# Patient Record
Sex: FEMALE | Race: WHITE | NOT HISPANIC OR LATINO | Employment: FULL TIME | URBAN - METROPOLITAN AREA
[De-identification: names, ages, dates, MRNs, and addresses within clinical notes are randomized per-mention and may not be internally consistent; named-entity substitution may affect disease eponyms.]

---

## 2017-01-20 ENCOUNTER — OFFICE VISIT (OUTPATIENT)
Dept: FAMILY MEDICINE | Facility: CLINIC | Age: 37
End: 2017-01-20
Payer: COMMERCIAL

## 2017-01-20 VITALS
HEIGHT: 64 IN | OXYGEN SATURATION: 97 % | HEART RATE: 109 BPM | RESPIRATION RATE: 16 BRPM | SYSTOLIC BLOOD PRESSURE: 100 MMHG | TEMPERATURE: 98.4 F | WEIGHT: 135 LBS | BODY MASS INDEX: 23.05 KG/M2 | DIASTOLIC BLOOD PRESSURE: 64 MMHG

## 2017-01-20 DIAGNOSIS — J01.90 ACUTE SINUSITIS WITH COEXISTING CONDITION, NEED PROPHYLACTIC TREATMENT: Primary | ICD-10-CM

## 2017-01-20 PROCEDURE — 99213 OFFICE O/P EST LOW 20 MIN: CPT | Performed by: INTERNAL MEDICINE

## 2017-01-20 RX ORDER — AZITHROMYCIN 250 MG/1
TABLET, FILM COATED ORAL
Qty: 6 TABLET | Refills: 0 | Status: SHIPPED | OUTPATIENT
Start: 2017-01-20 | End: 2017-01-25

## 2017-01-20 NOTE — MR AVS SNAPSHOT
After Visit Summary   1/20/2017    Allyn Mcqueen    MRN: [id number]           Patient Information     Date Of Birth          1980        Visit Information        Provider Department      1/20/2017 4:45 PM Trish Alvarado MD [facility]        Today's Diagnoses     Acute sinusitis with coexisting condition, need prophylactic treatment    -  1       Care Instructions    No antihistamine  Nasal saline  Menthol  Nasal steroid  Humidifier    If not improving over next days, fill A prescription for zpak    Call or return to clinic if symptoms worsen or fail to improve as anticipated.          Follow-ups after your visit        Who to contact     If you have questions or need follow up information about today's clinic visit or your schedule please contact [facility] directly at [phone].  Normal or non-critical lab and imaging results will be communicated to you by MyChart, letter or phone within 4 business days after the clinic has received the results. If you do not hear from us within 7 days, please contact the clinic through MyChart or phone. If you have a critical or abnormal lab result, we will notify you by phone as soon as possible.  Submit refill requests through [employer] or call your pharmacy and they will forward the refill request to us. Please allow 3 business days for your refill to be completed.          Additional Information About Your Visit        MyChart Information     [employer] gives you secure access to your electronic health record. If you see a primary care provider, you can also send messages to your care team and make appointments. If you have questions, please call your primary care clinic.  If you do not have a primary care provider, please call [phone] and they will assist you.        Care EveryWhere ID     This is your Care EveryWhere ID. This could be used by other organizations to access your [facility]  "records  [id number]        Your Vitals Were     Pulse Temperature Respirations Height BMI (Body Mass Index) Pulse Oximetry    109 98.4  F (36.9  C) (Oral) 16 5' 4\" (1.626 m) 23.16 kg/m2 97%       Blood Pressure from Last 3 Encounters:   01/20/17 100/64   10/11/16 108/72   03/09/16 100/62    Weight from Last 3 Encounters:   01/20/17 135 lb (61.236 kg)   10/11/16 140 lb (63.504 kg)   03/09/16 148 lb (67.132 kg)              Today, you had the following     No orders found for display         Today's Medication Changes          These changes are accurate as of: 1/20/17  5:13 PM.  If you have any questions, ask your nurse or doctor.               Start taking these medicines.        Dose/Directions    azithromycin 250 MG tablet   Commonly known as:  ZITHROMAX   Used for:  Acute sinusitis with coexisting condition, need prophylactic treatment   Started by:  Trish Alvarado MD        Two tablets first day, then one tablet daily for four days.   Quantity:  6 tablet   Refills:  0            Where to get your medicines      Some of these will need a paper prescription and others can be bought over the counter.  Ask your nurse if you have questions.     Bring a paper prescription for each of these medications    - azithromycin 250 MG tablet             Primary Care Provider Office Phone # Fax #    Noelle Lundy [phone]       [facility] [address]  [facility] [address]        Thank you!     Thank you for choosing [facility]  for your care. Our goal is always to provide you with excellent care. Hearing back from our patients is one way we can continue to improve our services. Please take a few minutes to complete the written survey that you may receive in the mail after your visit with us. Thank you!             Your Updated Medication List - Protect others around you: Learn how to safely use, store and throw away your medicines at " www.disposemymeds.org.          This list is accurate as of: 1/20/17  5:13 PM.  Always use your most recent med list.                   Brand Name Dispense Instructions for use    azithromycin 250 MG tablet    ZITHROMAX    6 tablet    Two tablets first day, then one tablet daily for four days.       docusate sodium 100 MG tablet    COLACE    60 tablet    Take 100 mg by mouth 2 times daily as needed for constipation       fluconazole 150 MG tablet    DIFLUCAN    4 tablet    Take 1 tablet (150 mg) by mouth every 3 days       fluticasone 50 MCG/ACT spray    FLONASE     Spray 2 sprays into both nostrils daily       ibuprofen 600 MG tablet    ADVIL/MOTRIN    30 tablet    Take 1 tablet (600 mg) by mouth every 6 hours as needed for moderate pain       metroNIDAZOLE 500 MG tablet    FLAGYL    14 tablet    Take 1 tablet (500 mg) by mouth 2 times daily       prenatal multivitamin  plus iron 27-0.8 MG Tabs per tablet      Take 1 tablet by mouth daily       SUDAFED PO

## 2017-01-20 NOTE — NURSING NOTE
"Chief Complaint   Patient presents with     Sinus Problem     sinus pressure, congestion coughing, chills last night.        Initial /64 mmHg  Pulse 109  Temp(Src) 98.4  F (36.9  C) (Oral)  Resp 16  Ht 5' 4\" (1.626 m)  Wt 135 lb (61.236 kg)  BMI 23.16 kg/m2  SpO2 97% Estimated body mass index is 23.16 kg/(m^2) as calculated from the following:    Height as of this encounter: 5' 4\" (1.626 m).    Weight as of this encounter: 135 lb (61.236 kg).  BP completed using cuff size: regular  "

## 2017-01-20 NOTE — PROGRESS NOTES
"SUBJECTIVE:   Allyn Mcqueen is a 36 year old female presenting with a chief complaint of   Chief Complaint   Patient presents with     Sinus Problem     sinus pressure, congestion coughing, chills last night.        Onset of symptoms was 8 day(s) ago.  Course of illness is worsening.    Severity moderate  Current and Associated symptoms: nasal congestion, rhinorrhea, facial pain/pressure, headache and wore with leaning forward  Treatment measures tried include Fluids and OTC meds.  Predisposing factors include None.    Past Medical History   Diagnosis Date     Ovarian cyst rupture 04/2010     Current Outpatient Prescriptions   Medication Sig Dispense Refill     metroNIDAZOLE (FLAGYL) 500 MG tablet Take 1 tablet (500 mg) by mouth 2 times daily 14 tablet 0     fluconazole (DIFLUCAN) 150 MG tablet Take 1 tablet (150 mg) by mouth every 3 days 4 tablet 1     fluticasone (FLONASE) 50 MCG/ACT nasal spray Spray 2 sprays into both nostrils daily       Pseudoephedrine HCl (SUDAFED PO)        ibuprofen (ADVIL,MOTRIN) 600 MG tablet Take 1 tablet (600 mg) by mouth every 6 hours as needed for moderate pain 30 tablet 1     docusate sodium (COLACE) 100 MG tablet Take 100 mg by mouth 2 times daily as needed for constipation 60 tablet 1     Prenatal Vit-Fe Fumarate-FA (PRENATAL MULTIVITAMIN  PLUS IRON) 27-0.8 MG TABS Take 1 tablet by mouth daily       Social History   Substance Use Topics     Smoking status: Never Smoker      Smokeless tobacco: Never Used     Alcohol Use: No       ROS:  CONSTITUTIONAL:no fever  RESP:NEGATIVE for SOB/dyspnea    OBJECTIVE  :/64 mmHg  Pulse 109  Temp(Src) 98.4  F (36.9  C) (Oral)  Resp 16  Ht 5' 4\" (1.626 m)  Wt 135 lb (61.236 kg)  BMI 23.16 kg/m2  SpO2 97%  GENERAL APPEARANCE: healthy, alert and no distress  HENT: ear canals and TM's normal.  Nose and mouth without ulcers, erythema or lesions  HENT: TM's normal bilaterally, oral mucous membranes moist, no erythema noted, frontal sinus " tenderness  and maxillary sinus tenderness   PND  NECK: supple, nontender, no lymphadenopathy  RESP: lungs clear to auscultation - no rales, rhonchi or wheezes  CV: regular rates and rhythm, normal S1 S2, no murmur noted    ASSESSMENT:  (J01.90) Acute sinusitis with coexisting condition, need prophylactic treatment  (primary encounter diagnosis)  Comment:   Plan: azithromycin (ZITHROMAX) 250 MG tablet             Patient Instructions   No antihistamine  Nasal saline  Menthol  Nasal steroid  Humidifier    If not improving over next days, fill A prescription for zpak    Call or return to clinic if symptoms worsen or fail to improve as anticipated.

## 2017-01-20 NOTE — PATIENT INSTRUCTIONS
No antihistamine  Nasal saline  Menthol  Nasal steroid  Humidifier    If not improving over next days, fill A prescription for zpak    Call or return to clinic if symptoms worsen or fail to improve as anticipated.

## 2017-01-22 ENCOUNTER — MYC MEDICAL ADVICE (OUTPATIENT)
Dept: FAMILY MEDICINE | Facility: CLINIC | Age: 37
End: 2017-01-22

## 2017-01-22 DIAGNOSIS — H10.9 CONJUNCTIVITIS, UNSPECIFIED CONJUNCTIVITIS TYPE, UNSPECIFIED LATERALITY: Primary | ICD-10-CM

## 2017-01-23 RX ORDER — POLYMYXIN B SULFATE AND TRIMETHOPRIM 1; 10000 MG/ML; [USP'U]/ML
1 SOLUTION OPHTHALMIC EVERY 4 HOURS
Qty: 1 BOTTLE | Refills: 0 | Status: SHIPPED | OUTPATIENT
Start: 2017-01-23 | End: 2017-01-30

## 2017-03-09 ENCOUNTER — OFFICE VISIT (OUTPATIENT)
Dept: FAMILY MEDICINE | Facility: CLINIC | Age: 37
End: 2017-03-09
Payer: COMMERCIAL

## 2017-03-09 VITALS
DIASTOLIC BLOOD PRESSURE: 69 MMHG | SYSTOLIC BLOOD PRESSURE: 106 MMHG | OXYGEN SATURATION: 100 % | BODY MASS INDEX: 24.24 KG/M2 | HEIGHT: 64 IN | TEMPERATURE: 97.6 F | WEIGHT: 142 LBS | HEART RATE: 60 BPM

## 2017-03-09 DIAGNOSIS — Z00.00 WELL WOMAN EXAM WITHOUT GYNECOLOGICAL EXAM: Primary | ICD-10-CM

## 2017-03-09 PROCEDURE — 99395 PREV VISIT EST AGE 18-39: CPT | Performed by: FAMILY MEDICINE

## 2017-03-09 ASSESSMENT — ANXIETY QUESTIONNAIRES
3. WORRYING TOO MUCH ABOUT DIFFERENT THINGS: SEVERAL DAYS
1. FEELING NERVOUS, ANXIOUS, OR ON EDGE: SEVERAL DAYS
2. NOT BEING ABLE TO STOP OR CONTROL WORRYING: NOT AT ALL
5. BEING SO RESTLESS THAT IT IS HARD TO SIT STILL: NOT AT ALL
IF YOU CHECKED OFF ANY PROBLEMS ON THIS QUESTIONNAIRE, HOW DIFFICULT HAVE THESE PROBLEMS MADE IT FOR YOU TO DO YOUR WORK, TAKE CARE OF THINGS AT HOME, OR GET ALONG WITH OTHER PEOPLE: SOMEWHAT DIFFICULT
7. FEELING AFRAID AS IF SOMETHING AWFUL MIGHT HAPPEN: SEVERAL DAYS
6. BECOMING EASILY ANNOYED OR IRRITABLE: SEVERAL DAYS
GAD7 TOTAL SCORE: 5

## 2017-03-09 ASSESSMENT — PATIENT HEALTH QUESTIONNAIRE - PHQ9: 5. POOR APPETITE OR OVEREATING: SEVERAL DAYS

## 2017-03-09 NOTE — MR AVS SNAPSHOT
After Visit Summary   3/9/2017    Allyn Mcqueen    MRN: [id number]           Patient Information     Date Of Birth          1980        Visit Information        Provider Department      3/9/2017 9:20 AM Noelle Lundy MD [facility]        Care Instructions    Start taking folic acid and omega-3 fatty acids several months before trying to get pregnant.      Preventive Health Recommendations  Female Ages 26 - 39  Yearly exam:   See your health care provider every year in order to    Review health changes.     Discuss preventive care.      Review your medicines if you your doctor has prescribed any.    Until age 30: Get a Pap test every three years (more often if you have had an abnormal result).    After age 30: Talk to your doctor about whether you should have a Pap test every 3 years or have a Pap test with HPV screening every 5 years.   You do not need a Pap test if your uterus was removed (hysterectomy) and you have not had cancer.  You should be tested each year for STDs (sexually transmitted diseases), if you're at risk.   Talk to your provider about how often to have your cholesterol checked.  If you are at risk for diabetes, you should have a diabetes test (fasting glucose).  Shots: Get a flu shot each year. Get a tetanus shot every 10 years.   Nutrition:     Eat at least 5 servings of fruits and vegetables each day.    Eat whole-grain bread, whole-wheat pasta and brown rice instead of white grains and rice.    Talk to your provider about Calcium and Vitamin D.     Lifestyle    Exercise at least 150 minutes a week (30 minutes a day, 5 days of the week). This will help you control your weight and prevent disease.    Limit alcohol to one drink per day.    No smoking.     Wear sunscreen to prevent skin cancer.    See your dentist every six months for an exam and cleaning.          Follow-ups after your visit        Your next 10 appointments already scheduled     Mar  "17, 2017 11:00 AM CDT   Office Visit with Mireille Figueroa MD   [facility] ([facility]    [address] [place] [id number] [phone]           Bring a current list of meds and any records pertaining to this visit.  For Physicals, please bring immunization records and any forms needing to be filled out.  Please arrive 10 minutes early to complete paperwork.              Who to contact     If you have questions or need follow up information about today's clinic visit or your schedule please contact [facility] directly at [phone].  Normal or non-critical lab and imaging results will be communicated to you by MyChart, letter or phone within 4 business days after the clinic has received the results. If you do not hear from us within 7 days, please contact the clinic through Anywhere to Got or phone. If you have a critical or abnormal lab result, we will notify you by phone as soon as possible.  Submit refill requests through [employer] or call your pharmacy and they will forward the refill request to us. Please allow 3 business days for your refill to be completed.          Additional Information About Your Visit        Cuedd Information     [employer] gives you secure access to your electronic health record. If you see a primary care provider, you can also send messages to your care team and make appointments. If you have questions, please call your primary care clinic.  If you do not have a primary care provider, please call [phone] and they will assist you.        Care EveryWhere ID     This is your Care EveryWhere ID. This could be used by other organizations to access your [facility] medical records  [id number]        Your Vitals Were     Pulse Temperature Height Pulse Oximetry BMI (Body Mass Index)       60 97.6  F (36.4  C) (Oral) 5' 4\" (1.626 m) 100% 24.37 kg/m2        Blood Pressure from Last 3 Encounters:   03/09/17 106/69   01/20/17 " 100/64   10/11/16 108/72    Weight from Last 3 Encounters:   03/09/17 142 lb (64.4 kg)   01/20/17 135 lb (61.2 kg)   10/11/16 140 lb (63.5 kg)              Today, you had the following     No orders found for display         Today's Medication Changes          These changes are accurate as of: 3/9/17 10:04 AM.  If you have any questions, ask your nurse or doctor.               These medicines have changed or have updated prescriptions.        Dose/Directions    IBUPROFEN PO   This may have changed:  Another medication with the same name was removed. Continue taking this medication, and follow the directions you see here.   Changed by:  Noelle Lundy MD        Dose:  200 mg   Take 200 mg by mouth   Refills:  0                Primary Care Provider Office Phone # Fax #    Noelle Lundy [phone]       [place] [address] [address] [facility] [address]        Thank you!     Thank you for choosing [facility]  for your care. Our goal is always to provide you with excellent care. Hearing back from our patients is one way we can continue to improve our services. Please take a few minutes to complete the written survey that you may receive in the mail after your visit with us. Thank you!             Your Updated Medication List - Protect others around you: Learn how to safely use, store and throw away your medicines at www.disposemymeds.org.          This list is accurate as of: 3/9/17 10:04 AM.  Always use your most recent med list.                   Brand Name Dispense Instructions for use    azithromycin 250 MG tablet    ZITHROMAX    6 tablet    Two tablets first day, then one tablet daily for four days.       docusate sodium 100 MG tablet    COLACE    60 tablet    Take 100 mg by mouth 2 times daily as needed for constipation       fluconazole 150 MG tablet    DIFLUCAN    4 tablet    Take 1 tablet (150 mg) by mouth every 3 days       fluticasone 50 MCG/ACT  spray    FLONASE     Spray 2 sprays into both nostrils daily       IBUPROFEN PO      Take 200 mg by mouth       metroNIDAZOLE 500 MG tablet    FLAGYL    14 tablet    Take 1 tablet (500 mg) by mouth 2 times daily       MULTIVITAMIN & MINERAL PO          prenatal multivitamin  plus iron 27-0.8 MG Tabs per tablet      Take 1 tablet by mouth daily Reported on 3/9/2017       SUDAFED PO      Reported on 3/9/2017

## 2017-03-09 NOTE — PROGRESS NOTES
"   SUBJECTIVE:     CC: Allyn Mcqueen is an 36 year old woman who presents for preventive health visit.     Since her last visit, she has not had any major concerns or changes.     Chest Pain  She does say that for the past month, she has experienced a few episodes of chest tightness and that radiates down both andrew. This has happened when waking up at night and she says is mostly stress related. 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 She has had anxiety attacks about 10 years ago, but was not medicated for them and doesn't remember if there recent episodes are the same type of pain. She did not take any medications at that time for anxiety, and is not interested in starting one right now as she thinks that she mostly needs lifestyle/stress modification.     Family Planning  She is planning on having another child in the near future and currently has an IUD. Her previous pregnancy had no complications and she will be scheduling an appointment to have the IUD out in the upcoming months after starting folic acid.     Healthy Habits:  Answers for HPI/ROS submitted by the patient on 3/6/2017   Annual Exam:  Getting at least 3 servings of Calcium per day:: Yes  Bi-annual eye exam:: Yes  Dental care twice a year:: Yes  Sleep apnea or symptoms of sleep apnea:: None  Diet:: Regular (no restrictions)  Frequency of exercise:: 2-3 days/week  Taking medications regularly:: Not Applicable  Medication side effects:: Not applicable  Additional concerns today:: YES  Q1: Little interest or pleasure in doing things: 0=Not at all  Q2: Feeling down, depressed or hopeless: 1=Several days  PHQ-2 Score: 1  Duration of exercise:: 30-45 minutes    Anxiety ; chest pain here and there ; possibly a combo of muscle pain and anxiety     Today's PHQ-2 Score:   PHQ-2 ( 1999 [employer]) 3/6/2017 1/20/2017   Q1: Little interest or pleasure in doing things - 0   Q2: " Feeling down, depressed or hopeless - 0   PHQ-2 Score - 0   Little interest or pleasure in doing things Not at all -   Feeling down, depressed or hopeless Several days -   PHQ-2 Score 1 -      PHQ-2 entered via MyMusic   Abuse: Current or Past(Physical, Sexual or Emotional)- No  Do you feel safe in your environment - Yes    Social History   Substance Use Topics     Smoking status: Never Smoker     Smokeless tobacco: Never Used     Alcohol use No     The patient does not drink >3 drinks per day nor >7 drinks per week.    Recent Labs   Lab Test  04/19/10   0925   CHOL  188   HDL  59   LDL  110   TRIG  96   CHOLHDLRATIO  3.2     Reviewed orders with patient.  Reviewed health maintenance and updated orders accordingly - Yes    ROS:  C: NEGATIVE for fever, chills, change in weight  I: NEGATIVE for worrisome rashes, moles or lesions  E: NEGATIVE for vision changes or irritation  ENT: NEGATIVE for ear, mouth and throat problems  R: NEGATIVE for significant cough or SOB  B: NEGATIVE for masses, tenderness or discharge  CV: NEGATIVE for chest pain, palpitations or peripheral edema  GI: NEGATIVE for nausea, abdominal pain, heartburn, or change in bowel habits  : NEGATIVE for unusual urinary or vaginal symptoms. Periods are regular.  M: NEGATIVE for significant arthralgias or myalgia  N: NEGATIVE for weakness, dizziness or paresthesias  P: NEGATIVE for changes in mood or affect    BP Readings from Last 3 Encounters:   03/09/17 106/69   01/20/17 100/64   10/11/16 108/72    Wt Readings from Last 3 Encounters:   03/09/17 142 lb (64.4 kg)   01/20/17 135 lb (61.2 kg)   10/11/16 140 lb (63.5 kg)                  Current Outpatient Prescriptions   Medication Sig Dispense Refill     IBUPROFEN PO Take 200 mg by mouth       Multiple Vitamins-Minerals (MULTIVITAMIN & MINERAL PO)        fluticasone (FLONASE) 50 MCG/ACT nasal spray Spray 2 sprays into both nostrils daily       Pseudoephedrine HCl (SUDAFED PO) Reported on 3/9/2017        "Prenatal Vit-Fe Fumarate-FA (PRENATAL MULTIVITAMIN  PLUS IRON) 27-0.8 MG TABS Take 1 tablet by mouth daily Reported on 3/9/2017       Allergies   Allergen Reactions     Penicillins Rash     Rash on Augmentin     Recent Labs   Lab Test  05/30/13   1100  04/19/10   0925   LDL   --   110   HDL   --   59   TRIG   --   96   TSH  1.52   --       OBJECTIVE:     /69  Pulse 60  Temp 97.6  F (36.4  C) (Oral)  Ht 5' 4\" (1.626 m)  Wt 142 lb (64.4 kg)  SpO2 100%  BMI 24.37 kg/m2    EXAM:  Physical Exam   Constitutional: She is oriented to person, place, and time and well-developed, well-nourished, and in no distress. No distress.   HENT:   Nose: Nose normal.   Mouth/Throat: Oropharynx is clear and moist.   Eyes: Conjunctivae and EOM are normal. Pupils are equal, round, and reactive to light. No scleral icterus.   Neck: Normal range of motion. Neck supple.   Cardiovascular: Normal rate, regular rhythm and normal heart sounds.  Exam reveals no gallop and no friction rub.    No murmur heard.  Pulmonary/Chest: Effort normal and breath sounds normal. No respiratory distress. She has no wheezes. She has no rales.   Abdominal: Soft. Bowel sounds are normal. She exhibits no distension and no mass. There is no tenderness.   Musculoskeletal: Normal range of motion. She exhibits no deformity.   Neurological: She is alert and oriented to person, place, and time. No cranial nerve deficit. She exhibits normal muscle tone. Gait normal. GCS score is 15.   Skin: Skin is warm. No rash noted.   Psychiatric: Mood, memory, affect and judgment normal.   Physical Exam   Constitutional: She is oriented to person, place, and time and well-developed, well-nourished, and in no distress. No distress.   HENT:   Nose: Nose normal.   Mouth/Throat: Oropharynx is clear and moist.   Eyes: Conjunctivae and EOM are normal. Pupils are equal, round, and reactive to light. No scleral icterus.   Neck: Normal range of motion. Neck supple.   Cardiovascular: Normal " "rate, regular rhythm and normal heart sounds.  Exam reveals no gallop and no friction rub.    No murmur heard.  Pulmonary/Chest: Effort normal and breath sounds normal. No respiratory distress. She has no wheezes. She has no rales.   Abdominal: Soft. Bowel sounds are normal. She exhibits no distension and no mass. There is no tenderness.   Musculoskeletal: Normal range of motion. She exhibits no deformity.   Neurological: She is alert and oriented to person, place, and time. No cranial nerve deficit. She exhibits normal muscle tone. Gait normal. GCS score is 15.   Skin: Skin is warm. No rash noted.   Psychiatric: Mood, memory, affect and judgment normal.        ASSESSMENT/PLAN:     Well woman exam, routine.    1. Contraception  Currently has an IUD, will follow up here or with OB for removal when she is ready.   - No changes today    2. Chest Tightness  Possible intermittent PAC's  Stress reduction  Likely stress related, given the character and timing. She is not interested is starting an anxiolytic at this time and will work on stress reduction at home and work.   - Follow up if worse or changing in character      COUNSELING:   Reviewed preventive health counseling, as reflected in patient instructions         reports that she has never smoked. She has never used smokeless tobacco.  Estimated body mass index is 24.37 kg/(m^2) as calculated from the following:    Height as of this encounter: 5' 4\" (1.626 m).    Weight as of this encounter: 142 lb (64.4 kg).      Counseling Resources:  ATP IV Guidelines  Pooled Cohorts Equation Calculator  Breast Cancer Risk Calculator  FRAX Risk Assessment  ICSI Preventive Guidelines  Dietary Guidelines for Americans, 2010  USDA's MyPlate  ASA Prophylaxis  Lung CA Screening    Noelle Lundy MD  [facility]    "

## 2017-03-09 NOTE — NURSING NOTE
"Chief Complaint   Patient presents with     Physical     Initial Ht 5' 4\" (1.626 m)  Wt 142 lb (64.4 kg)  BMI 24.37 kg/m2 Estimated body mass index is 24.37 kg/(m^2) as calculated from the following:    Height as of this encounter: 5' 4\" (1.626 m).    Weight as of this encounter: 142 lb (64.4 kg)..  BP completed using cuff size: yokasta Ochoa MA   "

## 2017-03-09 NOTE — PATIENT INSTRUCTIONS
Start taking folic acid and omega-3 fatty acids several months before trying to get pregnant.      Preventive Health Recommendations  Female Ages 26 - 39  Yearly exam:   See your health care provider every year in order to    Review health changes.     Discuss preventive care.      Review your medicines if you your doctor has prescribed any.    Until age 30: Get a Pap test every three years (more often if you have had an abnormal result).    After age 30: Talk to your doctor about whether you should have a Pap test every 3 years or have a Pap test with HPV screening every 5 years.   You do not need a Pap test if your uterus was removed (hysterectomy) and you have not had cancer.  You should be tested each year for STDs (sexually transmitted diseases), if you're at risk.   Talk to your provider about how often to have your cholesterol checked.  If you are at risk for diabetes, you should have a diabetes test (fasting glucose).  Shots: Get a flu shot each year. Get a tetanus shot every 10 years.   Nutrition:     Eat at least 5 servings of fruits and vegetables each day.    Eat whole-grain bread, whole-wheat pasta and brown rice instead of white grains and rice.    Talk to your provider about Calcium and Vitamin D.     Lifestyle    Exercise at least 150 minutes a week (30 minutes a day, 5 days of the week). This will help you control your weight and prevent disease.    Limit alcohol to one drink per day.    No smoking.     Wear sunscreen to prevent skin cancer.    See your dentist every six months for an exam and cleaning.

## 2017-03-10 ASSESSMENT — ANXIETY QUESTIONNAIRES: GAD7 TOTAL SCORE: 5

## 2017-04-05 ENCOUNTER — PRENATAL OFFICE VISIT (OUTPATIENT)
Dept: OBGYN | Facility: CLINIC | Age: 37
End: 2017-04-05
Payer: COMMERCIAL

## 2017-04-05 VITALS
SYSTOLIC BLOOD PRESSURE: 118 MMHG | BODY MASS INDEX: 24.2 KG/M2 | DIASTOLIC BLOOD PRESSURE: 81 MMHG | HEART RATE: 67 BPM | OXYGEN SATURATION: 100 % | WEIGHT: 141 LBS

## 2017-04-05 DIAGNOSIS — Z30.432 ENCOUNTER FOR IUD REMOVAL: Primary | ICD-10-CM

## 2017-04-05 LAB — BETA HCG QUAL IFA URINE: NEGATIVE

## 2017-04-05 PROCEDURE — 84703 CHORIONIC GONADOTROPIN ASSAY: CPT | Performed by: OBSTETRICS & GYNECOLOGY

## 2017-04-05 PROCEDURE — 58301 REMOVE INTRAUTERINE DEVICE: CPT | Performed by: OBSTETRICS & GYNECOLOGY

## 2017-04-05 NOTE — NURSING NOTE
"Chief Complaint   Patient presents with     IUD       Initial /81  Pulse 67  Wt 141 lb (64 kg)  SpO2 100%  Breastfeeding? No  BMI 24.2 kg/m2 Estimated body mass index is 24.2 kg/(m^2) as calculated from the following:    Height as of 3/9/17: 5' 4\" (1.626 m).    Weight as of this encounter: 141 lb (64 kg).  BP completed using cuff size: regular        The following HM Due: NONE      The following patient reported/Care Every where data was sent to:  P ABSTRACT QUALITY INITIATIVES [47510]  none     n/a             "

## 2017-04-05 NOTE — PROGRESS NOTES
Allyn Mcqueen is a 36 year old  who presents requesting removal of IUD.  She plans pregnancy.      OBJECTIVE:  Healthy female in NAD.  /81  Pulse 67  Wt 141 lb (64 kg)  SpO2 100%  Breastfeeding? No  BMI 24.2 kg/m2     Speculum is placed in the vagina, hibiclens prep used, and the IUD string is grasped with a ring forceps, and removed without difficulty.      She tolerated this well.     ASSESSMENT:  IUD removal.    PLAN: RTC as needed.

## 2017-04-05 NOTE — MR AVS SNAPSHOT
After Visit Summary   4/5/2017    Allyn Mcqueen    MRN: [id number]           Patient Information     Date Of Birth          1980        Visit Information        Provider Department      4/5/2017 3:00 PM Sally Henderson MD [facility]        Today's Diagnoses     Encounter for IUD removal    -  1       Follow-ups after your visit        Who to contact     If you have questions or need follow up information about today's clinic visit or your schedule please contact [facility] directly at [phone].  Normal or non-critical lab and imaging results will be communicated to you by AOI Medicalhart, letter or phone within 4 business days after the clinic has received the results. If you do not hear from us within 7 days, please contact the clinic through Retargetlyt or phone. If you have a critical or abnormal lab result, we will notify you by phone as soon as possible.  Submit refill requests through Qu[employer] Roller or call your pharmacy and they will forward the refill request to us. Please allow 3 business days for your refill to be completed.          Additional Information About Your Visit        MyChart Information     [employer]ler gives you secure access to your electronic health record. If you see a primary care provider, you can also send messages to your care team and make appointments. If you have questions, please call your primary care clinic.  If you do not have a primary care provider, please call [phone] and they will assist you.        Care EveryWhere ID     This is your Care EveryWhere ID. This could be used by other organizations to access your [place] medical records  [id number]        Your Vitals Were     Pulse Pulse Oximetry Breastfeeding? BMI (Body Mass Index)          67 100% No 24.2 kg/m2         Blood Pressure from Last 3 Encounters:   04/05/17 118/81   03/09/17 106/69   01/20/17 100/64    Weight from Last 3 Encounters:   04/05/17 141 lb (64 kg)   03/09/17 142 lb  (64.4 kg)   01/20/17 135 lb (61.2 kg)              We Performed the Following     Beta HCG qual IFA urine     REMOVE INTRAUTERINE DEVICE        Primary Care Provider Office Phone # Fax #    Noelle Lundy [phone]       [facility] [address]  [facility] [address]        Thank you!     Thank you for choosing [facility]  for your care. Our goal is always to provide you with excellent care. Hearing back from our patients is one way we can continue to improve our services. Please take a few minutes to complete the written survey that you may receive in the mail after your visit with us. Thank you!             Your Updated Medication List - Protect others around you: Learn how to safely use, store and throw away your medicines at www.disposemymeds.org.          This list is accurate as of: 4/5/17  3:29 PM.  Always use your most recent med list.                   Brand Name Dispense Instructions for use    fluticasone 50 MCG/ACT spray    FLONASE     Spray 2 sprays into both nostrils daily       IBUPROFEN PO      Take 200 mg by mouth       MULTIVITAMIN & MINERAL PO          prenatal multivitamin  plus iron 27-0.8 MG Tabs per tablet      Take 1 tablet by mouth daily Reported on 3/9/2017       SUDAFED PO      Reported on 3/9/2017

## 2017-04-17 ENCOUNTER — OFFICE VISIT (OUTPATIENT)
Dept: FAMILY MEDICINE | Facility: CLINIC | Age: 37
End: 2017-04-17
Payer: COMMERCIAL

## 2017-04-17 DIAGNOSIS — K13.0 ANGULAR CHEILITIS: Primary | ICD-10-CM

## 2017-04-17 PROCEDURE — 99213 OFFICE O/P EST LOW 20 MIN: CPT | Performed by: NURSE PRACTITIONER

## 2017-04-17 RX ORDER — CLOTRIMAZOLE 1 %
CREAM (GRAM) TOPICAL 2 TIMES DAILY
Qty: 15 G | Refills: 1 | COMMUNITY
Start: 2017-04-17 | End: 2017-07-26

## 2017-04-17 RX ORDER — MUPIROCIN 20 MG/G
OINTMENT TOPICAL 3 TIMES DAILY
Qty: 15 G | Refills: 1 | Status: SHIPPED | OUTPATIENT
Start: 2017-04-17 | End: 2017-05-01

## 2017-04-17 NOTE — MR AVS SNAPSHOT
After Visit Summary   4/17/2017    Allyn Mcqueen    MRN: [id number]           Patient Information     Date Of Birth          1980        Visit Information        Provider Department      4/17/2017 3:40 PM Noelle Pereira APRN CNP [facility]        Today's Diagnoses     Angular cheilitis    -  1       Follow-ups after your visit        Who to contact     If you have questions or need follow up information about today's clinic visit or your schedule please contact [facility] directly at [phone].  Normal or non-critical lab and imaging results will be communicated to you by [employer] Continuityhart, letter or phone within 4 business days after the clinic has received the results. If you do not hear from us within 7 days, please contact the clinic through [employer] Continuityhart or phone. If you have a critical or abnormal lab result, we will notify you by phone as soon as possible.  Submit refill requests through [email or web address] or call your pharmacy and they will forward the refill request to us. Please allow 3 business days for your refill to be completed.          Additional Information About Your Visit        MyChart Information     [email or web address] gives you secure access to your electronic health record. If you see a primary care provider, you can also send messages to your care team and make appointments. If you have questions, please call your primary care clinic.  If you do not have a primary care provider, please call [phone] and they will assist you.        Care EveryWhere ID     This is your Care EveryWhere ID. This could be used by other organizations to access your [place] medical records  [id number]        Your Vitals Were     Pulse Temperature Respirations Pulse Oximetry BMI (Body Mass Index)       64 98  F (36.7  C) 16 98% 24.2 kg/m2        Blood Pressure from Last 3 Encounters:   04/17/17 106/72   04/05/17 118/81   03/09/17 106/69    Weight from Last 3 Encounters:    04/17/17 141 lb (64 kg)   04/05/17 141 lb (64 kg)   03/09/17 142 lb (64.4 kg)              Today, you had the following     No orders found for display         Today's Medication Changes          These changes are accurate as of: 4/17/17 11:59 PM.  If you have any questions, ask your nurse or doctor.               Start taking these medicines.        Dose/Directions    clotrimazole 1 % cream   Commonly known as:  LOTRIMIN   Used for:  Angular cheilitis   Started by:  Noelle Pereira APRN CNP        Apply topically 2 times daily   Quantity:  15 g   Refills:  1       mupirocin 2 % ointment   Commonly known as:  BACTROBAN   Used for:  Angular cheilitis   Started by:  Noelle Pereira APRN CNP        Apply topically 3 times daily for 14 days   Quantity:  15 g   Refills:  1         Stop taking these medicines if you haven't already. Please contact your care team if you have questions.     SUDAFED PO   Stopped by:  Noelle Pereira APRN CNP                Where to get your medicines      These medications were sent to [id number] - [place], [place] - [address] AT [facility] [address]  [facility] [address], [place] [address]    Hours:  24-hours Phone:  [phone]     mupirocin 2 % ointment         Some of these will need a paper prescription and others can be bought over the counter.  Ask your nurse if you have questions.     You don't need a prescription for these medications     clotrimazole 1 % cream                Primary Care Provider Office Phone # Fax #    Noelle Lundy [phone]       [facility] [address]  [facility] [address]        Thank you!     Thank you for choosing [facility]  for your care. Our goal is always to provide you with excellent care. Hearing back from our patients is one way we can continue to improve our services. Please take a few minutes to complete the written survey  that you may receive in the mail after your visit with us. Thank you!             Your Updated Medication List - Protect others around you: Learn how to safely use, store and throw away your medicines at www.disposemymeds.org.          This list is accurate as of: 4/17/17 11:59 PM.  Always use your most recent med list.                   Brand Name Dispense Instructions for use    clotrimazole 1 % cream    LOTRIMIN    15 g    Apply topically 2 times daily       fluticasone 50 MCG/ACT spray    FLONASE     Spray 2 sprays into both nostrils daily Reported on 4/17/2017       IBUPROFEN PO      Take 200 mg by mouth       MULTIVITAMIN & MINERAL PO          mupirocin 2 % ointment    BACTROBAN    15 g    Apply topically 3 times daily for 14 days       prenatal multivitamin  plus iron 27-0.8 MG Tabs per tablet      Take 1 tablet by mouth daily Reported on 3/9/2017

## 2017-04-17 NOTE — PROGRESS NOTES
SUBJECTIVE:                                                    Allyn Mcqueen is a 36 year old female who presents to clinic today for the following health issues:  Chief Complaint   Patient presents with     Derm Problem       Allyn is in clinic today with c/o the corners of her mouth being dry, crusty, cracked.  No other rashes or skin issues.  She has been using an over-the-counter antifungal for the last couple of days it does seem to be helping, but is not resolving the issue.  She has tried an over-the-counter steroid cream, and this was not helpful.    No fever or chills.  No head cold symptoms.  She does have a history of getting cracked skin in the corners were mouth every winter, but this is the worst it has ever been.      Past Medical History:   Diagnosis Date     Ovarian cyst rupture 04/2010     Current Outpatient Prescriptions   Medication Sig Dispense Refill     mupirocin (BACTROBAN) 2 % ointment Apply topically 3 times daily for 14 days 15 g 1     clotrimazole (LOTRIMIN) 1 % cream Apply topically 2 times daily 15 g 1     IBUPROFEN PO Take 200 mg by mouth       Multiple Vitamins-Minerals (MULTIVITAMIN & MINERAL PO)        Prenatal Vit-Fe Fumarate-FA (PRENATAL MULTIVITAMIN  PLUS IRON) 27-0.8 MG TABS Take 1 tablet by mouth daily Reported on 3/9/2017       fluticasone (FLONASE) 50 MCG/ACT nasal spray Spray 2 sprays into both nostrils daily Reported on 4/17/2017       Social History   Substance Use Topics     Smoking status: Never Smoker     Smokeless tobacco: Never Used     Alcohol use No       ROS:  7 point Review of systems negative except as stated above.    OBJECTIVE  :/72  Pulse 64  Temp 98  F (36.7  C)  Resp 16  Wt 141 lb (64 kg)  SpO2 98%  BMI 24.2 kg/m2  EXAM:  Constitutional: healthy, alert, active and no distress  Neck: Neck supple. No adenopathy.  ENT: Bilateral TM's are normal.  Posterior oropharynx is clear.  Nares clear without congestion.  Mouth: Both of the corners of her  mouth are irritated and excoriated, cracking noted, as well as some honey crusting/localized irritation/erythema that may indicate an infection.  Skin: warm and dry  Psychiatric: mentation appears normal. and affect normal/bright      ASSESSMENT:  (K13.0) Angular cheilitis  (primary encounter diagnosis)  Comment: Acute  Plan: mupirocin (BACTROBAN) 2 % ointment,         clotrimazole (LOTRIMIN) 1 % cream           She is to continue the b.i.d. antifungal/over-the-counter treatment.  I did discuss with her that I feel she also has a bacterial infection with the additional honey crusting and local irritation that is present.  She will use a small amount of the mupirocin as prescribed.  I discussed signs and symptoms of worsening, if no improvement she is to be reevaluated and rechecked.  She agrees and understands and will try the topicals as discussed/prescribed.

## 2017-04-18 VITALS
TEMPERATURE: 98 F | OXYGEN SATURATION: 98 % | HEART RATE: 64 BPM | RESPIRATION RATE: 16 BRPM | SYSTOLIC BLOOD PRESSURE: 106 MMHG | DIASTOLIC BLOOD PRESSURE: 72 MMHG | WEIGHT: 141 LBS | BODY MASS INDEX: 24.2 KG/M2

## 2017-07-07 ENCOUNTER — PRENATAL OFFICE VISIT (OUTPATIENT)
Dept: NURSING | Facility: CLINIC | Age: 37
End: 2017-07-07
Payer: COMMERCIAL

## 2017-07-07 VITALS
BODY MASS INDEX: 23.85 KG/M2 | SYSTOLIC BLOOD PRESSURE: 100 MMHG | WEIGHT: 139.7 LBS | TEMPERATURE: 98.4 F | DIASTOLIC BLOOD PRESSURE: 72 MMHG | HEART RATE: 84 BPM | HEIGHT: 64 IN

## 2017-07-07 DIAGNOSIS — Z34.90 SUPERVISION OF NORMAL PREGNANCY: Primary | ICD-10-CM

## 2017-07-07 PROBLEM — Z23 NEED FOR TDAP VACCINATION: Status: ACTIVE | Noted: 2017-07-07

## 2017-07-07 LAB
ABO + RH BLD: NORMAL
ABO + RH BLD: NORMAL
ALBUMIN UR-MCNC: NEGATIVE MG/DL
APPEARANCE UR: CLEAR
BILIRUB UR QL STRIP: NEGATIVE
BLD GP AB SCN SERPL QL: NORMAL
BLOOD BANK CMNT PATIENT-IMP: NORMAL
COLOR UR AUTO: YELLOW
ERYTHROCYTE [DISTWIDTH] IN BLOOD BY AUTOMATED COUNT: 12.4 % (ref 10–15)
GLUCOSE UR STRIP-MCNC: NEGATIVE MG/DL
HCT VFR BLD AUTO: 35.2 % (ref 35–47)
HGB BLD-MCNC: 12.3 G/DL (ref 11.7–15.7)
HGB UR QL STRIP: NEGATIVE
KETONES UR STRIP-MCNC: NEGATIVE MG/DL
LEUKOCYTE ESTERASE UR QL STRIP: ABNORMAL
MCH RBC QN AUTO: 30.8 PG (ref 26.5–33)
MCHC RBC AUTO-ENTMCNC: 34.9 G/DL (ref 31.5–36.5)
MCV RBC AUTO: 88 FL (ref 78–100)
NITRATE UR QL: NEGATIVE
PH UR STRIP: 7 PH (ref 5–7)
PLATELET # BLD AUTO: 208 10E9/L (ref 150–450)
RBC # BLD AUTO: 4 10E12/L (ref 3.8–5.2)
SP GR UR STRIP: <=1.005 (ref 1–1.03)
SPECIMEN EXP DATE BLD: NORMAL
URN SPEC COLLECT METH UR: ABNORMAL
UROBILINOGEN UR STRIP-ACNC: 0.2 EU/DL (ref 0.2–1)
WBC # BLD AUTO: 6.3 10E9/L (ref 4–11)

## 2017-07-07 PROCEDURE — 86900 BLOOD TYPING SEROLOGIC ABO: CPT | Performed by: OBSTETRICS & GYNECOLOGY

## 2017-07-07 PROCEDURE — 99207 ZZC NO CHARGE NURSE ONLY: CPT

## 2017-07-07 PROCEDURE — 86762 RUBELLA ANTIBODY: CPT | Performed by: OBSTETRICS & GYNECOLOGY

## 2017-07-07 PROCEDURE — 36415 COLL VENOUS BLD VENIPUNCTURE: CPT | Performed by: OBSTETRICS & GYNECOLOGY

## 2017-07-07 PROCEDURE — 81003 URINALYSIS AUTO W/O SCOPE: CPT | Performed by: OBSTETRICS & GYNECOLOGY

## 2017-07-07 PROCEDURE — 86850 RBC ANTIBODY SCREEN: CPT | Performed by: OBSTETRICS & GYNECOLOGY

## 2017-07-07 PROCEDURE — 86901 BLOOD TYPING SEROLOGIC RH(D): CPT | Performed by: OBSTETRICS & GYNECOLOGY

## 2017-07-07 PROCEDURE — 87086 URINE CULTURE/COLONY COUNT: CPT | Performed by: OBSTETRICS & GYNECOLOGY

## 2017-07-07 PROCEDURE — 86780 TREPONEMA PALLIDUM: CPT | Performed by: OBSTETRICS & GYNECOLOGY

## 2017-07-07 PROCEDURE — 87389 HIV-1 AG W/HIV-1&-2 AB AG IA: CPT | Performed by: OBSTETRICS & GYNECOLOGY

## 2017-07-07 PROCEDURE — 85027 COMPLETE CBC AUTOMATED: CPT | Performed by: OBSTETRICS & GYNECOLOGY

## 2017-07-07 PROCEDURE — 87340 HEPATITIS B SURFACE AG IA: CPT | Performed by: OBSTETRICS & GYNECOLOGY

## 2017-07-07 NOTE — PROGRESS NOTES
Patient presents for new ob teaching and labs, second pregnancy. IUD removed 4/05/17. Ultrasound scheduled for 7/25/17, has appointment with Dr Figueroa 7/26/17(DARRNE post call unable to correlate ultrasound same day) . Ordered first trimester screening, handouts reviewed and given. Will try B6 and Unisom for nausea.    Caffeine intake/servings daily - 0  Calcium intake/servings daily - 3  Exercise 3 times weekly - describe yoga,, bikes  Sunscreen used - Yes  Seatbelts used - Yes  Guns stored in the home - Yes  Self Breast Exam - Yes  Pap test up to date -  Yes  Eye exam up to date -  Yes  Dental exam up to date -  Yes  DEXA scan up to date -  No  Flex Sig/Colonoscopy up to date -  No  Mammography up to date -  No  Immunizations reviewed and up to date - Yes  Abuse: Current or Past (Physical, Sexual or Emotional) - No  Do you feel safe in your environment - Yes  Do you cope well with stress - Yes  Do you suffer from insomnia - No    Prenatal OB Questionnaire  Past Medical History  Diabetes   No  Hypertension   No  Heart Disease, mitral valve prolapse, or rheumatic fever?   No  An autoimmune disorder such as Lupus or Rheumatoid Arthritis?   No  Kidney Disease or Urinary Tract Infection?   No  Epilepsy, seizures or spells?   No  Migraine headaches?   No  A stroke or loss of function or sensation?   No  Any other neurological problems?   No  Have you ever been treated for depression?  No  Are you having problems with crying spells or loss of self-esteem?   No  Have you ever required psychiatric care?   No  Have you ever hepatitis, liver disease or jaundice?   No  Have you ever been treated for blood clots in your veins, deep venous thrombosis, inflammation in the veins, thrombosis, phlebitis, pulmonary embolism or varicosities?   No  Have you had excessive bleeding after surgery or dental work?   No  Do you bleed more than other women after a cut or scratch?   No  Do you have a history of anemia?   Yes  Have you ever been  treated for thyroid problems or taken thyroid medication?  No  Do you have any other endocrine problems?  No  Have you ever been in a major accident or suffered serious trauma?   No  Within the last year, has anyone hit slapped, kicked or otherwise hurt you?  No  In the last year, has anyone forced you to have sex when you didn't want to?  No  Have you ever had a blood transfusion?   No  Would you refuse a blood transfusion if a doctor judged it to be medically necessary?   No  If you answered yes, would you rather die than have a blood transfusion?   No  If you answered yes, is this for Anabaptist reasons?   No  Does anyone in your home smoke?   No  Do you use tobacco products?  No  Do you drink beer, wine, hard liquor?  No  Do you use any of the following: marijuana, speed, cocaine, heroine, hallucinogens, or other drugs?  No  Is your blood type Rh negative?   No  Have you ever had abnormal antibodies in your blood?   No  Have you ever had asthma?   No  Have you ever had tuberculosis?   No  Do you have any allergies to drugs or over-the-counter medications?   Yes    Allergies as of 7/7/2017:    Allergies as of 07/07/2017 - Eduard as Reviewed 07/07/2017   Allergen Reaction Noted     Adhesive tape Rash 04/06/2010     Penicillins Rash 04/16/2010       Do you have any breast problems?   No  Have you ever ?   Yes  Have you had any gynecological surgical procedures such as cervical conization, a LEEP procedure, laser treatment, cryosurgery of the cervix, or a dilation and curettage, etc?  No  Have you had any other surgical procedures?  Yes  Have you been hospitalized for a nonsurgical reason excluding normal delivery?   No  Have you ever had any anesthetic complications?   No  Have you ever had an abnormal pap smear?   Yes  Do you have a history of abnormalities of the uterus?   No  Did it take you more than one year to become pregnant?   No  Have you ever been evaluated or treated for infertility?   No  Is there  a history of medical problems in your family, which you feel might adversely affect your health or pregnancy?   No  Do you have any other problems we have not asked you about which you feel may be important to this pregnancy?  No    Symptoms since Last Menstrual Period  Do you have any of the following:    *abdominal pain  Yes  *blood in stool or urine  No  *chest pain  No  *shortness of breath  Yes  *coughing or vomiting up blood No  *heart racing or skipping beats  No  *nausea and vomiting  Yes  *pain with urination  No  *vaginal discharge or bleeding  No  Current medications are:  Current Outpatient Prescriptions   Medication Sig Dispense Refill     clotrimazole (LOTRIMIN) 1 % cream Apply topically 2 times daily 15 g 1     Prenatal Vit-Fe Fumarate-FA (PRENATAL MULTIVITAMIN  PLUS IRON) 27-0.8 MG TABS Take 1 tablet by mouth daily Reported on 3/9/2017         Genetic Screening  At the time of birth, will you be 35 years old or older?  Yes  Has the patient, baby s father, or anyone in either family had:  Thalassemia (Italian, Greek, Mediterranean, or  background only) and an MCV result less than 80?  No  Neural tube defect such as meningomyelocele, spina bifida or anencephaly?  No  Congenital heart defect?  No  Down s syndrome?  No  Everett-Sach s disease (Mormon, Cajun, Armenian-[place])?  No  Sickle cell disease or trait ([place])?  No  Hemophilia or other inherited problems of blood coagulation? No  Muscular dystrophy?  No  Cystic Fibrosis?  No  Ameena s chorea?  No  Mental retardation/autism? No   If yes, was the person tested for fragile X?  No  Any other inherited genetic or chromosomal disorder?  No  Maternal metabolic disorder (e.g. insulin-dependent diabetes, PKU)? No  A child with birth defects not listed above?  No  Recurrent pregnancy loss or a stillbirth?  No  Has the patient had any medications/street drugs/alcohol since her last menstrual period? No  Does the patient or baby s father have any other  genetic risks?  No  Infection History  Do you object to being tested for Hepatitis B? No  Do you object to being tested for HIV? No  Do you feel that you are at high risk for coming in contact with the AIDS virus?  No  Have you ever been treated for tuberculosis?  No  Have you ever received the BCG vaccine for tuberculosis?  No  Have you ever had a positive skin test for tuberculosis? No  Do you live with someone who has tuberculosis?  No  Have you ever been exposed to tuberculosis?  No  Do you have genital herpes?  No  Does your partner have genital herpes?  No  Have you had a rash or viral illness since your last period?  No  Have you ever had Gonorrhea, Chlamydia, Syphilis, venereal warts, trichomoniasis, pelvic inflammatory disease or any other sexually transmitted disease?  No  Do you know if you are a genital group B streptococcus carrier? No  You have not had chicken pox/varicella  Yes  Have you been vaccinated against chicken pox?  Yes  Have you had any other infectious disease? No        Early ultrasound screening tool:    Does patient have irregular periods?  Yes  Did patient use hormonal birth control in the three months prior to positive urine pregnancy test? Yes IUD removed 4/2017  Is the patient breastfeeding?  No  Is the patient 10 weeks or greater at time of education visit?  No

## 2017-07-07 NOTE — MR AVS SNAPSHOT
After Visit Summary   7/7/2017    Allyn Mcqueen    MRN: [id number]           Patient Information     Date Of Birth          1980        Visit Information        Provider Department      7/7/2017 9:15 AM RD OB NURSE EDUCATION [facility]        Today's Diagnoses     Supervision of normal pregnancy    -  1       Follow-ups after your visit        Additional Services     MAT FETAL MED CTR REFERRAL-PREGNANCY       >> Patient may proceed with recommendations for further testing as directed by the Maternal Fetal Medicine Specialist >>    >> If requesting Fetal Echo: MFM will determine appropriate location for exam due to indication.    >> If requesting Lung Maturity Amnio:  If results indicate fetal lung maturity, induction or C/S is recommended within 36 hours.  Please schedule accordingly.     Dear Patient:   Please be aware that coverage of these services is subject to the terms and limitations of your health insurance plan.  Call member services at your health plan with any benefit or coverage questions.      Please bring the following to your appointment:    >>  Any x-rays, CTs or MRIs which have been performed.  Contact the facility where they were done to arrange for  prior to your scheduled appointment.  Any new CT, MRI or other procedures ordered by your specialist must be performed at a [facility] facility or coordinated by your clinic's referral office.  >>  List of current medications   >>  This referral request   >>  Any documents/labs given to you for this referral                  Your next 10 appointments already scheduled     Jul 25, 2017  8:40 AM CDT   US OB < 14 WEEKS SINGLE with RDUS1   [facility] ([facility])    [address] [place] [address]  [address] [place] [address] [phone]           Please bring a list of your medicines (including vitamins, minerals and over-the-counter drugs). Also, tell your doctor about any allergies  you may have. Wear comfortable clothes and leave your valuables at home.  If you re less than 20 weeks drink four 8-ounce glasses of fluid an hour before your exam. If you need to empty your bladder before your exam, try to release only a little urine. Then, drink another glass of fluid.  You may have up to two family members in the exam room. If you bring a small child, an adult must be there to care for him or her.  Please call the Imaging Department at your exam site with any questions.            Jul 26, 2017 10:00 AM CDT   New Prenatal with Mireille Figueroa [place]   [facility]    [address] [place] [address] [phone]              Future tests that were ordered for you today     Open Future Orders        Priority Expected Expires Ordered    US OB < 14 weeks, single,  for dating ([id number]) Routine  10/5/2017 7/7/2017            Who to contact     If you have questions or need follow up information about today's clinic visit or your schedule please contact [facility] directly at [phone].  Normal or non-critical lab and imaging results will be communicated to you by MyChart, letter or phone within 4 business days after the clinic has received the results. If you do not hear from us within 7 days, please contact the clinic through [employer] or phone. If you have a critical or abnormal lab result, we will notify you by phone as soon as possible.  Submit refill requests through [employer] or call your pharmacy and they will forward the refill request to us. Please allow 3 business days for your refill to be completed.          Additional Information About Your Visit        [employer] Information     [employer] gives you secure access to your electronic health record. If you see a primary care provider, you can also send messages to your care team and make appointments. If you have questions, please call your primary care clinic.  If  "you do not have a primary care provider, please call [phone] and they will assist you.        Care EveryWhere ID     This is your Care EveryWhere ID. This could be used by other organizations to access your [facility] medical records  [id number]        Your Vitals Were     Pulse Temperature Height Last Period BMI (Body Mass Index)       84 98.4  F (36.9  C) (Oral) 5' 4\" (1.626 m) 05/15/2017 23.98 kg/m2        Blood Pressure from Last 3 Encounters:   07/07/17 100/72   04/17/17 106/72   04/05/17 118/81    Weight from Last 3 Encounters:   07/07/17 139 lb 11.2 oz (63.4 kg)   04/17/17 141 lb (64 kg)   04/05/17 141 lb (64 kg)              We Performed the Following     ABO/RH Type and Screen     Anti Treponema     CBC with Platelets     Hepatitis B surface antigen     HIV Antigen Antibody Combo     MAT FETAL MED CTR REFERRAL-PREGNANCY     Rubella Antibody IgG Quantitative     UA without Microscopic     Urine Culture Aerobic Bacterial        Primary Care Provider Office Phone # Fax #    Noelle Snehal Lundy [phone]       [facility] [address]  [facility] [address]        Equal Access to Services     JALIL PERKINS AH: Hadii aad ku hadasho Soomaali, waaxda luqadaha, qaybta kaalmada adeegyada, waxay idiin haythelman hermelinda wilson. So [facility] [phone].    ATENCIÓN: Si habla español, tiene a ferrer disposición servicios gratuitos de asistencia lingüística. Llame al [phone].    We comply with applicable federal civil rights laws and [place] laws. We do not discriminate on the basis of race, color, national origin, age, disability sex, sexual orientation or gender identity.            Thank you!     Thank you for choosing [facility]  for your care. Our goal is always to provide you with excellent care. Hearing back from our patients is one way we can continue to improve our services. Please take a few minutes to complete the written survey that you may receive " in the mail after your visit with us. Thank you!             Your Updated Medication List - Protect others around you: Learn how to safely use, store and throw away your medicines at www.disposemymeds.org.          This list is accurate as of: 7/7/17 10:07 AM.  Always use your most recent med list.                   Brand Name Dispense Instructions for use Diagnosis    clotrimazole 1 % cream    LOTRIMIN    15 g    Apply topically 2 times daily    Angular cheilitis       prenatal multivitamin  plus iron 27-0.8 MG Tabs per tablet      Take 1 tablet by mouth daily Reported on 3/9/2017

## 2017-07-08 LAB
BACTERIA SPEC CULT: NO GROWTH
MICRO REPORT STATUS: NORMAL
SPECIMEN SOURCE: NORMAL
T PALLIDUM IGG+IGM SER QL: NEGATIVE

## 2017-07-10 LAB
HBV SURFACE AG SERPL QL IA: NONREACTIVE
HIV 1+2 AB+HIV1 P24 AG SERPL QL IA: NORMAL

## 2017-07-11 LAB — RUBV IGG SERPL IA-ACNC: 97 IU/ML

## 2017-07-25 ENCOUNTER — RADIANT APPOINTMENT (OUTPATIENT)
Dept: ULTRASOUND IMAGING | Facility: CLINIC | Age: 37
End: 2017-07-25
Attending: OBSTETRICS & GYNECOLOGY
Payer: COMMERCIAL

## 2017-07-25 DIAGNOSIS — Z34.90 SUPERVISION OF NORMAL PREGNANCY: ICD-10-CM

## 2017-07-25 PROCEDURE — 76815 OB US LIMITED FETUS(S): CPT | Performed by: OBSTETRICS & GYNECOLOGY

## 2017-07-26 ENCOUNTER — PRENATAL OFFICE VISIT (OUTPATIENT)
Dept: OBGYN | Facility: CLINIC | Age: 37
End: 2017-07-26
Payer: COMMERCIAL

## 2017-07-26 VITALS
WEIGHT: 139.4 LBS | BODY MASS INDEX: 23.8 KG/M2 | SYSTOLIC BLOOD PRESSURE: 102 MMHG | HEART RATE: 62 BPM | DIASTOLIC BLOOD PRESSURE: 68 MMHG | HEIGHT: 64 IN | TEMPERATURE: 97.1 F

## 2017-07-26 DIAGNOSIS — O31.20X0 CONTINUING PREGNANCY AFTER INTRAUTERINE DEATH OF ONE TWIN WITH INTRAUTERINE RETENTION: Primary | ICD-10-CM

## 2017-07-26 PROCEDURE — 99207 ZZC FIRST OB VISIT: CPT | Performed by: OBSTETRICS & GYNECOLOGY

## 2017-07-26 NOTE — PROGRESS NOTES
CC: New Ob visit  HPI: Allyn Mcqueen is a 36 year old  here for new Ob visit.  Patient's last menstrual period was 05/15/2017..  She is 10w2d by known LMP c/w 10wk us, giving her an EDC of 18.  The pregnancy was planned and she and her  are feeling excited.    This far the pregnancy has been uneventful other than mild nausea, mostly in the evenings.  They came for viability scan yesterday and was found to have di/di twins with the demise of one twin.  They were shocked and saddened, but also relieved not to be having twins.  They are just worried how it will effect the live twin.      Past Medical History:   Diagnosis Date     Ovarian cyst rupture 2010       Past Surgical History:   Procedure Laterality Date     C SPINAL FUSION,ANT,EA ADNL LEVEL      fused cervical vertebrae ?c3-4       @OBH@      Current Outpatient Prescriptions:      Prenatal Vit-Fe Fumarate-FA (PRENATAL MULTIVITAMIN  PLUS IRON) 27-0.8 MG TABS, Take 1 tablet by mouth daily Reported on 3/9/2017, Disp: , Rfl:   No current facility-administered medications for this visit.     Facility-Administered Medications Ordered in Other Visits:      lidocaine-EPINEPHrine 1.5 %-1:[id number] injection, , EPIDURAL, PRN, Beata Olvera MD, 3 mL at 07/14/15 1739     bupivacaine HCl 0.25 % preservative free injection SOLN, , EPIDURAL, PRN, Beata Olvera MD, 10 mL at 07/14/15 1744    Allergies   Allergen Reactions     Adhesive Tape Rash     Penicillins Rash     Rash on Augmentin       Family History   Problem Relation Age of Onset     Respiratory Mother      COPD     Psychotic Disorder Mother      Bi polar     MENTAL ILLNESS Mother      diagnosed bipolar; depression     Lipids Father      HEART DISEASE Maternal Grandmother      CEREBROVASCULAR DISEASE Maternal Grandmother      CANCER Paternal Aunt      breast 60s       Past medical, social, surgical and family history were reviewed and updated in EPIC.    ROS: No TIA's or unusual  "headaches, no dysphagia.  No prolonged cough. No dyspnea or chest pain on exertion.  No abdominal pain, change in bowel habits, black or bloody stools.  No urinary tract symptoms.  No new or unusual musculoskeletal symptoms.  Normal menses, no abnormal vaginal bleeding, discharge or unexpected pelvic pain. No new breast lumps, breast pain or nipple discharge.    PE: /68  Pulse 62  Temp 97.1  F (36.2  C) (Oral)  Ht 5' 4\" (1.626 m)  Wt 139 lb 6.4 oz (63.2 kg)  LMP 05/15/2017  BMI 23.93 kg/m2    Gen: Healthy appearing female in no acute distress  Heart: RRR  Lungs: CTAB  Abd: +BS, SNT  Ex: No C/C/E, no suspicious rashes or lesions    Pelvic: Normal vulva and vagina with scant white d/c.  Cervix without lesions, no CMT.  Uterus approximately 12 week size, mobile, NT.  Adnexa NT, no masses.    A/P:  1) IUP at 10w2d, twin preg with demise of 1 twin        PNL wnl, pap utd        Reviewed anticipated course of prenatal care        Reviewed recommendations for weight gain, activity and diet        We discussed the loss of a twin and I explained that with a di/di pregnancy, typically the live twin is fine.  Usually it will be an immediate event if it affects the living twin.  Growth was good.  We discussed unless something is discovered, this will likely be a \"normal nogueira\" pregnancy for her.  We did discuss genetic screening to make sure this twin is not abnormal as well and they are already scheduled.  Planning to \"do it all.\"       Discussed MD call schedule as well as role of residents and med students both in clinic and hospital.  She is ok with resident care       RTC 4 weeks    Mireille Figueroa MD                 "

## 2017-07-26 NOTE — NURSING NOTE
"Chief Complaint   Patient presents with     Prenatal Care       Initial /68  Pulse 62  Temp 97.1  F (36.2  C) (Oral)  Ht 5' 4\" (1.626 m)  Wt 139 lb 6.4 oz (63.2 kg)  LMP 05/15/2017  BMI 23.93 kg/m2 Estimated body mass index is 23.93 kg/(m^2) as calculated from the following:    Height as of this encounter: 5' 4\" (1.626 m).    Weight as of this encounter: 139 lb 6.4 oz (63.2 kg).  BP completed using cuff size: regular        The following HM Due: NONE      The following patient reported/Care Every where data was sent to:  P ABSTRACT QUALITY INITIATIVES [51039]       patient has appointment for today    Joselin Joy CMA               "

## 2017-07-26 NOTE — MR AVS SNAPSHOT
After Visit Summary   7/26/2017    Allyn Mcqueen    MRN: [id number]           Patient Information     Date Of Birth          1980        Visit Information        Provider Department      7/26/2017 10:00 AM Mireille Figueroa MD [facility]        Today's Diagnoses     Continuing pregnancy after intrauterine death of one twin with intrauterine retention    -  1       Follow-ups after your visit        Your next 10 appointments already scheduled     Aug 07, 2017  8:45 AM CDT   Genetic Counseling with UR GEN COUNSELOR 2   [facility] Maternal Fetal Medicine - [facility])    [address]  [facility] [address]   [phone]            Aug 07, 2017  9:30 AM CDT   MFM NUCHAL TRANSLUCENCY with [id number]   [facility] Maternal Fetal Medicine Ultrasound - [facility])    [address]  [facility] [address]   [phone]            Aug 07, 2017 10:00 AM CDT   Radiology MD with UR CARLOS GONZALEZ   [facility] Maternal Fetal Medicine - [facility])    [address]  [facility] [address]   [phone]           Please arrive at the time given for your first appointment. This visit is used internally to schedule the physician's time during your ultrasound.              Who to contact     If you have questions or need follow up information about today's clinic visit or your schedule please contact [facility] directly at [phone].  Normal or non-critical lab and imaging results will be communicated to you by MyChart, letter or phone within 4 business days after the clinic has received the results. If you do not hear from us within 7 days, please contact the clinic through MyChart or phone. If you have a critical or abnormal lab result, we will notify you by phone as soon as possible.  Submit refill requests through [employer] or call  "your pharmacy and they will forward the refill request to us. Please allow 3 business days for your refill to be completed.          Additional Information About Your Visit        MyChart Information     [employer] gives you secure access to your electronic health record. If you see a primary care provider, you can also send messages to your care team and make appointments. If you have questions, please call your primary care clinic.  If you do not have a primary care provider, please call [phone] and they will assist you.        Care EveryWhere ID     This is your Care EveryWhere ID. This could be used by other organizations to access your [facility] medical records  [id number]        Your Vitals Were     Pulse Temperature Height Last Period BMI (Body Mass Index)       62 97.1  F (36.2  C) (Oral) 5' 4\" (1.626 m) 05/15/2017 23.93 kg/m2        Blood Pressure from Last 3 Encounters:   07/26/17 102/68   07/07/17 100/72   04/17/17 106/72    Weight from Last 3 Encounters:   07/26/17 139 lb 6.4 oz (63.2 kg)   07/07/17 139 lb 11.2 oz (63.4 kg)   04/17/17 141 lb (64 kg)              Today, you had the following     No orders found for display         Today's Medication Changes          These changes are accurate as of: 7/26/17  1:41 PM.  If you have any questions, ask your nurse or doctor.               Stop taking these medicines if you haven't already. Please contact your care team if you have questions.     clotrimazole 1 % cream   Commonly known as:  LOTRIMIN   Stopped by:  Mireille Figueroa MD                    Primary Care Provider Office Phone # Fax #    Noelle Lundy [phone]       [facility] [address]  [facility] [address]        Equal Access to Services     THIERNO PERKINS AH: Terrence Mo, wataliada maria e, qaybta kaalmayuliya montelongo. So [facility] [phone].    ATENCIÓN: Si habla español, tiene a ferrer disposición " servicios gratuitos de asistencia lingüística. Spring valdez [phone].    We comply with applicable federal civil rights laws and [place] laws. We do not discriminate on the basis of race, color, national origin, age, disability sex, sexual orientation or gender identity.            Thank you!     Thank you for choosing [facility]  for your care. Our goal is always to provide you with excellent care. Hearing back from our patients is one way we can continue to improve our services. Please take a few minutes to complete the written survey that you may receive in the mail after your visit with us. Thank you!             Your Updated Medication List - Protect others around you: Learn how to safely use, store and throw away your medicines at www.disposemymeds.org.          This list is accurate as of: 7/26/17  1:41 PM.  Always use your most recent med list.                   Brand Name Dispense Instructions for use Diagnosis    prenatal multivitamin  plus iron 27-0.8 MG Tabs per tablet      Take 1 tablet by mouth daily Reported on 3/9/2017

## 2017-08-02 ENCOUNTER — PRE VISIT (OUTPATIENT)
Dept: MATERNAL FETAL MEDICINE | Facility: CLINIC | Age: 37
End: 2017-08-02

## 2017-08-07 ENCOUNTER — OFFICE VISIT (OUTPATIENT)
Dept: MATERNAL FETAL MEDICINE | Facility: CLINIC | Age: 37
End: 2017-08-07
Attending: OBSTETRICS & GYNECOLOGY
Payer: COMMERCIAL

## 2017-08-07 ENCOUNTER — HOSPITAL ENCOUNTER (OUTPATIENT)
Dept: ULTRASOUND IMAGING | Facility: CLINIC | Age: 37
Discharge: HOME | End: 2017-08-07
Attending: OBSTETRICS & GYNECOLOGY | Admitting: OBSTETRICS & GYNECOLOGY
Payer: COMMERCIAL

## 2017-08-07 DIAGNOSIS — O26.90 PREGNANCY RELATED CONDITION, UNSPECIFIED TRIMESTER: ICD-10-CM

## 2017-08-07 DIAGNOSIS — O09.529 AMA (ADVANCED MATERNAL AGE) MULTIGRAVIDA 35+, UNSPECIFIED TRIMESTER: Primary | ICD-10-CM

## 2017-08-07 DIAGNOSIS — O09.521 AMA (ADVANCED MATERNAL AGE) MULTIGRAVIDA 35+, FIRST TRIMESTER: Primary | ICD-10-CM

## 2017-08-07 PROCEDURE — 76813 OB US NUCHAL MEAS 1 GEST: CPT

## 2017-08-07 PROCEDURE — 40000072 ZZH STATISTIC GENETIC COUNSELING, < 16 MIN: Mod: ZF | Performed by: GENETIC COUNSELOR, MS

## 2017-08-07 NOTE — PROGRESS NOTES
"Please see \"Imaging\" tab under \"Chart Review\" for details of today's US at the [facility].    Irving Morrell MD  Maternal-Fetal Medicine      "

## 2017-08-07 NOTE — MR AVS SNAPSHOT
After Visit Summary   8/7/2017    Allyn Mcqueen    MRN: [id number]           Patient Information     Date Of Birth          1980        Visit Information        Provider Department      8/7/2017 8:45 AM Bran Gonzalez GC [facility] Maternal Fetal Medicine [facility]        Today's Diagnoses     AMA (advanced maternal age) multigravida 35+, first trimester    -  1    Pregnancy related condition, unspecified trimester           Follow-ups after your visit        Your next 10 appointments already scheduled     Sep 25, 2017 11:45 AM CDT   MFM US COMP with [id number]   [facility] Maternal Fetal Medicine Ultrasound - [place] ([facility])    [address]  [facility] [address] [phone]           Wear comfortable clothes and leave your valuables at home.            Sep 25, 2017 12:15 PM CDT   Radiology MD with UR MFM MD   [facility] Maternal Fetal Medicine - [facility])    [address]  [facility] [address] [phone]           Please arrive at the time given for your first appointment. This visit is used internally to schedule the physician's time during your ultrasound.              Future tests that were ordered for you today     Open Future Orders        Priority Expected Expires Ordered    MFM US Comprehensive Single Routine 9/25/2017 6/7/2018 8/7/2017    Non Invasive Prenatal Test Cell Free DNA Routine 8/14/2017 8/7/2018 8/7/2017            Who to contact     If you have questions or need follow up information about today's clinic visit or your schedule please contact [facility]ER[facility] FETAL [facility]IC[facility] directly at [phone].  Normal or non-critical lab and imaging results will be communicated to you by MyChart, letter or phone within 4 business days after the clinic has received the results. If you do not hear from us within 7 days, please contact the clinic through  [employer] or phone. If you have a critical or abnormal lab result, we will notify you by phone as soon as possible.  Submit refill requests through [employer] or call your pharmacy and they will forward the refill request to us. Please allow 3 business days for your refill to be completed.          Additional Information About Your Visit        [employer] Information     [employer] gives you secure access to your electronic health record. If you see a primary care provider, you can also send messages to your care team and make appointments. If you have questions, please call your primary care clinic.  If you do not have a primary care provider, please call [phone] and they will assist you.        Care EveryWhere ID     This is your Care EveryWhere ID. This could be used by other organizations to access your [facility] medical records  [id number]        Your Vitals Were     Last Period                   05/15/2017            Blood Pressure from Last 3 Encounters:   07/26/17 102/68   07/07/17 100/72   04/17/17 106/72    Weight from Last 3 Encounters:   07/26/17 63.2 kg (139 lb 6.4 oz)   07/07/17 63.4 kg (139 lb 11.2 oz)   04/17/17 64 kg (141 lb)              We Performed the Following     [facility] Genetic Counseling        Primary Care Provider Office Phone # Fax #    Noelle Lundy [phone]       [facility] [address]  [facility] [address]        Equal Access to Services     THIERNO PERKINS : Hadii aad ku hadasho Soomaali, waaxda luqadaha, qaybta kaalmada adeegyada, yuliya urrutia haythelman hermelinda cruz . So [facility] [phone].    ATENCIÓN: Si habla español, tiene a ferrer disposición servicios gratuitos de asistencia lingüística. Llame al [phone].    We comply with applicable federal civil rights laws and [place] laws. We do not discriminate on the basis of race, color, national origin, age, disability sex, sexual orientation or gender identity.            Thank you!     Thank  you for choosing [facility]  for your care. Our goal is always to provide you with excellent care. Hearing back from our patients is one way we can continue to improve our services. Please take a few minutes to complete the written survey that you may receive in the mail after your visit with us. Thank you!             Your Updated Medication List - Protect others around you: Learn how to safely use, store and throw away your medicines at www.disposemymeds.org.          This list is accurate as of: 8/7/17 11:40 AM.  Always use your most recent med list.                   Brand Name Dispense Instructions for use Diagnosis    prenatal multivitamin plus iron 27-0.8 MG Tabs per tablet      Take 1 tablet by mouth daily Reported on 3/9/2017

## 2017-08-07 NOTE — MR AVS SNAPSHOT
After Visit Summary   8/7/2017    Allyn Mcqueen    MRN: [id number]           Patient Information     Date Of Birth          1980        Visit Information        Provider Department      8/7/2017 10:00 AM Irving Morrell MD [facility]        Today's Diagnoses     AMA (advanced maternal age) multigravida 35+, unspecified trimester    -  1       Follow-ups after your visit        Your next 10 appointments already scheduled     Sep 25, 2017 11:45 AM CDT   [facility] US COMP with [id number]   [facility] Ultrasound - [facility])    [address]  [place] [id number] [phone]           Wear comfortable clothes and leave your valuables at home.            Sep 25, 2017 12:15 PM CDT   Radiology MD with UR CARLOS GONZALEZ   [facility] - [facility])    [address]  [facility] [id number] [phone]           Please arrive at the time given for your first appointment. This visit is used internally to schedule the physician's time during your ultrasound.              Future tests that were ordered for you today     Open Future Orders        Priority Expected Expires Ordered    [facility] US Comprehensive Single Routine 9/25/2017 6/7/2018 8/7/2017    Non Invasive Prenatal Test Cell Free DNA Routine 8/14/2017 8/7/2018 8/7/2017            Who to contact     If you have questions or need follow up information about today's clinic visit or your schedule please contact [facility] directly at [phone].  Normal or non-critical lab and imaging results will be communicated to you by MyChart, letter or phone within 4 business days after the clinic has received the results. If you do not hear from us within 7 days, please contact the clinic through MyChart or phone. If you have a critical or abnormal lab  result, we will notify you by phone as soon as possible.  Submit refill requests through [employer] or call your pharmacy and they will forward the refill request to us. Please allow 3 business days for your refill to be completed.          Additional Information About Your Visit        [employer] Information     [employer] gives you secure access to your electronic health record. If you see a primary care provider, you can also send messages to your care team and make appointments. If you have questions, please call your primary care clinic.  If you do not have a primary care provider, please call [phone] and they will assist you.        Care EveryWhere ID     This is your Care EveryWhere ID. This could be used by other organizations to access your [place] medical records  [id number]        Your Vitals Were     Last Period                   05/15/2017            Blood Pressure from Last 3 Encounters:   07/26/17 102/68   07/07/17 100/72   04/17/17 106/72    Weight from Last 3 Encounters:   07/26/17 63.2 kg (139 lb 6.4 oz)   07/07/17 63.4 kg (139 lb 11.2 oz)   04/17/17 64 kg (141 lb)               Primary Care Provider Office Phone # Fax #    Noelle Snehal Lundy [phone]       [facility] [address]  [facility] [address]        Equal Access to Services     JALIL PERKINS AH: Hadii aad ku hadasho Soomaali, waaxda luqadaha, qaybta kaalmada adeegyada, waxrobert urrutia haylindsey wilson. So [facility] [phone].    ATENCIÓN: Si habla español, tiene a ferrer disposición servicios gratuitos de asistencia lingüística. Llame al [phone].    We comply with applicable federal civil rights laws and [place] laws. We do not discriminate on the basis of race, color, national origin, age, disability sex, sexual orientation or gender identity.            Thank you!     Thank you for choosing [facility]  for your care. Our goal is always to provide you with  excellent care. Hearing back from our patients is one way we can continue to improve our services. Please take a few minutes to complete the written survey that you may receive in the mail after your visit with us. Thank you!             Your Updated Medication List - Protect others around you: Learn how to safely use, store and throw away your medicines at www.disposemymeds.org.          This list is accurate as of: 8/7/17 10:27 AM.  Always use your most recent med list.                   Brand Name Dispense Instructions for use Diagnosis    prenatal multivitamin plus iron 27-0.8 MG Tabs per tablet      Take 1 tablet by mouth daily Reported on 3/9/2017

## 2017-08-07 NOTE — PROGRESS NOTES
[facility]  Genetic Counseling Consult    Patient: Allyn Mcqueen YOB: 1980   Date of Service: 17      Allyn Mcqueen was seen at [facility] for genetic consultation to discuss the options for screening and testing for fetal chromosome abnormalities.  The indication for genetic counseling is advanced maternal age.        Impression/Plan:   1.  Allyn had an ultrasound and will return to the lab in 1 week (17) for a blood draw for NIPT (Innatal test through [employer]).  Results are expected within 7-10 days from when the blood is drawn, and will be available in [facility].  We will contact her to discuss the results, and a copy will be forwarded to the office of the referring OB provider.    2.  Maternal serum AFP (single marker screen) is recommended after 15 weeks to screen for open neural tube defects. A quad screen should not be performed.    3.  An 18-20 week comprehensive ultrasound is standard of care for all women 35 or older at delivery.    Pregnancy History:   /Parity:    Age at Delivery: 37 year old  KATTY: 2018, by Last Menstrual Period  Gestational Age: 12w0d    This pregnancy is complicated by a di/di twin pregnancy with demise of one twin.  CRL dates the demised twin at 8w6d at demise.    Allyn hauser pregnancy history is significant for 1 full term pregnancy with no reported complications.    Medical History:   Allyn hauser reported medical history is not expected to impact pregnancy management or risks to fetal development.       Family History:   A three-generation pedigree was obtained during a prior genetic counseling appointment, and is scanned under the  Media  tab.     There were no significant updates to the family history other than the addition of their son, who is now 2 years old, healthy and developmentally on track.    Otherwise, the reported family history is negative for  multiple miscarriages, stillbirths, birth defects, mental retardation, known genetic conditions, and consanguinity.       Carrier Screening:   The patient reports that she and the father of the pregnancy have  ancestry:      Cystic fibrosis is an autosomal recessive genetic condition that occurs with increased frequency in individuals of  ancestry and carrier screening for this condition is available.  In addition,  screening in the [facility] includes cystic fibrosis.      Expanded carrier screening for mutations in a large panel of genes associated with autosomal recessive conditions including cystic fibrosis, spinal muscular atrophy, and others, is now available.      Carrier screening was not discussed today.       Risk Assessment for Chromosome Conditions:   We explained that the risk for fetal chromosome abnormalities increases with maternal age. We discussed specific features of common chromosome abnormalities, including Down syndrome, trisomy 13, trisomy 18, and sex chromosome trisomies.      - At age 36 at midtrimester, the risk to have a baby with Down syndrome is 1 in 129.     - At age 36 at midtrimester, the risk to have a baby with any chromosome abnormality is 1 in 65.     - At age 37 at delivery, the risk to have a baby with Down syndrome is 1 in 137.    - At age 37 at delivery, the risk to have a baby with any chromosome abnormality is 1 in 82.          Testing Options:   We discussed the following options:   First trimester screening    First trimester ultrasound with nuchal translucency and nasal bone assessments, maternal plasma hCG, MAY-A, and AFP measurement    Screens for fetal trisomy 21, trisomy 13, and trisomy 18    Cannot screen for open neural tube defects; maternal serum AFP after 15 weeks is recommended     Non-invasive Prenatal Testing (NIPT)    Maternal plasma cell-free DNA testing; first trimester ultrasound with nuchal translucency and nasal bone  assessment is recommended, when appropriate    Screens for fetal trisomy 21, trisomy 13, trisomy 18, and sex chromosome aneuploidy    Cannot screen for open neural tube defects; maternal serum AFP after 15 weeks is recommended     Chorionic villus sampling (CVS)    Invasive procedure typically performed in the first trimester by which placental villi are obtained for the purpose of chromosome analysis and/or other prenatal genetic analysis    Diagnostic results; >99% sensitivity for fetal chromosome abnormalities    Cannot test for open neural tube defects; maternal serum AFP after 15 weeks is recommended     Quad screen:     Maternal plasma AFP, hCG, estriol, and inhibin measurement between 15-24 weeks gestation    Provides risk assessment for fetal Down syndrome, trisomy 18, and neural tube defects     Genetic Amniocentesis    Invasive procedure typically performed in the second trimester by which amniotic fluid is obtained for the purpose of chromosome analysis and/or other prenatal genetic analysis    Diagnostic results; >99% sensitivity for fetal chromosome abnormalities    AFAFP measurement tests for open neural tube defects     Comprehensive (Level II) ultrasound:     Detailed ultrasound performed between 18-22 weeks gestation.    Screen for major birth defects and markers for aneuploidy.    We reviewed the benefits and limitations of this testing.  Screening tests provide a risk assessment specific to the pregnancy for certain fetal chromosome abnormalities, but cannot definitively diagnose or exclude a fetal chromosome abnormality.  Follow-up genetic counseling and consideration of diagnostic testing is recommended with any abnormal screening result.     Diagnostic tests carry inherent risks- including risk of miscarriage- that require careful consideration.  These tests can detect fetal chromosome abnormalities with greater than 99% certainty.  Results can be compromised by maternal cell contamination or  mosaicism, and are limited by the resolution of cytogenetic G-banding technology.  There is no screening nor diagnostic test that can detect all forms of birth defects or mental disability.    A majority of our time today was spent in discussion regarding the impact a demised twin has on the various aneuploidy testing options that are available to the couple.  In general, the closer in time from the demise of the twin to when the test is performed, the more likely the test is to be impacted.  The labs we use for first trimester screening and NIPT both recommend waiting at least 3-4 weeks after the demise of the twin before having screening testing done.  Ultrasound dating put the demise of the twin at approximately 8w6d, which would make a gestational age of 13w0d (8/14/17) as 4 weeks past the demise of twin B.      Fetal twin demise can impact NIPT testing in a number of ways.  It is possible that the demised twin had a chromosome abnormality, which will be detected by the test; it is possible that residual DNA from the demised fetus will impact the results of the test, regardless of aneuploidy status.  A positive test result in this situation is less informative than when done on a nogueira pregnancy.  The couple understands these limitations and the uncertainty that would come with a positive result.  Also discussed was the limitations of detecting sex chromosomes in this setting.  The couple is comfortable with pursuing this testing, but would like to wait a week to increase confidence in the test results.  They were provided with the test kit to take to the lab on 8/14/17 for the blood draw.       It was a pleasure to be involved with Allyn hauser care. Face-to-face time of the meeting was 40 minutes.    Bran Gonzalez MS  Genetic Counselor  Phone: [phone]  Pager: [phone]

## 2017-08-14 DIAGNOSIS — O09.521 AMA (ADVANCED MATERNAL AGE) MULTIGRAVIDA 35+, FIRST TRIMESTER: ICD-10-CM

## 2017-08-14 PROCEDURE — 40000791 ZZHCL STATISTIC VERIFI PRENATAL TRISOMY 21,18,13: Performed by: OBSTETRICS & GYNECOLOGY

## 2017-08-14 PROCEDURE — 36415 COLL VENOUS BLD VENIPUNCTURE: CPT | Performed by: OBSTETRICS & GYNECOLOGY

## 2017-08-24 ENCOUNTER — TELEPHONE (OUTPATIENT)
Dept: MATERNAL FETAL MEDICINE | Facility: CLINIC | Age: 37
End: 2017-08-24

## 2017-08-24 NOTE — TELEPHONE ENCOUNTER
8/24/2017       Called Allyn to discuss NIPT results.  Results came back negative for chromosome abnormalities in chromosomes 21, 18, & 13, as well as the sex chromosomes.  The test identified sex chromosomes consistent with male sex (XY).  We discussed that this test does not completely rule out the possibility of a chromosome abnormality with these chromosomes, but does greatly reduce the likelihood.  We also discussed the possibility of the known twin demise in the pregnancy impacting this test result.  It is possible for a twin demise to cause inaccurate test results with this test, but based on [employer]'s recommendations, testing was delayed until outside the window when DNA from the demised twin might impact test results.  Specifically discussed was the possibility for the sex reported on the test to be wrong.  It is possible for the test to detect residual Y chromosome material from the demised twin and return a male result, even if the living fetus is female.  Sex can typically be assessed by ultrasound during a comprehensive scan at 18-20 weeks, and Allyn has this ultrasound scheduled already.  Allyn had no questions at this time and was encouraged to reach out if she has any questions or concerns in the future.       Bran Gonzalez MS  Genetic Counselor  Phone: [phone]  Pager: [phone]

## 2017-08-27 LAB — LAB SCANNED RESULT: NORMAL

## 2017-08-28 ENCOUNTER — PRENATAL OFFICE VISIT (OUTPATIENT)
Dept: OBGYN | Facility: CLINIC | Age: 37
End: 2017-08-28
Payer: COMMERCIAL

## 2017-08-28 VITALS
DIASTOLIC BLOOD PRESSURE: 73 MMHG | SYSTOLIC BLOOD PRESSURE: 110 MMHG | WEIGHT: 145 LBS | BODY MASS INDEX: 24.89 KG/M2 | HEART RATE: 85 BPM | TEMPERATURE: 98.2 F

## 2017-08-28 DIAGNOSIS — O09.522 AMA (ADVANCED MATERNAL AGE) MULTIGRAVIDA 35+, SECOND TRIMESTER: ICD-10-CM

## 2017-08-28 DIAGNOSIS — O31.20X0 CONTINUING PREGNANCY AFTER INTRAUTERINE DEATH OF ONE TWIN WITH INTRAUTERINE RETENTION: ICD-10-CM

## 2017-08-28 DIAGNOSIS — Z34.82 ENCOUNTER FOR SUPERVISION OF OTHER NORMAL PREGNANCY IN SECOND TRIMESTER: Primary | ICD-10-CM

## 2017-08-28 PROCEDURE — 99207 ZZC PRENATAL VISIT: CPT | Performed by: OBSTETRICS & GYNECOLOGY

## 2017-08-28 PROCEDURE — 99000 SPECIMEN HANDLING OFFICE-LAB: CPT | Performed by: OBSTETRICS & GYNECOLOGY

## 2017-08-28 PROCEDURE — 36415 COLL VENOUS BLD VENIPUNCTURE: CPT | Performed by: OBSTETRICS & GYNECOLOGY

## 2017-08-28 PROCEDURE — 82105 ALPHA-FETOPROTEIN SERUM: CPT | Mod: 90 | Performed by: OBSTETRICS & GYNECOLOGY

## 2017-08-28 NOTE — PROGRESS NOTES
Doing well, had normal first trimester screen.  Discussed MSAFP and modified sequential screen; she will do MSAFP here.  Has comprehensive US scheduled at [facility] for 19 weeks.  RTC 4 weeks.  LT

## 2017-08-28 NOTE — MR AVS SNAPSHOT
After Visit Summary   8/28/2017    Allyn Mcqueen    MRN: [id number]           Patient Information     Date Of Birth          1980        Visit Information        Provider Department      8/28/2017 3:00 PM Kathryn Caldwell MD [facility]        Today's Diagnoses     Encounter for supervision of other normal pregnancy in second trimester    -  1    AMA (advanced maternal age) multigravida 35+, second trimester        Continuing pregnancy after intrauterine death of one twin with intrauterine retention           Follow-ups after your visit        Follow-up notes from your care team     Return in about 4 weeks (around 9/25/2017).      Your next 10 appointments already scheduled     Sep 25, 2017 11:45 AM CDT   MFM US COMP with [id number]   [facility] - [facility])    [address]  [facility] [address] [phone]           Wear comfortable clothes and leave your valuables at home.            Sep 25, 2017 12:15 PM CDT   Radiology MD with UR CARLOS GONZALEZ   [facility] - [facility])    [address]  [facility] [id number] [phone]           Please arrive at the time given for your first appointment. This visit is used internally to schedule the physician's time during your ultrasound.              Who to contact     If you have questions or need follow up information about today's clinic visit or your schedule please contact [facility] directly at [phone].  Normal or non-critical lab and imaging results will be communicated to you by MyChart, letter or phone within 4 business days after the clinic has received the results. If you do not hear from us within 7 days, please contact the clinic through MyChart or phone. If you have a critical or abnormal lab result, we will notify you by phone as soon as  possible.  Submit refill requests through [employer] or call your pharmacy and they will forward the refill request to us. Please allow 3 business days for your refill to be completed.          Additional Information About Your Visit        [employer] Information     [employer] gives you secure access to your electronic health record. If you see a primary care provider, you can also send messages to your care team and make appointments. If you have questions, please call your primary care clinic.  If you do not have a primary care provider, please call [phone] and they will assist you.        Care EveryWhere ID     This is your Care EveryWhere ID. This could be used by other organizations to access your [place] medical records  [id number]        Your Vitals Were     Pulse Temperature Last Period BMI (Body Mass Index)          85 98.2  F (36.8  C) (Oral) 05/15/2017 24.89 kg/m2         Blood Pressure from Last 3 Encounters:   08/28/17 110/73   07/26/17 102/68   07/07/17 100/72    Weight from Last 3 Encounters:   08/28/17 145 lb (65.8 kg)   07/26/17 139 lb 6.4 oz (63.2 kg)   07/07/17 139 lb 11.2 oz (63.4 kg)              We Performed the Following     Alpha fetoprotein maternal screen        Primary Care Provider Office Phone # Fax #    Noelle Lundy [phone] [address]  [facility] [address]        Equal Access to Services     [facility] : Hadii aad ku hadasho Sokristina, waaxda luqadaha, qaybta kaalmada adesoledadyada, yuliya cruz . So [facility] [phone].    ATENCIÓN: Si habla español, tiene a ferrer disposición servicios gratuitos de asistencia lingüística. Llame al [phone].    We comply with applicable federal civil rights laws and [place] laws. We do not discriminate on the basis of race, color, national origin, age, disability sex, sexual orientation or gender identity.            Thank you!     Thank you for choosing [facility]  for  your care. Our goal is always to provide you with excellent care. Hearing back from our patients is one way we can continue to improve our services. Please take a few minutes to complete the written survey that you may receive in the mail after your visit with us. Thank you!             Your Updated Medication List - Protect others around you: Learn how to safely use, store and throw away your medicines at www.disposemymeds.org.          This list is accurate as of: 8/28/17  3:19 PM.  Always use your most recent med list.                   Brand Name Dispense Instructions for use Diagnosis    prenatal multivitamin plus iron 27-0.8 MG Tabs per tablet      Take 1 tablet by mouth daily Reported on 3/9/2017

## 2017-09-01 LAB
# FETUSES US: NORMAL
AFP ADJ MOM AMN: 0.79
AFP SERPL-MCNC: 48 NG/ML
AGE - REPORTED: 39.3 YR
DATING METHOD: NORMAL
DIABETIC AT CONCEPTION: NO
FAMILY MEMBER DISEASES HX: NO
FAMILY MEMBER DISEASES HX: NO
GA METHOD: NORMAL
GA: 15 WEEKS
HX OF HEREDITARY DISORDERS: NO
IDDM PATIENT QL: NO
INTEGRATED SCN PATIENT-IMP: NORMAL
LMP START DATE: NORMAL
PREV HX CHROMOSOME ABNORMALITY: NORMAL
SPECIMEN DRAWN SERPL: NORMAL
TWINS: NORMAL

## 2017-09-18 ENCOUNTER — NURSE TRIAGE (OUTPATIENT)
Dept: NURSING | Facility: CLINIC | Age: 37
End: 2017-09-18

## 2017-09-18 ENCOUNTER — HOSPITAL ENCOUNTER (EMERGENCY)
Facility: CLINIC | Age: 37
Discharge: HOME | End: 2017-09-18
Attending: EMERGENCY MEDICINE | Admitting: EMERGENCY MEDICINE
Payer: COMMERCIAL

## 2017-09-18 VITALS
RESPIRATION RATE: 16 BRPM | SYSTOLIC BLOOD PRESSURE: 121 MMHG | WEIGHT: 147.7 LBS | DIASTOLIC BLOOD PRESSURE: 63 MMHG | OXYGEN SATURATION: 100 % | BODY MASS INDEX: 25.35 KG/M2 | TEMPERATURE: 97.4 F

## 2017-09-18 DIAGNOSIS — N94.9 VAGINAL DISCOMFORT: ICD-10-CM

## 2017-09-18 DIAGNOSIS — O26.892 OTHER SPECIFIED PREGNANCY RELATED CONDITIONS, SECOND TRIMESTER: ICD-10-CM

## 2017-09-18 DIAGNOSIS — Z3A.18 18 WEEKS GESTATION OF PREGNANCY: ICD-10-CM

## 2017-09-18 LAB
SPECIMEN SOURCE: NORMAL
WET PREP SPEC: NORMAL

## 2017-09-18 PROCEDURE — 99213 OFFICE O/P EST LOW 20 MIN: CPT | Mod: GC | Performed by: OBSTETRICS & GYNECOLOGY

## 2017-09-18 PROCEDURE — 87210 SMEAR WET MOUNT SALINE/INK: CPT | Performed by: EMERGENCY MEDICINE

## 2017-09-18 PROCEDURE — 99283 EMERGENCY DEPT VISIT LOW MDM: CPT | Mod: Z6 | Performed by: EMERGENCY MEDICINE

## 2017-09-18 PROCEDURE — 99284 EMERGENCY DEPT VISIT MOD MDM: CPT | Performed by: EMERGENCY MEDICINE

## 2017-09-18 ASSESSMENT — ENCOUNTER SYMPTOMS
ABDOMINAL PAIN: 0
DYSURIA: 0
FEVER: 0
NAUSEA: 0
COUGH: 0
VOMITING: 0

## 2017-09-18 NOTE — ED AVS SNAPSHOT
[facility], Emergency Department    [address]    [facility] [address]    Phone:  [phone]    Fax:  [phone]                                       Allyn Mcqueen   MRN: [id number]    Department:  [facility], Emergency Department   Date of Visit:  9/18/2017           After Visit Summary Signature Page     I have received my discharge instructions, and my questions have been answered. I have discussed any challenges I see with this plan with the nurse or doctor.    ..........................................................................................................................................  Patient/Patient Representative Signature      ..........................................................................................................................................  Patient Representative Print Name and Relationship to Patient    ..................................................               ................................................  Date                                            Time    ..........................................................................................................................................  Reviewed by Signature/Title    ...................................................              ..............................................  Date                                                            Time

## 2017-09-18 NOTE — TELEPHONE ENCOUNTER
Reason for Disposition    [1] Intermittent lower abdominal pain AND [2] present > 24 hours    Additional Information    Negative: [1] Vaginal bleeding AND [2] pregnant > 20 weeks    Negative: [1] Vaginal bleeding AND [2] pregnant < 20 weeks    Negative: [1] Having contractions or other symptoms of labor AND [2] < 37 weeks pregnant (i.e., )    Negative: [1] Having contractions or other symptoms of labor AND [2] > 36 weeks pregnant (i.e., term pregnancy)    Negative: Leakage of fluid (trickle, gush) from the vagina    Negative: Foreign body in vagina (e.g., tampon)    Negative: Pain or burning with urination is main symptom    Negative: [1] Pregnant 23 or more weeks AND [2] baby is moving less today (e.g., kick count < 5 in 1 hour or < 10 in 2 hours)    Negative: Patient sounds very sick or weak to the triager    Negative: [1] Constant abdominal pain AND [2] present > 2 hours    Protocols used: PREGNANCY - VAGINAL DISCHARGE-ADULT-

## 2017-09-18 NOTE — ED AVS SNAPSHOT
[facility], Emergency Department    [address]    [place] [address]    Phone:  [phone]    Fax:  [phone]                                       Allyn Mcqueen   MRN: [id number]    Department:  [facility], Emergency Department   Date of Visit:  9/18/2017           Patient Information     Date Of Birth          1980        Your diagnoses for this visit were:     Vaginal discomfort        You were seen by Angelo Mora MD.        Discharge Instructions       Follow-up with your doctor as planned.    Return if any concerns.    Future Appointments        Provider Department Dept Phone Center    9/28/2017 11:00 AM UR US Room 3 [facility] [place] [phone] [place]    9/28/2017 11:30 AM UR CARLOS GONZALEZ [facility] [place] [phone] [place]    9/29/2017 10:00 AM Mireille Figueroa MD [facility] [phone] [facility]      24 Hour Appointment Hotline       To make an appointment at any [facility], call [phone] ([phone]). If you don't have a family doctor or clinic, we will help you find one. [facility] are conveniently located to serve the needs of you and your family.             Review of your medicines      Our records show that you are taking the medicines listed below. If these are incorrect, please call your family doctor or clinic.        Dose / Directions Last dose taken    prenatal multivitamin plus iron 27-0.8 MG Tabs per tablet   Dose:  1 tablet   Indication:  Pregnancy        Take 1 tablet by mouth daily Reported on 3/9/2017   Refills:  0                Procedures and tests performed during your visit     Wet prep      Orders Needing Specimen Collection     None      Pending Results     No orders found from 9/16/2017 to 9/19/2017.            Pending Culture Results     No orders found from 9/16/2017 to 9/19/2017.            Pending Results Instructions     If you had any lab results  that were not finalized at the time of your Discharge, you can call the ED Lab Result RN at [phone]. You will be contacted by this team for any positive Lab results or changes in treatment. The nurses are available 7 days a week from 10A to 6:30P.  You can leave a message 24 hours per day and they will return your call.        Thank you for choosing [facility]       Thank you for choosing [facility] for your care. Our goal is always to provide you with excellent care. Hearing back from our patients is one way we can continue to improve our services. Please take a few minutes to complete the written survey that you may receive in the mail after you visit with us. Thank you!        Informatics In ContextharCardiocore Information     Advaxis gives you secure access to your electronic health record. If you see a primary care provider, you can also send messages to your care team and make appointments. If you have questions, please call your primary care clinic.  If you do not have a primary care provider, please call [phone] and they will assist you.        Care EveryWhere ID     This is your Care EveryWhere ID. This could be used by other organizations to access your [facility] medical records  [id number]        Equal Access to Services     JALIL PERKINS : Terrence Mo, shalonda sanderson, yuliya helms . So [facility] [phone].    ATENCIÓN: Si habla español, tiene a ferrer disposición servicios gratuitos de asistencia lingüística. Jennaame al [phone].    We comply with applicable federal civil rights laws and [place] laws. We do not discriminate on the basis of race, color, national origin, age, disability sex, sexual orientation or gender identity.            After Visit Summary       This is your record. Keep this with you and show to your community pharmacist(s) and doctor(s) at your next visit.

## 2017-09-19 NOTE — ED PROVIDER NOTES
History     Chief Complaint   Patient presents with     Abdominal Pain     Pt. is 18 weeks pregnant     HPI  Allyn Mcqueen is a 36 year old female who emergency emergency department with a sensation of fullness in her vagina. Patient is 18 weeks pregnant.  She has a twin pregnancy but only one of the fetuses is viable.  The patient reports some mild superior pubic cramping.  She denies any vaginal bleeding, fluid leakage, or discharge.  She denies any dysuria, urgency, or frequency. She did contact her OB Clinic prior to coming to the ED.    I have reviewed the Medications, Allergies, Past Medical and Surgical History, and Social History in the Epic system.    Review of Systems   Constitutional: Negative for fever.   Respiratory: Negative for cough.    Cardiovascular: Negative for chest pain.   Gastrointestinal: Negative for abdominal pain, nausea and vomiting.   Genitourinary: Positive for vaginal pain. Negative for dysuria, urgency, vaginal bleeding and vaginal discharge.   All other systems reviewed and are negative.      Physical Exam   BP: 121/63  Heart Rate: 88  Temp: 97.4  F (36.3  C)  Resp: 16  Weight: 67 kg (147 lb 11.2 oz)  SpO2: 100 %  Physical Exam   Constitutional: She appears well-developed and well-nourished.   Cardiovascular: Normal rate, regular rhythm and normal heart sounds.    Pulmonary/Chest: Effort normal and breath sounds normal. No respiratory distress.   Abdominal: Soft. Bowel sounds are normal. There is no tenderness.   Musculoskeletal: Normal range of motion.   Neurological: She is alert. She has normal strength. Gait normal.   Nursing note and vitals reviewed.      ED Course     ED Course     Procedures            Critical Care time:    Seen by OB-Gyn-  Cervix closed.  Bedside ultrasound shows single viable fetus.         Assessments & Plan (with Medical Decision Making)   36 year old female to the emergency department with a sensation of fullness in her vagina. The patient is  pregnant.  She has a twin pregnancy but only one of the fetuses is viable.  The patient was seen by OB-Gyn in the emergency department.  She has a normal pelvic exam.  A bedside ultrasound revealed a live IUP.  The patient will be discharged home.  She will follow-up with her OB-gyn as planned.    I have reviewed the nursing notes.    I have reviewed the findings, diagnosis, plan and need for follow up with the patient.    Discharge Medication List as of 9/18/2017  8:53 PM          Final diagnoses:   Vaginal discomfort       9/18/2017   [facility] [place], EMERGENCY DEPARTMENT     Angelo Mora MD  09/18/17 [phone]

## 2017-09-19 NOTE — ED NOTES
Started around 1815 pt. states she started feeling like something was stuck in her vagina.  Pt. denies vag. bleeding but is having some cramping at this time.

## 2017-09-19 NOTE — CONSULTS
Gynecology Consult Note    HPI: Allyn Mcqueen is a 36 year old  at 18w0d by LMP c/w 10w5d US presenting complaining of a abdominal cramping and the sensation of something in the vagina.  Reports that at 6pm this evening had a bowel movement, then subsequently had abdominal cramping and a sensation that there was something in her vagina. Denies any vaginal bleeding, leaking of fluid.  Reports occasional fetal movement, though not consistent.  Took a mirror to look and thought she saw tissue coming from her vagina that she had not seen before. Called the nurse line and was instructed to come in. Otherwise feeling well. Denies any nausea, vomiting, chest pain, shortness of breath, fevers, or chills.      OB/GYNHx: ,  in   Le twin gestation with demise of twin A in 1st trimester      PMH:   Past Medical History:   Diagnosis Date     Ovarian cyst rupture 2010     PSH:   Past Surgical History:   Procedure Laterality Date     C SPINAL FUSION,ANT,EA ADNL LEVEL      fused cervical vertebrae ?c3-4       Social Hx:   Social History   Substance Use Topics     Smoking status: Never Smoker     Smokeless tobacco: Never Used     Alcohol use No        Family History: family history includes CANCER in her paternal aunt; CEREBROVASCULAR DISEASE in her maternal grandmother; HEART DISEASE in her maternal grandmother; Lipids in her father; MENTAL ILLNESS in her mother; Psychotic Disorder in her mother; Respiratory in her mother.  No family history of breast, ovarian or uterine cancer. No history of DVT or migranes.    ROS:   Negative except per HPI    Objective:   /63  Temp 97.4  F (36.3  C) (Oral)  Resp 16  Wt 67 kg (147 lb 11.2 oz)  LMP 05/15/2017  SpO2 100%  BMI 25.35 kg/m2  Constitutional: Healthy appearing female, no acute distress  Pelvic: normal appearing labia bilaterally, moderate amount of physiological appearing white discharge without odor. Small hymenal remnant at the posterior  introitus  Cervix: C/L/H  SSE: Cervix appears closed, white discharge coating vaginal walls, no blood coming from os    BSUS: normal appearing fetus with movement noted, appropriate amount of fluid.  HR 147bpm    Labs/Imaging:  Results for orders placed or performed during the hospital encounter of 17   Wet prep   Result Value Ref Range    Specimen Description Cervix     Wet Prep Rare  PMNs seen       Wet Prep No clue cells seen     Wet Prep No yeast seen     Wet Prep No Trichomonas seen       Assessment/Plan:   Allyn Mcqueen is a 36 year old  at 18w0d by LMP c/w 10w5d ultrasound presenting with concerns for abdominal cramping and tissue in her vagina.  Reassuring fetal status on ultrasound with appropriate HR.  Also reassuring vaginal exam, appears as though patient has a small amount of prolapse of vaginal tissue that appeared abnormal to her on inspection. Cervix is closed and patient does not appear to be laboring.  Wet prep negative.  Reassurance given and questions answered.  Stable for discharge.    Follow-up as scheduled with OB provider.  Patient has anatomy ultrasound     Discussed with Dr. Matthieu Parish MD  OBGYN PGY-3  7:59 PM 2017

## 2017-09-28 ENCOUNTER — HOSPITAL ENCOUNTER (OUTPATIENT)
Dept: ULTRASOUND IMAGING | Facility: CLINIC | Age: 37
Discharge: HOME | End: 2017-09-28
Attending: OBSTETRICS & GYNECOLOGY | Admitting: OBSTETRICS & GYNECOLOGY
Payer: COMMERCIAL

## 2017-09-28 ENCOUNTER — OFFICE VISIT (OUTPATIENT)
Dept: MATERNAL FETAL MEDICINE | Facility: CLINIC | Age: 37
End: 2017-09-28
Attending: OBSTETRICS & GYNECOLOGY
Payer: COMMERCIAL

## 2017-09-28 DIAGNOSIS — O09.522 AMA (ADVANCED MATERNAL AGE) MULTIGRAVIDA 35+, SECOND TRIMESTER: Primary | ICD-10-CM

## 2017-09-28 DIAGNOSIS — O09.529 AMA (ADVANCED MATERNAL AGE) MULTIGRAVIDA 35+, UNSPECIFIED TRIMESTER: ICD-10-CM

## 2017-09-28 PROCEDURE — 76811 OB US DETAILED SNGL FETUS: CPT

## 2017-09-28 NOTE — PROGRESS NOTES
"Please see \"Imaging\" tab under \"Chart Review\" for details of today's US at the [facility].    Irving Morrell MD  Maternal-Fetal Medicine      "

## 2017-09-29 ENCOUNTER — PRENATAL OFFICE VISIT (OUTPATIENT)
Dept: OBGYN | Facility: CLINIC | Age: 37
End: 2017-09-29
Payer: COMMERCIAL

## 2017-09-29 VITALS
DIASTOLIC BLOOD PRESSURE: 73 MMHG | BODY MASS INDEX: 25.4 KG/M2 | HEART RATE: 81 BPM | SYSTOLIC BLOOD PRESSURE: 113 MMHG | TEMPERATURE: 97.6 F | HEIGHT: 64 IN | WEIGHT: 148.8 LBS

## 2017-09-29 DIAGNOSIS — O09.522 AMA (ADVANCED MATERNAL AGE) MULTIGRAVIDA 35+, SECOND TRIMESTER: Primary | ICD-10-CM

## 2017-09-29 DIAGNOSIS — Z23 NEED FOR PROPHYLACTIC VACCINATION AND INOCULATION AGAINST INFLUENZA: ICD-10-CM

## 2017-09-29 PROCEDURE — 99207 ZZC PRENATAL VISIT: CPT | Performed by: OBSTETRICS & GYNECOLOGY

## 2017-09-29 PROCEDURE — 90471 IMMUNIZATION ADMIN: CPT | Performed by: OBSTETRICS & GYNECOLOGY

## 2017-09-29 PROCEDURE — 90686 IIV4 VACC NO PRSV 0.5 ML IM: CPT | Performed by: OBSTETRICS & GYNECOLOGY

## 2017-09-29 NOTE — PROGRESS NOTES
19w4d Feeling good, no c/o.  Starting to feel more mvmt now.  Had normal level II, having another boy.  Flu shot today.  glucola next visit.  RTC 4-5wks  roxanne

## 2017-09-29 NOTE — MR AVS SNAPSHOT
After Visit Summary   9/29/2017    Allyn Mcqueen    MRN: [id number]           Patient Information     Date Of Birth          1980        Visit Information        Provider Department      9/29/2017 10:00 AM Mrieille Figueroa MD [facility]        Today's Diagnoses     AMA (advanced maternal age) multigravida 35+, second trimester    -  1       Follow-ups after your visit        Future tests that were ordered for you today     Open Future Orders        Priority Expected Expires Ordered    OB hemoglobin Routine  9/29/2018 9/29/2017    Glucose tolerance gest screen 1 hour Routine  12/29/2017 9/29/2017            Who to contact     If you have questions or need follow up information about today's clinic visit or your schedule please contact [facility] directly at [phone].  Normal or non-critical lab and imaging results will be communicated to you by MyChart, letter or phone within 4 business days after the clinic has received the results. If you do not hear from us within 7 days, please contact the clinic through Sharely.Ushart or phone. If you have a critical or abnormal lab result, we will notify you by phone as soon as possible.  Submit refill requests through Retroficiency or call your pharmacy and they will forward the refill request to us. Please allow 3 business days for your refill to be completed.          Additional Information About Your Visit        MyChart Information     Retroficiency gives you secure access to your electronic health record. If you see a primary care provider, you can also send messages to your care team and make appointments. If you have questions, please call your primary care clinic.  If you do not have a primary care provider, please call [phone] and they will assist you.        Care EveryWhere ID     This is your Care EveryWhere ID. This could be used by other organizations to access your [facility] medical records  [id number]        Your  "Vitals Were     Pulse Temperature Height Last Period BMI (Body Mass Index)       81 97.6  F (36.4  C) (Oral) 5' 4\" (1.626 m) 05/15/2017 25.54 kg/m2        Blood Pressure from Last 3 Encounters:   09/29/17 113/73   09/18/17 121/63   08/28/17 110/73    Weight from Last 3 Encounters:   09/29/17 148 lb 12.8 oz (67.5 kg)   09/18/17 147 lb 11.2 oz (67 kg)   08/28/17 145 lb (65.8 kg)               Primary Care Provider Office Phone # Fax #    Noelle Lundy [phone] [address] [phone]        Equal Access to Services     JALIL PERKINS : Hadii shwetha barnetto Sokristina, waaxda luqadaha, qaybta kaalmada adeegyada, yuliya wilson. So [facility] [phone].    ATENCIÓN: Si habla español, tiene a ferrer disposición servicios gratuitos de asistencia lingüística. Llame al [phone].    We comply with applicable federal civil rights laws and [place] laws. We do not discriminate on the basis of race, color, national origin, age, disability sex, sexual orientation or gender identity.            Thank you!     Thank you for choosing [facility]  for your care. Our goal is always to provide you with excellent care. Hearing back from our patients is one way we can continue to improve our services. Please take a few minutes to complete the written survey that you may receive in the mail after your visit with us. Thank you!             Your Updated Medication List - Protect others around you: Learn how to safely use, store and throw away your medicines at www.disposemymeds.org.          This list is accurate as of: 9/29/17 10:12 AM.  Always use your most recent med list.                   Brand Name Dispense Instructions for use Diagnosis    prenatal multivitamin plus iron 27-0.8 MG Tabs per tablet      Take 1 tablet by mouth daily Reported on 3/9/2017          "

## 2017-09-29 NOTE — PROGRESS NOTES
Injectable Influenza Immunization Documentation    1.  Is the person to be vaccinated sick today?   No    2. Does the person to be vaccinated have an allergy to a component   of the vaccine?   No    3. Has the person to be vaccinated ever had a serious reaction   to influenza vaccine in the past?   No    4. Has the person to be vaccinated ever had Guillain-Barré syndrome?   No    Form completed by [place]

## 2017-10-30 ENCOUNTER — PRENATAL OFFICE VISIT (OUTPATIENT)
Dept: OBGYN | Facility: CLINIC | Age: 37
End: 2017-10-30
Payer: COMMERCIAL

## 2017-10-30 VITALS — BODY MASS INDEX: 27.22 KG/M2 | SYSTOLIC BLOOD PRESSURE: 122 MMHG | WEIGHT: 158.6 LBS | DIASTOLIC BLOOD PRESSURE: 68 MMHG

## 2017-10-30 DIAGNOSIS — O09.522 AMA (ADVANCED MATERNAL AGE) MULTIGRAVIDA 35+, SECOND TRIMESTER: ICD-10-CM

## 2017-10-30 LAB
GLUCOSE 1H P 50 G GLC PO SERPL-MCNC: 151 MG/DL (ref 60–129)
HGB BLD-MCNC: 10.4 G/DL (ref 11.7–15.7)

## 2017-10-30 PROCEDURE — 99207 ZZC PRENATAL VISIT: CPT | Performed by: OBSTETRICS & GYNECOLOGY

## 2017-10-30 PROCEDURE — 00000218 ZZHCL STATISTIC OBHBG - HEMOGLOBIN: Performed by: OBSTETRICS & GYNECOLOGY

## 2017-10-30 PROCEDURE — 36415 COLL VENOUS BLD VENIPUNCTURE: CPT | Performed by: OBSTETRICS & GYNECOLOGY

## 2017-10-30 PROCEDURE — 82950 GLUCOSE TEST: CPT | Performed by: OBSTETRICS & GYNECOLOGY

## 2017-10-30 NOTE — MR AVS SNAPSHOT
After Visit Summary   10/30/2017    Allyn Mcqueen    MRN: [id number]           Patient Information     Date Of Birth          1980        Visit Information        Provider Department      10/30/2017 2:30 PM Mireille Figueroa MD [facility]        Today's Diagnoses     AMA (advanced maternal age) multigravida 35+, second trimester           Follow-ups after your visit        Who to contact     If you have questions or need follow up information about today's clinic visit or your schedule please contact [facility] directly at [phone].  Normal or non-critical lab and imaging results will be communicated to you by Heart to Heart Hospicehart, letter or phone within 4 business days after the clinic has received the results. If you do not hear from us within 7 days, please contact the clinic through Dizzywoodt or phone. If you have a critical or abnormal lab result, we will notify you by phone as soon as possible.  Submit refill requests through Merku or call your pharmacy and they will forward the refill request to us. Please allow 3 business days for your refill to be completed.          Additional Information About Your Visit        MyChart Information     Merku gives you secure access to your electronic health record. If you see a primary care provider, you can also send messages to your care team and make appointments. If you have questions, please call your primary care clinic.  If you do not have a primary care provider, please call [phone] and they will assist you.        Care EveryWhere ID     This is your Care EveryWhere ID. This could be used by other organizations to access your [facility] medical records  [id number]        Your Vitals Were     Last Period BMI (Body Mass Index)                05/15/2017 27.22 kg/m2           Blood Pressure from Last 3 Encounters:   10/30/17 122/68   09/29/17 113/73   09/18/17 121/63    Weight from Last 3 Encounters:   10/30/17 158 lb  9.6 oz (71.9 kg)   09/29/17 148 lb 12.8 oz (67.5 kg)   09/18/17 147 lb 11.2 oz (67 kg)              We Performed the Following     Glucose tolerance gest screen 1 hour     OB hemoglobin        Primary Care Provider Office Phone # Fax #    Noelle Lundy [phone]       [address]  [facility] [address]        Equal Access to Services     JALIL PERKINS : Hadii aad ku hadasho Soomaali, waaxda luqadaha, qaybta kaalmada adeegyada, waxay idiin hayaan adeeg kharash layanira . So [facility] [phone].    ATENCIÓN: Si habla espmima, tiene a ferrer disposición servicios gratuitos de asistencia lingüística. Llame al [phone].    We comply with applicable federal civil rights laws and [place] laws. We do not discriminate on the basis of race, color, national origin, age, disability, sex, sexual orientation, or gender identity.            Thank you!     Thank you for choosing [facility]  for your care. Our goal is always to provide you with excellent care. Hearing back from our patients is one way we can continue to improve our services. Please take a few minutes to complete the written survey that you may receive in the mail after your visit with us. Thank you!             Your Updated Medication List - Protect others around you: Learn how to safely use, store and throw away your medicines at www.disposemymeds.org.          This list is accurate as of: 10/30/17  2:59 PM.  Always use your most recent med list.                   Brand Name Dispense Instructions for use Diagnosis    prenatal multivitamin plus iron 27-0.8 MG Tabs per tablet      Take 1 tablet by mouth daily Reported on 3/9/2017

## 2017-10-30 NOTE — PROGRESS NOTES
24w0d Feeling ok, some occ LBP and constipation.  Discussed.  Has support belt someone gave her so may try that.  Good fm.  glucola today.  tdap next visit.  RTC 4 weeks roxanne

## 2017-11-03 ENCOUNTER — PRENATAL OFFICE VISIT (OUTPATIENT)
Dept: OBGYN | Facility: CLINIC | Age: 37
End: 2017-11-03
Payer: COMMERCIAL

## 2017-11-03 ENCOUNTER — E-VISIT (OUTPATIENT)
Dept: FAMILY MEDICINE | Facility: CLINIC | Age: 37
End: 2017-11-03

## 2017-11-03 VITALS
DIASTOLIC BLOOD PRESSURE: 76 MMHG | TEMPERATURE: 97.6 F | BODY MASS INDEX: 27.08 KG/M2 | WEIGHT: 158.6 LBS | SYSTOLIC BLOOD PRESSURE: 108 MMHG | HEART RATE: 87 BPM | HEIGHT: 64 IN

## 2017-11-03 DIAGNOSIS — O31.20X0 CONTINUING PREGNANCY AFTER INTRAUTERINE DEATH OF ONE TWIN WITH INTRAUTERINE RETENTION: Primary | ICD-10-CM

## 2017-11-03 DIAGNOSIS — R39.9 URINARY SYMPTOM OR SIGN: Primary | ICD-10-CM

## 2017-11-03 DIAGNOSIS — R30.0 DYSURIA: ICD-10-CM

## 2017-11-03 LAB
ALBUMIN UR-MCNC: NEGATIVE MG/DL
APPEARANCE UR: CLEAR
BILIRUB UR QL STRIP: NEGATIVE
COLOR UR AUTO: YELLOW
GLUCOSE UR STRIP-MCNC: NEGATIVE MG/DL
HGB UR QL STRIP: NEGATIVE
KETONES UR STRIP-MCNC: NEGATIVE MG/DL
LEUKOCYTE ESTERASE UR QL STRIP: NEGATIVE
NITRATE UR QL: NEGATIVE
PH UR STRIP: 6 PH (ref 5–7)
SOURCE: NORMAL
SP GR UR STRIP: <=1.005 (ref 1–1.03)
UROBILINOGEN UR STRIP-ACNC: 0.2 EU/DL (ref 0.2–1)

## 2017-11-03 PROCEDURE — 81003 URINALYSIS AUTO W/O SCOPE: CPT | Performed by: OBSTETRICS & GYNECOLOGY

## 2017-11-03 PROCEDURE — 99207 ZZC PRENATAL VISIT: CPT | Performed by: OBSTETRICS & GYNECOLOGY

## 2017-11-03 NOTE — PROGRESS NOTES
24w4d here for an acute visit for possible UTI.  Woke last night and went to pee, burned similar to when she has had UTIs in the past.  Better in the morning and throughout day, but wanted to make sure nothing is going on.  UA: negative.  Will monitor for now, if symptoms return, RTC for UA and wet prep.  Good fm.  Otherwise, RTC as scheduled.  roxanne

## 2017-11-03 NOTE — MR AVS SNAPSHOT
After Visit Summary   11/3/2017    Allyn Mcqueen    MRN: [id number]           Patient Information     Date Of Birth          1980        Visit Information        Provider Department      11/3/2017 4:00 PM Mireille Figueroa MD [facility]        Today's Diagnoses     Continuing pregnancy after intrauterine death of one twin with intrauterine retention    -  1    Dysuria           Follow-ups after your visit        Your next 10 appointments already scheduled     Nov 16, 2017  9:00 AM CST   LAB with RD LAB   [facility] ([facility])    [address] [place] [id number] [phone]           Please do not eat 10-12 hours before your appointment if you are coming in fasting for labs on lipids, cholesterol, or glucose (sugar). This does not apply to pregnant women. Water, hot tea and black coffee (with nothing added) are okay. Do not drink other fluids, diet soda or chew gum.              Who to contact     If you have questions or need follow up information about today's clinic visit or your schedule please contact [facility] directly at [phone].  Normal or non-critical lab and imaging results will be communicated to you by Joyridehart, letter or phone within 4 business days after the clinic has received the results. If you do not hear from us within 7 days, please contact the clinic through MCH+t or phone. If you have a critical or abnormal lab result, we will notify you by phone as soon as possible.  Submit refill requests through Tuneenergy or call your pharmacy and they will forward the refill request to us. Please allow 3 business days for your refill to be completed.          Additional Information About Your Visit        Joyridehart Information     Tuneenergy gives you secure access to your electronic health record. If you see a primary care provider, you can also send messages to your care team and make  "appointments. If you have questions, please call your primary care clinic.  If you do not have a primary care provider, please call [phone] and they will assist you.        Care EveryWhere ID     This is your Care EveryWhere ID. This could be used by other organizations to access your [facility] medical records  [id number]        Your Vitals Were     Pulse Temperature Height Last Period BMI (Body Mass Index)       87 97.6  F (36.4  C) (Oral) 5' 4\" (1.626 m) 05/15/2017 27.22 kg/m2        Blood Pressure from Last 3 Encounters:   11/03/17 108/76   10/30/17 122/68   09/29/17 113/73    Weight from Last 3 Encounters:   11/03/17 158 lb 9.6 oz (71.9 kg)   10/30/17 158 lb 9.6 oz (71.9 kg)   09/29/17 148 lb 12.8 oz (67.5 kg)              We Performed the Following     UA reflex to Microscopic and Culture        Primary Care Provider Office Phone # Fax #    Noelle Lundy [phone] [address] [facility] [address]        Equal Access to Services     [facility] : Hadii shwetha hung hadasho Sojesusali, waaxda luqadaha, qaybta kaalmada adesoledadyada, yuliya wilson. So [facility] [phone].    ATENCIÓN: Si habla español, tiene a ferrer disposición servicios gratuitos de asistencia lingüística. Spring al [phone].    We comply with applicable federal civil rights laws and [place] laws. We do not discriminate on the basis of race, color, national origin, age, disability, sex, sexual orientation, or gender identity.            Thank you!     Thank you for choosing [facility]  for your care. Our goal is always to provide you with excellent care. Hearing back from our patients is one way we can continue to improve our services. Please take a few minutes to complete the written survey that you may receive in the mail after your visit with us. Thank you!             Your Updated Medication List - Protect others around you: Learn how to safely use, store and throw " away your medicines at www.disposemymeds.org.          This list is accurate as of: 11/3/17  4:52 PM.  Always use your most recent med list.                   Brand Name Dispense Instructions for use Diagnosis    prenatal multivitamin plus iron 27-0.8 MG Tabs per tablet      Take 1 tablet by mouth daily Reported on 3/9/2017

## 2017-11-16 DIAGNOSIS — O09.522 AMA (ADVANCED MATERNAL AGE) MULTIGRAVIDA 35+, SECOND TRIMESTER: Primary | ICD-10-CM

## 2017-11-16 PROCEDURE — 82951 GLUCOSE TOLERANCE TEST (GTT): CPT | Performed by: OBSTETRICS & GYNECOLOGY

## 2017-11-16 PROCEDURE — 36415 COLL VENOUS BLD VENIPUNCTURE: CPT | Performed by: OBSTETRICS & GYNECOLOGY

## 2017-11-16 PROCEDURE — 82952 GTT-ADDED SAMPLES: CPT | Performed by: OBSTETRICS & GYNECOLOGY

## 2017-11-17 LAB
GLUCOSE 1H P 100 G GLC PO SERPL-MCNC: 145 MG/DL (ref 60–179)
GLUCOSE 2H P 100 G GLC PO SERPL-MCNC: 134 MG/DL (ref 60–154)
GLUCOSE 3H P 100 G GLC PO SERPL-MCNC: 95 MG/DL (ref 60–139)
GLUCOSE P FAST SERPL-MCNC: 77 MG/DL (ref 60–94)

## 2017-11-27 ENCOUNTER — PRENATAL OFFICE VISIT (OUTPATIENT)
Dept: OBGYN | Facility: CLINIC | Age: 37
End: 2017-11-27
Payer: COMMERCIAL

## 2017-11-27 VITALS
DIASTOLIC BLOOD PRESSURE: 64 MMHG | HEART RATE: 82 BPM | WEIGHT: 163.9 LBS | HEIGHT: 64 IN | SYSTOLIC BLOOD PRESSURE: 101 MMHG | BODY MASS INDEX: 27.98 KG/M2 | TEMPERATURE: 98 F

## 2017-11-27 DIAGNOSIS — O09.522 AMA (ADVANCED MATERNAL AGE) MULTIGRAVIDA 35+, SECOND TRIMESTER: Primary | ICD-10-CM

## 2017-11-27 PROCEDURE — 90715 TDAP VACCINE 7 YRS/> IM: CPT | Performed by: OBSTETRICS & GYNECOLOGY

## 2017-11-27 PROCEDURE — 99207 ZZC PRENATAL VISIT: CPT | Performed by: OBSTETRICS & GYNECOLOGY

## 2017-11-27 PROCEDURE — 90471 IMMUNIZATION ADMIN: CPT | Performed by: OBSTETRICS & GYNECOLOGY

## 2017-11-27 NOTE — MR AVS SNAPSHOT
"              After Visit Summary   11/27/2017    Allyn Mcqueen    MRN: [id number]           Patient Information     Date Of Birth          1980        Visit Information        Provider Department      11/27/2017 10:30 AM Sally Henderson [place] [facility] [place]        Today's Diagnoses     AMA (advanced maternal age) multigravida 35+, second trimester    -  1       Follow-ups after your visit        Who to contact     If you have questions or need follow up information about today's clinic visit or your schedule please contact [facility] [place] directly at [phone].  Normal or non-critical lab and imaging results will be communicated to you by TenderTreehart, letter or phone within 4 business days after the clinic has received the results. If you do not hear from us within 7 days, please contact the clinic through "[employer]t or phone. If you have a critical or abnormal lab result, we will notify you by phone as soon as possible.  Submit refill requests through Subtech or call your pharmacy and they will forward the refill request to us. Please allow 3 business days for your refill to be completed.          Additional Information About Your Visit        MyChart Information     Subtech gives you secure access to your electronic health record. If you see a primary care provider, you can also send messages to your care team and make appointments. If you have questions, please call your primary care clinic.  If you do not have a primary care provider, please call [phone] and they will assist you.        Care EveryWhere ID     This is your Care EveryWhere ID. This could be used by other organizations to access your [place] medical records  [id number]        Your Vitals Were     Pulse Temperature Height Last Period BMI (Body Mass Index)       82 98  F (36.7  C) (Oral) 5' 4\" (1.626 m) 05/15/2017 28.13 kg/m2        Blood Pressure from Last 3 Encounters:   11/27/17 101/64   11/03/17 108/76   10/30/17 " 122/68    Weight from Last 3 Encounters:   11/27/17 163 lb 14.4 oz (74.3 kg)   11/03/17 158 lb 9.6 oz (71.9 kg)   10/30/17 158 lb 9.6 oz (71.9 kg)              We Performed the Following     TDAP VACCINE (ADACEL)        Primary Care Provider Office Phone # Fax #    Noelle Lundy [phone]       [address]  [facility] [address]        Equal Access to Services     JALIL PERKINS : Hadii aad ku hadasho Soomaali, waaxda luqadaha, qaybta kaalmada adeegyada, waxay idiin hayaan adesoledad cruz . So [facility] [phone].    ATENCIÓN: Si habla español, tiene a ferrer disposición servicios gratuitos de asistencia lingüística. [facility] [phone].    We comply with applicable federal civil rights laws and [place] laws. We do not discriminate on the basis of race, color, national origin, age, disability, sex, sexual orientation, or gender identity.            Thank you!     Thank you for choosing [facility]  for your care. Our goal is always to provide you with excellent care. Hearing back from our patients is one way we can continue to improve our services. Please take a few minutes to complete the written survey that you may receive in the mail after your visit with us. Thank you!             Your Updated Medication List - Protect others around you: Learn how to safely use, store and throw away your medicines at www.disposemymeds.org.          This list is accurate as of: 11/27/17 11:28 AM.  Always use your most recent med list.                   Brand Name Dispense Instructions for use Diagnosis    prenatal multivitamin plus iron 27-0.8 MG Tabs per tablet      Take 1 tablet by mouth daily Reported on 3/9/2017

## 2017-11-27 NOTE — NURSING NOTE
"Chief Complaint   Patient presents with     Prenatal Care     28+0       Initial /64  Pulse 82  Temp 98  F (36.7  C) (Oral)  Ht 5' 4\" (1.626 m)  Wt 163 lb 14.4 oz (74.3 kg)  LMP 05/15/2017  BMI 28.13 kg/m2 Estimated body mass index is 28.13 kg/(m^2) as calculated from the following:    Height as of this encounter: 5' 4\" (1.626 m).    Weight as of this encounter: 163 lb 14.4 oz (74.3 kg).  BP completed using cuff size: regular        The following HM Due: NONE      The following patient reported/Care Every where data was sent to:  P ABSTRACT QUALITY INITIATIVES [72195]  NA      patient has appointment for today    Su WORLEY                "

## 2017-12-12 ENCOUNTER — PRENATAL OFFICE VISIT (OUTPATIENT)
Dept: OBGYN | Facility: CLINIC | Age: 37
End: 2017-12-12
Payer: COMMERCIAL

## 2017-12-12 VITALS
WEIGHT: 167 LBS | BODY MASS INDEX: 28.67 KG/M2 | TEMPERATURE: 96.4 F | DIASTOLIC BLOOD PRESSURE: 60 MMHG | HEART RATE: 95 BPM | SYSTOLIC BLOOD PRESSURE: 88 MMHG

## 2017-12-12 DIAGNOSIS — O09.522 AMA (ADVANCED MATERNAL AGE) MULTIGRAVIDA 35+, SECOND TRIMESTER: ICD-10-CM

## 2017-12-12 DIAGNOSIS — O99.891 PAIN IN SYMPHYSIS PUBIS DURING PREGNANCY: Primary | ICD-10-CM

## 2017-12-12 DIAGNOSIS — M79.18 PAIN IN SYMPHYSIS PUBIS DURING PREGNANCY: Primary | ICD-10-CM

## 2017-12-12 DIAGNOSIS — N89.8 VAGINAL DISCHARGE DURING PREGNANCY IN THIRD TRIMESTER: ICD-10-CM

## 2017-12-12 DIAGNOSIS — O26.893 VAGINAL DISCHARGE DURING PREGNANCY IN THIRD TRIMESTER: ICD-10-CM

## 2017-12-12 LAB — A1 MICROGLOB PLACENTAL VAG QL: NEGATIVE

## 2017-12-12 PROCEDURE — 99207 ZZC PRENATAL VISIT: CPT | Performed by: OBSTETRICS & GYNECOLOGY

## 2017-12-12 PROCEDURE — 84112 EVAL AMNIOTIC FLUID PROTEIN: CPT | Performed by: OBSTETRICS & GYNECOLOGY

## 2017-12-12 NOTE — MR AVS SNAPSHOT
After Visit Summary   12/12/2017    Allyn Mcqueen    MRN: [id number]           Patient Information     Date Of Birth          1980        Visit Information        Provider Department      12/12/2017 9:00 AM Katina Sommers MD [facility] [place]        Today's Diagnoses     Pain in symphysis pubis during pregnancy    -  1    Vaginal discharge during pregnancy in third trimester        AMA (advanced maternal age) multigravida 35+, second trimester           Follow-ups after your visit        Additional Services     [facility] PT, HAND, AND CHIROPRACTIC REFERRAL       **This order will print in the [facility] Scheduling Office**    Physical Therapy, Hand Therapy and Chiropractic Care are available through:    *[facility]  *[facility]  *[facility]    Call one number to schedule at any of the above locations: [phone].    Your provider has referred you to: Physical Therapy at [facility] or [facility]    Indication/Reason for Referral: Women's Health (Please Complete Special Programs SmartList)  Onset of Illness: few days. Patient is pregnant. 30w1d Estimated Date of Delivery: Feb 19, 2018   Therapy Orders: Evaluate and Treat  Special Programs: Women's Health: OB/GYN Musculoskeletal Dysfunction: LBP/Sacral Pain, Pregnancy: 30w1d  Weeks Gestation and Pubic Pain and  Fit for a brace?  Special Request: None  none    Jennifer Rodriguez      Additional Comments for the Therapist or Chiropractor:     Please be aware that coverage of these services is subject to the terms and limitations of your health insurance plan.  Call member services at your health plan with any benefit or coverage questions.      Please bring the following to your appointment:    *Your personal calendar for scheduling future appointments  *Comfortable clothing                  Follow-up notes from your care team     Return in about 2 weeks (around 12/26/2017).      Your next 10  appointments already scheduled     Dec 22, 2017 10:45 AM CST   [facility] Established Prenatal with Mireille Figueroa MD   [facility] ([facility])    [address] [place] [address] [phone]              Who to contact     If you have questions or need follow up information about today's clinic visit or your schedule please contact [facility] directly at [phone].  Normal or non-critical lab and imaging results will be communicated to you by Cash4Goldhart, letter or phone within 4 business days after the clinic has received the results. If you do not hear from us within 7 days, please contact the clinic through Ordorot or phone. If you have a critical or abnormal lab result, we will notify you by phone as soon as possible.  Submit refill requests through JumpPost or call your pharmacy and they will forward the refill request to us. Please allow 3 business days for your refill to be completed.          Additional Information About Your Visit        Cash4Goldhart Information     JumpPost gives you secure access to your electronic health record. If you see a primary care provider, you can also send messages to your care team and make appointments. If you have questions, please call your primary care clinic.  If you do not have a primary care provider, please call [phone] and they will assist you.        Care EveryWhere ID     This is your Care EveryWhere ID. This could be used by other organizations to access your [place] medical records  [id number]        Your Vitals Were     Pulse Temperature Last Period BMI (Body Mass Index)          95 96.4  F (35.8  C) (Oral) 05/15/2017 28.67 kg/m2         Blood Pressure from Last 3 Encounters:   12/12/17 (!) 88/60   11/27/17 101/64   11/03/17 108/76    Weight from Last 3 Encounters:   12/12/17 167 lb (75.8 kg)   11/27/17 163 lb 14.4 oz (74.3 kg)   11/03/17 158 lb 9.6 oz (71.9 kg)              We  Performed the Following     [facility] PT, HAND, AND CHIROPRACTIC REFERRAL     Rupture of membranes by Amnisure        Primary Care Provider Office Phone # Fax #    Noelle Lundy [phone] [address] [facility] [address]        Equal Access to Services     JALIL PERKINS : Hadii aad ku hadasho Soomaali, waaxda luqadaha, qaybta kaalmada adeegyada, waxay darrelin haylindsey wilson. So [facility] [phone].    ATENCIÓN: Si habla español, tiene a ferrer disposición servicios gratuitos de asistencia lingüística. Ll[facility] [phone].    We comply with applicable federal civil rights laws and [place] laws. We do not discriminate on the basis of race, color, national origin, age, disability, sex, sexual orientation, or gender identity.            Thank you!     Thank you for choosing [facility]  for your care. Our goal is always to provide you with excellent care. Hearing back from our patients is one way we can continue to improve our services. Please take a few minutes to complete the written survey that you may receive in the mail after your visit with us. Thank you!             Your Updated Medication List - Protect others around you: Learn how to safely use, store and throw away your medicines at www.disposemymeds.org.          This list is accurate as of: 12/12/17  4:35 PM.  Always use your most recent med list.                   Brand Name Dispense Instructions for use Diagnosis    prenatal multivitamin plus iron 27-0.8 MG Tabs per tablet      Take 1 tablet by mouth daily Reported on 3/9/2017

## 2017-12-12 NOTE — PROGRESS NOTES
30w1d  Increased discharge, more with change of position, thinks it is probably urine because maybe smells like urine but is not sure. No bleeding. No cramping or contractions. No dysuria. Active fetal movement. Increased pelvic pain and pressure. Some constipation. Encouraged more fluids, fiber, discussed miralax.   Amnisure: neg. No signs of rupture on exam.   PT referral discussed and ordered.     RTC 2 weeks.  Katina Sommers MD

## 2017-12-19 ENCOUNTER — THERAPY VISIT (OUTPATIENT)
Dept: PHYSICAL THERAPY | Facility: CLINIC | Age: 37
End: 2017-12-19
Payer: COMMERCIAL

## 2017-12-19 DIAGNOSIS — M25.359 INSTABILITY OF PELVIS OR THIGH JOINT: Primary | ICD-10-CM

## 2017-12-19 DIAGNOSIS — M54.9 BACK PAIN: ICD-10-CM

## 2017-12-19 PROCEDURE — 97110 THERAPEUTIC EXERCISES: CPT | Mod: GP | Performed by: PHYSICAL THERAPIST

## 2017-12-19 PROCEDURE — 97161 PT EVAL LOW COMPLEX 20 MIN: CPT | Mod: GP | Performed by: PHYSICAL THERAPIST

## 2017-12-19 NOTE — PROGRESS NOTES
Physical Therapy Initial Examination/Evaluation  December 19, 2017    Allyn Mcqueen is a 37 year old female referred to physical therapy by Katina Sommers MD for treatment of pubic pain during pregnancy with Precautions/Restrictions/MD instructions to evaluate and treat, question fit with support belt      Subjective:  DOI/onset: 1.5 week  DOS: None  Acute Injury or Gradual Onset?: Gradual over a couple of days, got worse after yoga  Mechanism of Injury: Unsure  Related PMH: Pregnancy Previous Treatment: Chiro, improved, got an abdominal corset which helped with low back pain during walking Effect of prior treatment: Helpful, much better  Imaging: None  Chief Complaint/Functional Limitations:   Walking  More than a few blocks now causes some symptoms, not severe (previously could hardly walk), stairs, bed mobility were extremely painful and difficult (all better in past few days), still having some pain with these activities, just not severe, and see below in therapy evaluation codes   Pain: rest 0-2 /10, activity 2 today/10 Location: Today lower back especially on L, for the most part has been more in the inguinal area especially on R, when lifting knee Frequency: Constant Described as: Now uncomfortable, feels loose ,previously felt sharp  Alleviated by: Tylenol and chiro Progression of Symptoms: Improved Time of day when pain is worse: Evening  Sleeping: No issues/uninterrupted   Occupation:   Job duties: prolonged sitting, keyboarding/computer use, 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  Current HEP/exercise regimen:   Yoga, a lot of walking, has avoided since last week  Patient's goals are see chief complaints, return to exercise, be able to do all activity without pain    Other pertinent PMH/Red Flags: none   Barriers at home/work: 2 year old,  is helpful  Pertinent Surgical History: None  Medications: None as reported by patient  General health as reported by patient: excellent  Return to  MD:  1 week    [facility] Evaluation  Subjective:    HPI                    Objective:    Observation:  Advanced pregnancy, rises from chair slightly slowly, independent with bed transitions, gait slightly broad based but not antalgic today    System         Lumbar/SI Evaluation  ROM:    AROM Lumbar:   Flexion:          50%, slightl pain returning  Ext:                    50%   Side Bend:        Left:  50%    Right:  50% tight on L side  Rotation:           Left:     Right:   Side Glide:        Left:     Right:           Lumbar Myotomes:  not assessed            Lumbar DTR's:  not assessed          Neural Tension/Mobility:  Lumbar:  Normal                SI joint/Sacrum:    Ilium ventromedial on L felt good    Left positive at:    Forward bend standing; Squish and Thigh thrust  Right positive at:    Squish and Ilium Ventromedial  Sacral conclusion left:  Hyper  Sacral conclusion right:  Hyper                                             General     ROS    Assessment/Plan:      Patient is a 37 year old female with low back/sacrum complaints.    Patient has the following significant findings with corresponding treatment plan.                Diagnosis 1:  Back and pelvis pain during pregnancy    Pain -  hot/cold therapy, manual therapy, splint/taping/bracing/orthotics, self management, education and home program  Decreased strength - therapeutic exercise and therapeutic activities  Instability -  Therapeutic Activity  Therapeutic Exercise  Splinting/Taping/Bracing/Orthotic    Therapy Evaluation Codes:   1) History comprised of:   Personal factors that impact the plan of care:      None.    Comorbidity factors that impact the plan of care are:      Current pregnancy.     Medications impacting care: None.  2) Examination of Body Systems comprised of:   Body structures and functions that impact the plan of care:      Pelvis and Sacral illiac joint.   Activity limitations that impact the plan of care  are:      Lifting, Squatting/kneeling, Stairs, Standing and Walking.  3) Clinical presentation characteristics are:   Stable/Uncomplicated.  4) Decision-Making    Low complexity using standardized patient assessment instrument and/or measureable assessment of functional outcome.  Cumulative Therapy Evaluation is: Low complexity.    Previous and current functional limitations:  (See Goal Flow Sheet for this information)    Short term and Long term goals: (See Goal Flow Sheet for this information)     Communication ability:  Patient appears to be able to clearly communicate and understand verbal and written communication and follow directions correctly.  Treatment Explanation - The following has been discussed with the patient:   RX ordered/plan of care  Anticipated outcomes  Possible risks and side effects  This patient would benefit from PT intervention to resume normal activities.   Rehab potential is good.    Frequency:  1 X week, once daily  Duration:  for 4-6 weeks  Discharge Plan:  Achieve all LTG.  Independent in home treatment program.  Reach maximal therapeutic benefit.    Please refer to the daily flowsheet for treatment today, total treatment time and time spent performing 1:1 timed codes.

## 2017-12-19 NOTE — MR AVS SNAPSHOT
After Visit Summary   12/19/2017    Allyn Mcqueen    MRN: [id number]           Patient Information     Date Of Birth          1980        Visit Information        Provider Department      12/19/2017 8:20 AM Dior Painter PT [facility] [place]        Today's Diagnoses     Instability of pelvis or thigh joint    -  1    Back pain           Follow-ups after your visit        Your next 10 appointments already scheduled     Dec 22, 2017 10:45 AM CST   [facility] Established Prenatal with Mireille Figueroa MD   [facility]    [address] [place] [address]  [address]  [facility] [address] [phone]            Dec 27, 2017 11:30 AM CST   [facility] For Women Only with Dior Painter PT   [facility] [place] ( [facility])    [address] [place] [address] [address]  [facility] [address] [phone]              Who to contact     If you have questions or need follow up information about today's clinic visit or your schedule please contact [facility] directly at [phone].  Normal or non-critical lab and imaging results will be communicated to you by Monthlyshart, letter or phone within 4 business days after the clinic has received the results. If you do not hear from us within 7 days, please contact the clinic through Monthlyshart or phone. If you have a critical or abnormal lab result, we will notify you by phone as soon as possible.  Submit refill requests through QuickoLabs or call your pharmacy and they will forward the refill request to us. Please allow 3 business days for your refill to be completed.          Additional Information About Your Visit        MyChart Information     [employer] gives you secure access to your electronic health record. If you see a primary care provider, you can also send messages to your care team and make appointments. If you  have questions, please call your primary care clinic.  If you do not have a primary care provider, please call [phone] and they will assist you.        Care EveryWhere ID     This is your Care EveryWhere ID. This could be used by other organizations to access your [place] medical records  [id number]        Your Vitals Were     Last Period                   05/15/2017            Blood Pressure from Last 3 Encounters:   12/12/17 (!) 88/60   11/27/17 101/64   11/03/17 108/76    Weight from Last 3 Encounters:   12/12/17 75.8 kg (167 lb)   11/27/17 74.3 kg (163 lb 14.4 oz)   11/03/17 71.9 kg (158 lb 9.6 oz)              We Performed the Following     [facility] Inital Eval Report     PT Eval, Low Complexity (50172)     Therapeutic Exercises        Primary Care Provider Office Phone # Fax #    Noelle Lundy [phone] [address] [facility] [address]        Equal Access to Services     JALIL PERKINS : Hadii aad ku hadasho Soomaali, waaxda luqadaha, qaybta kaalmada adeegyada, waxay idiin haythelman hermelinda cruz . So [facility] [phone].    ATENCIÓN: Si habla español, tiene a ferrer disposición servicios gratuitos de asistencia lingüística. Llame al [phone].    We comply with applicable federal civil rights laws and [place] laws. We do not discriminate on the basis of race, color, national origin, age, disability, sex, sexual orientation, or gender identity.            Thank you!     [facility] [place]  for your care. Our goal is always to provide you with excellent care. Hearing back from our patients is one way we can continue to improve our services. Please take a few minutes to complete the written survey that you may receive in the mail after your visit with us. Thank you!             Your Updated Medication List - Protect others around you: Learn how to safely use, store and throw away your medicines at  www.disposemymeds.org.          This list is accurate as of: 12/19/17  1:40 PM.  Always use your most recent med list.                   Brand Name Dispense Instructions for use Diagnosis    prenatal multivitamin plus iron 27-0.8 MG Tabs per tablet      Take 1 tablet by mouth daily Reported on 3/9/2017

## 2017-12-22 ENCOUNTER — PRENATAL OFFICE VISIT (OUTPATIENT)
Dept: OBGYN | Facility: CLINIC | Age: 37
End: 2017-12-22
Payer: COMMERCIAL

## 2017-12-22 VITALS
DIASTOLIC BLOOD PRESSURE: 73 MMHG | HEART RATE: 120 BPM | WEIGHT: 169.2 LBS | SYSTOLIC BLOOD PRESSURE: 134 MMHG | BODY MASS INDEX: 29.04 KG/M2 | TEMPERATURE: 98.4 F

## 2017-12-22 DIAGNOSIS — O31.20X0 CONTINUING PREGNANCY AFTER INTRAUTERINE DEATH OF ONE TWIN WITH INTRAUTERINE RETENTION: Primary | ICD-10-CM

## 2017-12-22 PROCEDURE — 99207 ZZC PRENATAL VISIT: CPT | Performed by: OBSTETRICS & GYNECOLOGY

## 2017-12-22 NOTE — PROGRESS NOTES
31w4d feeling ok, no c/o.  Mild constipation, discussed.  Occasional dizzy, discussed.  Had really bad pelvic pain, much better after chiropractor and PT.  Good fm.  RTC 2 weeks  jlp

## 2017-12-22 NOTE — MR AVS SNAPSHOT
After Visit Summary   12/22/2017    Allyn Mcqueen    MRN: [id number]           Patient Information     Date Of Birth          1980        Visit Information        Provider Department      12/22/2017 10:45 AM Mireille Figueroa MD [facility] [place]        Today's Diagnoses     Continuing pregnancy after intrauterine death of one twin with intrauterine retention    -  1       Follow-ups after your visit        Your next 10 appointments already scheduled     Dec 27, 2017 11:30 AM CST   SETH For Women Only with Dior Painter PT   [facility]    [address] [place] [address] [phone]              Who to contact     If you have questions or need follow up information about today's clinic visit or your schedule please contact [facility] directly at [phone].  Normal or non-critical lab and imaging results will be communicated to you by Pawaa Softwarehart, letter or phone within 4 business days after the clinic has received the results. If you do not hear from us within 7 days, please contact the clinic through Pawaa Softwarehart or phone. If you have a critical or abnormal lab result, we will notify you by phone as soon as possible.  Submit refill requests through ZQGame or call your pharmacy and they will forward the refill request to us. Please allow 3 business days for your refill to be completed.          Additional Information About Your Visit        MyChart Information     ZQGame gives you secure access to your electronic health record. If you see a primary care provider, you can also send messages to your care team and make appointments. If you have questions, please call your primary care clinic.  If you do not have a primary care provider, please call [phone] and they will assist you.        Care EveryWhere ID     This is your Care EveryWhere ID. This could be used by other  organizations to access your [place] medical records  [id number]        Your Vitals Were     Pulse Temperature Last Period BMI (Body Mass Index)          120 98.4  F (36.9  C) (Oral) 05/15/2017 29.04 kg/m2         Blood Pressure from Last 3 Encounters:   12/22/17 134/73   12/12/17 (!) 88/60   11/27/17 101/64    Weight from Last 3 Encounters:   12/22/17 169 lb 3.2 oz (76.7 kg)   12/12/17 167 lb (75.8 kg)   11/27/17 163 lb 14.4 oz (74.3 kg)              Today, you had the following     No orders found for display       Primary Care Provider Office Phone # Fax #    Noelle Lundy [phone] [address]  [facility] [address]        Equal Access to Services     [facility] : Hadii shwetha simpson Sokristina, waaxda luqadaha, qaybta kaalmada adesoledadyamaureen, yuliya cruz . So [facility] [phone].    ATENCIÓN: Si habla español, tiene a ferrer disposición servicios gratuitos de asistencia lingüística. Llame al [phone].    We comply with applicable federal civil rights laws and [place] laws. We do not discriminate on the basis of race, color, national origin, age, disability, sex, sexual orientation, or gender identity.            Thank you!     Thank you for choosing [facility]  for your care. Our goal is always to provide you with excellent care. Hearing back from our patients is one way we can continue to improve our services. Please take a few minutes to complete the written survey that you may receive in the mail after your visit with us. Thank you!             Your Updated Medication List - Protect others around you: Learn how to safely use, store and throw away your medicines at www.disposemymeds.org.          This list is accurate as of: 12/22/17 11:07 AM.  Always use your most recent med list.                   Brand Name Dispense Instructions for use Diagnosis    prenatal multivitamin plus iron 27-0.8 MG Tabs per tablet      Take 1 tablet  by mouth daily Reported on 3/9/2017

## 2018-01-05 ENCOUNTER — PRENATAL OFFICE VISIT (OUTPATIENT)
Dept: OBGYN | Facility: CLINIC | Age: 38
End: 2018-01-05
Payer: COMMERCIAL

## 2018-01-05 VITALS
HEART RATE: 83 BPM | SYSTOLIC BLOOD PRESSURE: 107 MMHG | BODY MASS INDEX: 29.95 KG/M2 | WEIGHT: 174.5 LBS | DIASTOLIC BLOOD PRESSURE: 71 MMHG

## 2018-01-05 DIAGNOSIS — O31.20X0 CONTINUING PREGNANCY AFTER INTRAUTERINE DEATH OF ONE TWIN WITH INTRAUTERINE RETENTION: Primary | ICD-10-CM

## 2018-01-05 PROCEDURE — 99207 ZZC PRENATAL VISIT: CPT | Performed by: OBSTETRICS & GYNECOLOGY

## 2018-01-05 NOTE — MR AVS SNAPSHOT
After Visit Summary   1/5/2018    Allyn Mcqueen    MRN: [id number]           Patient Information     Date Of Birth          1980        Visit Information        Provider Department      1/5/2018 4:00 PM Mireille Figueroa MD [facility]        Today's Diagnoses     Continuing pregnancy after intrauterine death of one twin with intrauterine retention    -  1       Follow-ups after your visit        Who to contact     If you have questions or need follow up information about today's clinic visit or your schedule please contact [facility] directly at [phone].  Normal or non-critical lab and imaging results will be communicated to you by Yunzhishenghart, letter or phone within 4 business days after the clinic has received the results. If you do not hear from us within 7 days, please contact the clinic through "[employer]t or phone. If you have a critical or abnormal lab result, we will notify you by phone as soon as possible.  Submit refill requests through Alc Holdings or call your pharmacy and they will forward the refill request to us. Please allow 3 business days for your refill to be completed.          Additional Information About Your Visit        MyChart Information     [employer] gives you secure access to your electronic health record. If you see a primary care provider, you can also send messages to your care team and make appointments. If you have questions, please call your primary care clinic.  If you do not have a primary care provider, please call [phone] and they will assist you.        Care EveryWhere ID     This is your Care EveryWhere ID. This could be used by other organizations to access your [place] medical records  [id number]        Your Vitals Were     Pulse Last Period BMI (Body Mass Index)             83 05/15/2017 29.95 kg/m2          Blood Pressure from Last 3 Encounters:   01/05/18 107/71   12/22/17 134/73   12/12/17 (!) 88/60    Weight from Last  3 Encounters:   01/05/18 174 lb 8 oz (79.2 kg)   12/22/17 169 lb 3.2 oz (76.7 kg)   12/12/17 167 lb (75.8 kg)              Today, you had the following     No orders found for display       Primary Care Provider Office Phone # Fax #    Noelle Lundy [phone]       [address]  [facility] [address]        Equal Access to Services     JALIL PERKINS : Hadii aad ku hadasho Soomaali, waaxda luqadaha, qaybta kaalmada adeegyada, waxay idiin hayaan adeeg kharamaria fernanda la'thelman . So [facility] [phone].    ATENCIÓN: Si habla español, tiene a ferrer disposición servicios gratuitos de asistencia lingüística. [facility] [phone].    We comply with applicable federal civil rights laws and [place] laws. We do not discriminate on the basis of race, color, national origin, age, disability, sex, sexual orientation, or gender identity.            Thank you!     Thank you for choosing [facility]  for your care. Our goal is always to provide you with excellent care. Hearing back from our patients is one way we can continue to improve our services. Please take a few minutes to complete the written survey that you may receive in the mail after your visit with us. Thank you!             Your Updated Medication List - Protect others around you: Learn how to safely use, store and throw away your medicines at www.disposemymeds.org.          This list is accurate as of: 1/5/18  4:13 PM.  Always use your most recent med list.                   Brand Name Dispense Instructions for use Diagnosis    prenatal multivitamin plus iron 27-0.8 MG Tabs per tablet      Take 1 tablet by mouth daily Reported on 3/9/2017

## 2018-01-05 NOTE — PROGRESS NOTES
33w4d Feeling good, no c/o.  Good fm.  Some hemorrhoids, not too bad, discussed.  RTC 2 weeks  barbp

## 2018-01-22 ENCOUNTER — PRENATAL OFFICE VISIT (OUTPATIENT)
Dept: OBGYN | Facility: CLINIC | Age: 38
End: 2018-01-22
Payer: COMMERCIAL

## 2018-01-22 VITALS
HEART RATE: 89 BPM | BODY MASS INDEX: 30.19 KG/M2 | SYSTOLIC BLOOD PRESSURE: 116 MMHG | DIASTOLIC BLOOD PRESSURE: 66 MMHG | WEIGHT: 175.9 LBS

## 2018-01-22 DIAGNOSIS — O09.522 AMA (ADVANCED MATERNAL AGE) MULTIGRAVIDA 35+, SECOND TRIMESTER: Primary | ICD-10-CM

## 2018-01-22 LAB — HGB BLD-MCNC: 11.6 G/DL (ref 11.7–15.7)

## 2018-01-22 PROCEDURE — 87653 STREP B DNA AMP PROBE: CPT | Performed by: OBSTETRICS & GYNECOLOGY

## 2018-01-22 PROCEDURE — 00000218 ZZHCL STATISTIC OBHBG - HEMOGLOBIN: Performed by: OBSTETRICS & GYNECOLOGY

## 2018-01-22 PROCEDURE — 36416 COLLJ CAPILLARY BLOOD SPEC: CPT | Performed by: OBSTETRICS & GYNECOLOGY

## 2018-01-22 PROCEDURE — 99207 ZZC PRENATAL VISIT: CPT | Performed by: OBSTETRICS & GYNECOLOGY

## 2018-01-22 ASSESSMENT — PATIENT HEALTH QUESTIONNAIRE - PHQ9: SUM OF ALL RESPONSES TO PHQ QUESTIONS 1-9: 2

## 2018-01-22 NOTE — PROGRESS NOTES
36w0d feeling ok, no c/o.  Getting more uncomfortable and periodic ctx.  Lots of mvmt.  Discussed flu restrictions. GBS today.  RTC weekly  roxanne

## 2018-01-22 NOTE — MR AVS SNAPSHOT
After Visit Summary   1/22/2018    Allyn Mcqueen    MRN: [id number]           Patient Information     Date Of Birth          1980        Visit Information        Provider Department      1/22/2018 11:30 AM Mireille Figueroa MD [facility]        Today's Diagnoses     AMA (advanced maternal age) multigravida 35+, second trimester    -  1       Follow-ups after your visit        Who to contact     If you have questions or need follow up information about today's clinic visit or your schedule please contact [facility] directly at [phone].  Normal or non-critical lab and imaging results will be communicated to you by Biomotihart, letter or phone within 4 business days after the clinic has received the results. If you do not hear from us within 7 days, please contact the clinic through Mammotomet or phone. If you have a critical or abnormal lab result, we will notify you by phone as soon as possible.  Submit refill requests through Coupons Near Me or call your pharmacy and they will forward the refill request to us. Please allow 3 business days for your refill to be completed.          Additional Information About Your Visit        MyChart Information     Coupons Near Me gives you secure access to your electronic health record. If you see a primary care provider, you can also send messages to your care team and make appointments. If you have questions, please call your primary care clinic.  If you do not have a primary care provider, please call [phone] and they will assist you.        Care EveryWhere ID     This is your Care EveryWhere ID. This could be used by other organizations to access your [place] medical records  [id number]        Your Vitals Were     Pulse Last Period BMI (Body Mass Index)             89 05/15/2017 30.19 kg/m2          Blood Pressure from Last 3 Encounters:   01/22/18 116/66   01/05/18 107/71   12/22/17 134/73    Weight from Last 3 Encounters:    01/22/18 175 lb 14.4 oz (79.8 kg)   01/05/18 174 lb 8 oz (79.2 kg)   12/22/17 169 lb 3.2 oz (76.7 kg)              We Performed the Following     Group B strep PCR     OB hemoglobin        Primary Care Provider Office Phone # Fax #    Noelle Lundy [phone]       [address]  [facility] [address]        Equal Access to Services     JALIL PERKINS : Hadii aad ku hadasho Soomaali, waaxda luqadaha, qaybta kaalmada adeegyada, waxay idiin hayaan adeeg kharamaria fernanda la'thelman . So [facility] [phone].    ATENCIÓN: Si habla español, tiene a ferrer disposición servicios gratuitos de asistencia lingüística. [facility] [phone].    We comply with applicable federal civil rights laws and [place] laws. We do not discriminate on the basis of race, color, national origin, age, disability, sex, sexual orientation, or gender identity.            Thank you!     Thank you for choosing [facility]  for your care. Our goal is always to provide you with excellent care. Hearing back from our patients is one way we can continue to improve our services. Please take a few minutes to complete the written survey that you may receive in the mail after your visit with us. Thank you!             Your Updated Medication List - Protect others around you: Learn how to safely use, store and throw away your medicines at www.disposemymeds.org.          This list is accurate as of: 1/22/18 12:30 PM.  Always use your most recent med list.                   Brand Name Dispense Instructions for use Diagnosis    prenatal multivitamin plus iron 27-0.8 MG Tabs per tablet      Take 1 tablet by mouth daily Reported on 3/9/2017

## 2018-01-23 LAB
GP B STREP DNA SPEC QL NAA+PROBE: NEGATIVE
SPECIMEN SOURCE: NORMAL

## 2018-01-29 ENCOUNTER — PRENATAL OFFICE VISIT (OUTPATIENT)
Dept: OBGYN | Facility: CLINIC | Age: 38
End: 2018-01-29
Payer: COMMERCIAL

## 2018-01-29 VITALS
SYSTOLIC BLOOD PRESSURE: 102 MMHG | OXYGEN SATURATION: 98 % | DIASTOLIC BLOOD PRESSURE: 63 MMHG | WEIGHT: 177 LBS | BODY MASS INDEX: 30.38 KG/M2 | HEART RATE: 90 BPM

## 2018-01-29 DIAGNOSIS — Z34.82 ENCOUNTER FOR SUPERVISION OF OTHER NORMAL PREGNANCY IN SECOND TRIMESTER: Primary | ICD-10-CM

## 2018-01-29 PROCEDURE — 99207 ZZC PRENATAL VISIT: CPT | Performed by: OBSTETRICS & GYNECOLOGY

## 2018-01-29 NOTE — PROGRESS NOTES
No c/o. RTC weekly until delivery.  BE    GBS: Negative  Hemoglobin   Date Value Ref Range Status   01/22/2018 11.6 (L) 11.7 - 15.7 g/dL Final   ]    Breast pump rx: still has  Labor orders: signed and held  Birth plan: going to try without drugs  Length of stay: discussed  Disability paperwork: NA  Resident involvement: discussed and agrees.

## 2018-01-29 NOTE — MR AVS SNAPSHOT
After Visit Summary   1/29/2018    Allyn Mcqueen    MRN: [id number]           Patient Information     Date Of Birth          1980        Visit Information        Provider Department      1/29/2018 9:45 AM Meryl Strickland MD [facility]        Today's Diagnoses     Encounter for supervision of other normal pregnancy in second trimester    -  1       Follow-ups after your visit        Who to contact     If you have questions or need follow up information about today's clinic visit or your schedule please contact [facility] directly at [phone].  Normal or non-critical lab and imaging results will be communicated to you by Wakie/Budisthart, letter or phone within 4 business days after the clinic has received the results. If you do not hear from us within 7 days, please contact the clinic through Wakie/Budisthart or phone. If you have a critical or abnormal lab result, we will notify you by phone as soon as possible.  Submit refill requests through [employer] or call your pharmacy and they will forward the refill request to us. Please allow 3 business days for your refill to be completed.          Additional Information About Your Visit        MyChart Information     [employer] gives you secure access to your electronic health record. If you see a primary care provider, you can also send messages to your care team and make appointments. If you have questions, please call your primary care clinic.  If you do not have a primary care provider, please call [phone] and they will assist you.        Care EveryWhere ID     This is your Care EveryWhere ID. This could be used by other organizations to access your [place] medical records  [id number]        Your Vitals Were     Pulse Last Period Pulse Oximetry BMI (Body Mass Index)          90 05/15/2017 98% 30.38 kg/m2         Blood Pressure from Last 3 Encounters:   01/29/18 102/63   01/22/18 116/66   01/05/18 107/71    Weight from  Last 3 Encounters:   01/29/18 177 lb (80.3 kg)   01/22/18 175 lb 14.4 oz (79.8 kg)   01/05/18 174 lb 8 oz (79.2 kg)              Today, you had the following     No orders found for display       Primary Care Provider Office Phone # Fax #    Noelle Lundy [phone]       [address]  [facility] [address]        Equal Access to Services     JALIL PERKINS : Hadii aad ku hadasho Soomaali, waaxda luqadaha, qaybta kaalmada adeegyada, waxay idiin hayaan adeeg kharash la'thelman . So [facility] [phone].    ATENCIÓN: Si habla español, tiene a ferrer disposición servicios gratuitos de asistencia lingüística. [facility] [phone].    We comply with applicable federal civil rights laws and [place] laws. We do not discriminate on the basis of race, color, national origin, age, disability, sex, sexual orientation, or gender identity.            Thank you!     Thank you for choosing [facility]  for your care. Our goal is always to provide you with excellent care. Hearing back from our patients is one way we can continue to improve our services. Please take a few minutes to complete the written survey that you may receive in the mail after your visit with us. Thank you!             Your Updated Medication List - Protect others around you: Learn how to safely use, store and throw away your medicines at www.disposemymeds.org.          This list is accurate as of 1/29/18 10:10 AM.  Always use your most recent med list.                   Brand Name Dispense Instructions for use Diagnosis    prenatal multivitamin plus iron 27-0.8 MG Tabs per tablet      Take 1 tablet by mouth daily Reported on 3/9/2017

## 2018-02-07 ENCOUNTER — PRENATAL OFFICE VISIT (OUTPATIENT)
Dept: OBGYN | Facility: CLINIC | Age: 38
End: 2018-02-07
Payer: COMMERCIAL

## 2018-02-07 VITALS
SYSTOLIC BLOOD PRESSURE: 109 MMHG | WEIGHT: 179.4 LBS | DIASTOLIC BLOOD PRESSURE: 68 MMHG | HEART RATE: 75 BPM | BODY MASS INDEX: 30.79 KG/M2

## 2018-02-07 DIAGNOSIS — Z34.82 ENCOUNTER FOR SUPERVISION OF OTHER NORMAL PREGNANCY IN SECOND TRIMESTER: Primary | ICD-10-CM

## 2018-02-07 PROCEDURE — 99207 ZZC PRENATAL VISIT: CPT | Performed by: OBSTETRICS & GYNECOLOGY

## 2018-02-07 RX ORDER — OXYTOCIN/0.9 % SODIUM CHLORIDE 30/500 ML
100-340 PLASTIC BAG, INJECTION (ML) INTRAVENOUS CONTINUOUS PRN
Status: CANCELLED | OUTPATIENT
Start: 2018-02-07

## 2018-02-07 RX ORDER — FENTANYL CITRATE 50 UG/ML
50-100 INJECTION, SOLUTION INTRAMUSCULAR; INTRAVENOUS
Status: CANCELLED | OUTPATIENT
Start: 2018-02-07

## 2018-02-07 RX ORDER — ACETAMINOPHEN 325 MG/1
650 TABLET ORAL EVERY 4 HOURS PRN
Status: CANCELLED | OUTPATIENT
Start: 2018-02-07

## 2018-02-07 RX ORDER — OXYCODONE AND ACETAMINOPHEN 5; 325 MG/1; MG/1
1 TABLET ORAL
Status: CANCELLED | OUTPATIENT
Start: 2018-02-07

## 2018-02-07 RX ORDER — SODIUM CHLORIDE, SODIUM LACTATE, POTASSIUM CHLORIDE, CALCIUM CHLORIDE 600; 310; 30; 20 MG/100ML; MG/100ML; MG/100ML; MG/100ML
INJECTION, SOLUTION INTRAVENOUS CONTINUOUS
Status: CANCELLED | OUTPATIENT
Start: 2018-02-07

## 2018-02-07 RX ORDER — IBUPROFEN 200 MG
800 TABLET ORAL
Status: CANCELLED | OUTPATIENT
Start: 2018-02-07

## 2018-02-07 RX ORDER — OXYTOCIN 10 [USP'U]/ML
10 INJECTION, SOLUTION INTRAMUSCULAR; INTRAVENOUS
Status: CANCELLED | OUTPATIENT
Start: 2018-02-07

## 2018-02-07 RX ORDER — CITRIC ACID/SODIUM CITRATE 334-500MG
30 SOLUTION, ORAL ORAL ONCE
Status: CANCELLED | OUTPATIENT
Start: 2018-02-07 | End: 2018-02-07

## 2018-02-07 RX ORDER — CARBOPROST TROMETHAMINE 250 UG/ML
250 INJECTION, SOLUTION INTRAMUSCULAR
Status: CANCELLED | OUTPATIENT
Start: 2018-02-07

## 2018-02-07 RX ORDER — NALOXONE HYDROCHLORIDE 0.4 MG/ML
.1-.4 INJECTION, SOLUTION INTRAMUSCULAR; INTRAVENOUS; SUBCUTANEOUS
Status: CANCELLED | OUTPATIENT
Start: 2018-02-07

## 2018-02-07 RX ORDER — ONDANSETRON 2 MG/ML
4 INJECTION INTRAMUSCULAR; INTRAVENOUS EVERY 6 HOURS PRN
Status: CANCELLED | OUTPATIENT
Start: 2018-02-07

## 2018-02-07 RX ORDER — METHYLERGONOVINE MALEATE 0.2 MG/ML
200 INJECTION INTRAVENOUS
Status: CANCELLED | OUTPATIENT
Start: 2018-02-07

## 2018-02-07 NOTE — MR AVS SNAPSHOT
After Visit Summary   2/7/2018    Allyn Mcqueen    MRN: [id number]           Patient Information     Date Of Birth          1980        Visit Information        Provider Department      2/7/2018 4:00 PM Mireille Figueroa MD [facility]        Today's Diagnoses     Encounter for supervision of other normal pregnancy in third trimester    -  1       Follow-ups after your visit        Your next 10 appointments already scheduled     Feb 13, 2018  3:45 PM [facility]   [facility] Established Prenatal with Sally Henderson MD   [facility] ([facility])    [address] [place] [address] [phone]              Who to contact     If you have questions or need follow up information about today's clinic visit or your schedule please contact [facility] directly at [phone].  Normal or non-critical lab and imaging results will be communicated to you by MyChart, letter or phone within 4 business days after the clinic has received the results. If you do not hear from us within 7 days, please contact the clinic through Tribunathart or phone. If you have a critical or abnormal lab result, we will notify you by phone as soon as possible.  Submit refill requests through [email or web address] or call your pharmacy and they will forward the refill request to us. Please allow 3 business days for your refill to be completed.          Additional Information About Your Visit        MyChart Information     Sol[employer] gives you secure access to your electronic health record. If you see a primary care provider, you can also send messages to your care team and make appointments. If you have questions, please call your primary care clinic.  If you do not have a primary care provider, please call [phone] and they will assist you.        Care EveryWhere ID     This is your Care EveryWhere ID. This could be used by other organizations to access your  [place] medical records  [id number]        Your Vitals Were     Pulse Last Period BMI (Body Mass Index)             75 05/15/2017 30.79 kg/m2          Blood Pressure from Last 3 Encounters:   02/07/18 109/68   01/29/18 102/63   01/22/18 116/66    Weight from Last 3 Encounters:   02/07/18 179 lb 6.4 oz (81.4 kg)   01/29/18 177 lb (80.3 kg)   01/22/18 175 lb 14.4 oz (79.8 kg)              Today, you had the following     No orders found for display       Primary Care Provider Office Phone # Fax #    Noelle Snehal Lundy [phone] [address] [facility] [address]        Equal Access to Services     JALIL PERKINS : Terrence simpson Sokristina, waaxda luqadaha, qaybta kaalmada adeegyada, yuliya cruz . So [facility] [phone].    ATENCIÓN: Si habla español, tiene a ferrer disposición servicios gratuitos de asistencia lingüística. Llame al [phone].    We comply with applicable federal civil rights laws and [place] laws. We do not discriminate on the basis of race, color, national origin, age, disability, sex, sexual orientation, or gender identity.            Thank you!     Thank you for choosing [facility]  for your care. Our goal is always to provide you with excellent care. Hearing back from our patients is one way we can continue to improve our services. Please take a few minutes to complete the written survey that you may receive in the mail after your visit with us. Thank you!             Your Updated Medication List - Protect others around you: Learn how to safely use, store and throw away your medicines at www.disposemymeds.org.          This list is accurate as of 2/7/18  4:27 PM.  Always use your most recent med list.                   Brand Name Dispense Instructions for use Diagnosis    prenatal multivitamin plus iron 27-0.8 MG Tabs per tablet      Take 1 tablet by mouth daily Reported on 3/9/2017

## 2018-02-07 NOTE — PROGRESS NOTES
38w2d feeling ok still, but getting a little more tired.  Good fm.  Some ctx here and there, nothing regular yet.  GBS neg.   Labor instructions and kick counts reviewed. RTC weekly  roxanne

## 2018-02-14 ENCOUNTER — PRENATAL OFFICE VISIT (OUTPATIENT)
Dept: OBGYN | Facility: CLINIC | Age: 38
End: 2018-02-14
Payer: COMMERCIAL

## 2018-02-14 VITALS — BODY MASS INDEX: 30.76 KG/M2 | DIASTOLIC BLOOD PRESSURE: 73 MMHG | SYSTOLIC BLOOD PRESSURE: 115 MMHG | WEIGHT: 179.2 LBS

## 2018-02-14 DIAGNOSIS — Z34.82 ENCOUNTER FOR SUPERVISION OF OTHER NORMAL PREGNANCY IN SECOND TRIMESTER: Primary | ICD-10-CM

## 2018-02-14 PROCEDURE — 99207 ZZC PRENATAL VISIT: CPT | Performed by: OBSTETRICS & GYNECOLOGY

## 2018-02-14 NOTE — MR AVS SNAPSHOT
After Visit Summary   2/14/2018    Allyn Mcqueen    MRN: [id number]           Patient Information     Date Of Birth          1980        Visit Information        Provider Department      2/14/2018 12:00 PM Katina Sommers MD [facility]        Today's Diagnoses     Encounter for supervision of other normal pregnancy in third trimester    -  1       Follow-ups after your visit        Follow-up notes from your care team     Return in about 1 week (around 2/21/2018).      Who to contact     If you have questions or need follow up information about today's clinic visit or your schedule please contact [facility] directly at [phone].  Normal or non-critical lab and imaging results will be communicated to you by MyChart, letter or phone within 4 business days after the clinic has received the results. If you do not hear from us within 7 days, please contact the clinic through Visionary Mobilehart or phone. If you have a critical or abnormal lab result, we will notify you by phone as soon as possible.  Submit refill requests through Vizolution or call your pharmacy and they will forward the refill request to us. Please allow 3 business days for your refill to be completed.          Additional Information About Your Visit        MyChart Information     V[employer] gives you secure access to your electronic health record. If you see a primary care provider, you can also send messages to your care team and make appointments. If you have questions, please call your primary care clinic.  If you do not have a primary care provider, please call [phone] and they will assist you.        Care EveryWhere ID     This is your Care EveryWhere ID. This could be used by other organizations to access your [place] medical records  [id number]        Your Vitals Were     Last Period BMI (Body Mass Index)                05/15/2017 30.76 kg/m2           Blood Pressure from Last 3 Encounters:    02/14/18 115/73   02/07/18 109/68   01/29/18 102/63    Weight from Last 3 Encounters:   02/14/18 179 lb 3.2 oz (81.3 kg)   02/07/18 179 lb 6.4 oz (81.4 kg)   01/29/18 177 lb (80.3 kg)              Today, you had the following     No orders found for display       Primary Care Provider Office Phone # Fax #    Noelle Lundy [phone]       [address]  [facility] [address]        Equal Access to Services     [facility]: Hadii aad ku hadasho Soomaali, waaxda luqadaha, qaybta kaalmada sunny, yuliya cruz . So [facility] [place] [phone].    ATENCIÓN: Si habla español, tiene a ferrer disposición servicios gratuitos de asistencia lingüística. [facility] [phone].    We comply with applicable federal civil rights laws and [place] laws. We do not discriminate on the basis of race, color, national origin, age, disability, sex, sexual orientation, or gender identity.            Thank you!     Thank you for choosing [facility]  for your care. Our goal is always to provide you with excellent care. Hearing back from our patients is one way we can continue to improve our services. Please take a few minutes to complete the written survey that you may receive in the mail after your visit with us. Thank you!             Your Updated Medication List - Protect others around you: Learn how to safely use, store and throw away your medicines at www.disposemymeds.org.          This list is accurate as of 2/14/18  2:07 PM.  Always use your most recent med list.                   Brand Name Dispense Instructions for use Diagnosis    prenatal multivitamin plus iron 27-0.8 MG Tabs per tablet      Take 1 tablet by mouth daily Reported on 3/9/2017

## 2018-02-14 NOTE — PROGRESS NOTES
39w2d  Active fetal movement. irreg contractions. Increased discharge, no blood, no leaking fluid.   RTC weekly. Plan SCE next week.   Katina Sommers MD

## 2018-02-17 ENCOUNTER — HOSPITAL ENCOUNTER (INPATIENT)
Facility: CLINIC | Age: 38
LOS: 3 days | Discharge: HOME | End: 2018-02-20
Attending: OBSTETRICS & GYNECOLOGY | Admitting: OBSTETRICS & GYNECOLOGY
Payer: COMMERCIAL

## 2018-02-17 ENCOUNTER — ANESTHESIA (OUTPATIENT)
Dept: OBGYN | Facility: CLINIC | Age: 38
End: 2018-02-17
Payer: COMMERCIAL

## 2018-02-17 ENCOUNTER — NURSE TRIAGE (OUTPATIENT)
Dept: NURSING | Facility: CLINIC | Age: 38
End: 2018-02-17

## 2018-02-17 ENCOUNTER — TELEPHONE (OUTPATIENT)
Dept: OBGYN | Facility: CLINIC | Age: 38
End: 2018-02-17

## 2018-02-17 ENCOUNTER — ANESTHESIA EVENT (OUTPATIENT)
Dept: OBGYN | Facility: CLINIC | Age: 38
End: 2018-02-17
Payer: COMMERCIAL

## 2018-02-17 LAB
ABO + RH BLD: NORMAL
ABO + RH BLD: NORMAL
BASOPHILS # BLD AUTO: 0 10E9/L (ref 0–0.2)
BASOPHILS NFR BLD AUTO: 0.2 %
BLD GP AB SCN SERPL QL: NORMAL
BLOOD BANK CMNT PATIENT-IMP: NORMAL
DIFFERENTIAL METHOD BLD: NORMAL
EOSINOPHIL # BLD AUTO: 0 10E9/L (ref 0–0.7)
EOSINOPHIL NFR BLD AUTO: 0.3 %
ERYTHROCYTE [DISTWIDTH] IN BLOOD BY AUTOMATED COUNT: 14.2 % (ref 10–15)
HCT VFR BLD AUTO: 37.8 % (ref 35–47)
HGB BLD-MCNC: 12.6 G/DL (ref 11.7–15.7)
IMM GRANULOCYTES # BLD: 0.1 10E9/L (ref 0–0.4)
IMM GRANULOCYTES NFR BLD: 0.8 %
LYMPHOCYTES # BLD AUTO: 2.6 10E9/L (ref 0.8–5.3)
LYMPHOCYTES NFR BLD AUTO: 25.5 %
MCH RBC QN AUTO: 31 PG (ref 26.5–33)
MCHC RBC AUTO-ENTMCNC: 33.3 G/DL (ref 31.5–36.5)
MCV RBC AUTO: 93 FL (ref 78–100)
MONOCYTES # BLD AUTO: 0.6 10E9/L (ref 0–1.3)
MONOCYTES NFR BLD AUTO: 6 %
NEUTROPHILS # BLD AUTO: 6.8 10E9/L (ref 1.6–8.3)
NEUTROPHILS NFR BLD AUTO: 67.2 %
NRBC # BLD AUTO: 0 10*3/UL
NRBC BLD AUTO-RTO: 0 /100
PLATELET # BLD AUTO: 166 10E9/L (ref 150–450)
RBC # BLD AUTO: 4.06 10E12/L (ref 3.8–5.2)
SPECIMEN EXP DATE BLD: NORMAL
WBC # BLD AUTO: 10.1 10E9/L (ref 4–11)

## 2018-02-17 PROCEDURE — 00HU33Z INSERTION OF INFUSION DEVICE INTO SPINAL CANAL, PERCUTANEOUS APPROACH: ICD-10-PCS | Performed by: ANESTHESIOLOGY

## 2018-02-17 PROCEDURE — 12000032 ZZH R&B OB CRITICAL UMMC

## 2018-02-17 PROCEDURE — 3E0R3BZ INTRODUCTION OF ANESTHETIC AGENT INTO SPINAL CANAL, PERCUTANEOUS APPROACH: ICD-10-PCS | Performed by: ANESTHESIOLOGY

## 2018-02-17 PROCEDURE — 85025 COMPLETE CBC W/AUTO DIFF WBC: CPT | Performed by: OBSTETRICS & GYNECOLOGY

## 2018-02-17 PROCEDURE — 86901 BLOOD TYPING SEROLOGIC RH(D): CPT | Performed by: OBSTETRICS & GYNECOLOGY

## 2018-02-17 PROCEDURE — 86850 RBC ANTIBODY SCREEN: CPT | Performed by: OBSTETRICS & GYNECOLOGY

## 2018-02-17 PROCEDURE — 10907ZC DRAINAGE OF AMNIOTIC FLUID, THERAPEUTIC FROM PRODUCTS OF CONCEPTION, VIA NATURAL OR ARTIFICIAL OPENING: ICD-10-PCS | Performed by: OBSTETRICS & GYNECOLOGY

## 2018-02-17 PROCEDURE — 86900 BLOOD TYPING SEROLOGIC ABO: CPT | Performed by: OBSTETRICS & GYNECOLOGY

## 2018-02-17 PROCEDURE — 86780 TREPONEMA PALLIDUM: CPT | Performed by: OBSTETRICS & GYNECOLOGY

## 2018-02-17 PROCEDURE — 36415 COLL VENOUS BLD VENIPUNCTURE: CPT | Performed by: OBSTETRICS & GYNECOLOGY

## 2018-02-17 PROCEDURE — G0463 HOSPITAL OUTPT CLINIC VISIT: HCPCS

## 2018-02-17 RX ORDER — OXYTOCIN/0.9 % SODIUM CHLORIDE 30/500 ML
100-340 PLASTIC BAG, INJECTION (ML) INTRAVENOUS CONTINUOUS PRN
Status: DISCONTINUED | OUTPATIENT
Start: 2018-02-17 | End: 2018-02-18

## 2018-02-17 RX ORDER — CARBOPROST TROMETHAMINE 250 UG/ML
250 INJECTION, SOLUTION INTRAMUSCULAR
Status: DISCONTINUED | OUTPATIENT
Start: 2018-02-17 | End: 2018-02-18

## 2018-02-17 RX ORDER — OXYTOCIN 10 [USP'U]/ML
10 INJECTION, SOLUTION INTRAMUSCULAR; INTRAVENOUS
Status: DISCONTINUED | OUTPATIENT
Start: 2018-02-17 | End: 2018-02-18

## 2018-02-17 RX ORDER — ONDANSETRON 2 MG/ML
4 INJECTION INTRAMUSCULAR; INTRAVENOUS EVERY 6 HOURS PRN
Status: DISCONTINUED | OUTPATIENT
Start: 2018-02-17 | End: 2018-02-18

## 2018-02-17 RX ORDER — CITRIC ACID/SODIUM CITRATE 334-500MG
30 SOLUTION, ORAL ORAL ONCE
Status: DISCONTINUED | OUTPATIENT
Start: 2018-02-17 | End: 2018-02-18

## 2018-02-17 RX ORDER — OXYCODONE AND ACETAMINOPHEN 5; 325 MG/1; MG/1
1 TABLET ORAL
Status: DISCONTINUED | OUTPATIENT
Start: 2018-02-17 | End: 2018-02-18

## 2018-02-17 RX ORDER — NALOXONE HYDROCHLORIDE 0.4 MG/ML
.1-.4 INJECTION, SOLUTION INTRAMUSCULAR; INTRAVENOUS; SUBCUTANEOUS
Status: DISCONTINUED | OUTPATIENT
Start: 2018-02-17 | End: 2018-02-18

## 2018-02-17 RX ORDER — ACETAMINOPHEN 325 MG/1
650 TABLET ORAL EVERY 4 HOURS PRN
Status: DISCONTINUED | OUTPATIENT
Start: 2018-02-17 | End: 2018-02-18

## 2018-02-17 RX ORDER — FENTANYL CITRATE 50 UG/ML
50-100 INJECTION, SOLUTION INTRAMUSCULAR; INTRAVENOUS
Status: DISCONTINUED | OUTPATIENT
Start: 2018-02-17 | End: 2018-02-18

## 2018-02-17 RX ORDER — NALBUPHINE HYDROCHLORIDE 10 MG/ML
2.5-5 INJECTION, SOLUTION INTRAMUSCULAR; INTRAVENOUS; SUBCUTANEOUS EVERY 6 HOURS PRN
Status: DISCONTINUED | OUTPATIENT
Start: 2018-02-17 | End: 2018-02-18

## 2018-02-17 RX ORDER — METHYLERGONOVINE MALEATE 0.2 MG/ML
200 INJECTION INTRAVENOUS
Status: DISCONTINUED | OUTPATIENT
Start: 2018-02-17 | End: 2018-02-18

## 2018-02-17 RX ORDER — IBUPROFEN 800 MG/1
800 TABLET, FILM COATED ORAL
Status: COMPLETED | OUTPATIENT
Start: 2018-02-17 | End: 2018-02-18

## 2018-02-17 RX ORDER — SODIUM CHLORIDE, SODIUM LACTATE, POTASSIUM CHLORIDE, CALCIUM CHLORIDE 600; 310; 30; 20 MG/100ML; MG/100ML; MG/100ML; MG/100ML
INJECTION, SOLUTION INTRAVENOUS CONTINUOUS
Status: DISCONTINUED | OUTPATIENT
Start: 2018-02-17 | End: 2018-02-18

## 2018-02-17 RX ORDER — EPHEDRINE SULFATE 50 MG/ML
5 INJECTION, SOLUTION INTRAMUSCULAR; INTRAVENOUS; SUBCUTANEOUS
Status: DISCONTINUED | OUTPATIENT
Start: 2018-02-17 | End: 2018-02-18

## 2018-02-17 NOTE — IP AVS SNAPSHOT
[facility]    [address] [address]    Phone:  [phone]                                       After Visit Summary   2/17/2018    Allyn Mcqueen    MRN: [id number]           After Visit Summary Signature Page     I have received my discharge instructions, and my questions have been answered. I have discussed any challenges I see with this plan with the nurse or doctor.    ..........................................................................................................................................  Patient/Patient Representative Signature      ..........................................................................................................................................  Patient Representative Print Name and Relationship to Patient    ..................................................               ................................................  Date                                            Time    ..........................................................................................................................................  Reviewed by Signature/Title    ...................................................              ..............................................  Date                                                            Time

## 2018-02-17 NOTE — TELEPHONE ENCOUNTER
"  Reason for Disposition    [1] History of prior delivery (multipara) AND [2] contractions < 10 minutes apart AND [3] present 1 hour    Additional Information    Negative: Passed out (i.e., lost consciousness, collapsed and was not responding)    Negative: Shock suspected (e.g., cold/pale/clammy skin, too weak to stand, low BP, rapid pulse)    Negative: Difficult to awaken or acting confused  (e.g., disoriented, slurred speech)    Negative: [1] SEVERE abdominal pain (e.g., excruciating) AND [2] constant AND [3] present > 1 hour    Negative: Severe bleeding (e.g., continuous red blood from vagina, or large blood clots)    Negative: Umbilical cord hanging out of the vagina (shiny, white, curled appearance, \"like telephone cord\")    Negative: Uncontrollable urge to push (i.e., feels like baby is coming out now)    Negative: Can see baby    Negative: Sounds like a life-threatening emergency to the triager    Negative: Pregnant < 37 weeks (i.e., )    Negative: [1] Uncertain delivery date AND [2] possibly pregnant < 37 weeks (i.e., )    Negative: [1] First baby (primipara) AND [2] contractions < 6 minutes apart  AND [3] present 2 hours    Answer Assessment - Initial Assessment Questions  1. ONSET: \"When did the symptoms begin?\"         2 hours  2. CONTRACTIONS: \"Describe the contractions that you are having.\" (e.g., duration, frequency, regularity, severity)      6 minutes  3. KATTY: \"What date are you expecting to deliver?\"      18  4. PARITY: \"Have you had a baby before?\" If yes, \"How long did the labor last?\"      second  5. FETAL MOVEMENT: \"Has the baby's movement decreased or changed significantly from normal?\"      ok  6. OTHER SYMPTOMS: \"Do you have any other symptoms?\" (e.g., leaking fluid from vagina, vaginal bleeding, fever, hand/facial swelling)      Mucous plug with a little blood earlier    Protocols used: PREGNANCY - LABOR-ADULT-AH    "

## 2018-02-17 NOTE — IP AVS SNAPSHOT
MRN:[id number]                      After Visit Summary   2/17/2018    Allyn Mcqueen    MRN: [id number]           Thank you!     Thank you for choosing [facility] for your care. Our goal is always to provide you with excellent care. Hearing back from our patients is one way we can continue to improve our services. Please take a few minutes to complete the written survey that you may receive in the mail after you visit with us. Thank you!        Patient Information     Date Of Birth          1980        Designated Caregiver       Most Recent Value    Caregiver    Will someone help with your care after discharge? yes    Name of designated caregiver Jesus    Phone number of caregiver see facesheet    Caregiver address see facesheet      About your hospital stay     You were [facility] on:  February 17, 2018 [facility]    You were discharged on:  February 20, 2018        Reason for your hospital stay       Maternity care                  Who to Call     For medical emergencies, please call 911.  For non-urgent questions about your medical care, please call your primary care provider or clinic, [phone]          Attending Provider     Provider Specialty    Sally Henderson MD OB/Gyn    Sabrina Alarcon MD OB/Gyn       Primary Care Provider Office Phone # Fax #    Noelle Lundy [phone]      After Care Instructions     Activity       Review discharge instructions            Diet       Resume previous diet            Discharge Instructions - Postpartum visit       Schedule postpartum visit with your provider and return to clinic in 6 weeks.                  Your next 10 appointments already scheduled     Feb 20, 2018 10:15 AM [facility] Established Prenatal with Mireille Figueroa MD   [facility] ([facility])    [address] [place] [id number] [phone]               Further instructions from your care team       Postpartum Vaginal Delivery Instructions    Activity       Ask family and friends for help when you need it.    Do not place anything in your vagina for 6 weeks.    You are not restricted on other activities, but take it easy for a few weeks to allow your body to recover from delivery.  You are able to do any activities you feel up to that point.    No driving until you have stopped taking your pain medications (usually two weeks after delivery).     Call your health care provider if you have any of these symptoms:       Increased pain, swelling, redness, or fluid around your stiches from an episiotomy or perineal tear.    A fever above 100.4 F (38 C) with or without chills when placing a thermometer under your tongue.    You soak a sanitary pad with blood within 1 hour, or you see blood clots larger than a golf ball.    Bleeding that lasts more than 6 weeks.    Vaginal discharge that smells bad.    Severe pain, cramping or tenderness in your lower belly area.    A need to urinate more frequently (use the toilet more often), more urgently (use the toilet very quickly), or it burns when you urinate.    Nausea and vomiting.    Redness, swelling or pain around a vein in your leg.    Problems breastfeeding or a red or painful area on your breast.    Chest pain and cough or are gasping for air.    Problems coping with sadness, anxiety, or depression.  If you have any concerns about hurting yourself or the baby, call your provider immediately.     You have questions or concerns after you return home.     Keep your hands clean:  Always wash your hands before touching your perineal area and stitches.  This helps reduce your risk of infection.  If your hands aren't dirty, you may use an alcohol hand-rub to clean your hands. Keep your nails clean and short.        Pending Results     No orders found from 2/15/2018 to 2/18/2018.            Statement of Approval     Ordered           02/20/18 0827  I have reviewed and agree with all the recommendations and orders detailed in this document.  EFFECTIVE NOW     Approved and electronically signed by:  Sally Henderson MD             Admission Information     Date & Time Provider Department Dept. Phone    2/17/2018 Sabrina Alarcon MD [facility] [phone]      Your Vitals Were     Blood Pressure Pulse Temperature Respirations Last Period Pulse Oximetry    112/81 83 97.9  F (36.6  C) (Oral) 16 05/15/2017 99%      MyChart Information     [employer] gives you secure access to your electronic health record. If you see a primary care provider, you can also send messages to your care team and make appointments. If you have questions, please call your primary care clinic.  If you do not have a primary care provider, please call [phone] and they will assist you.        Care EveryWhere ID     This is your Care EveryWhere ID. This could be used by other organizations to access your [facility] medical records  [id number]        Equal Access to Services     JALIL PERKINS AH: Hadii aad ku hadasho Sojesusali, waaxda luqadaha, qaybta kaalmada adeegyada, yuliya curz . So [facility] [phone].    ATENCIÓN: Si habla español, tiene a ferrer disposición servicios gratuitos de asistencia lingüística. Llame al [phone].    We comply with applicable federal civil rights laws and [place] laws. We do not discriminate on the basis of race, color, national origin, age, disability, sex, sexual orientation, or gender identity.               Review of your medicines      START taking        Dose / Directions    acetaminophen 325 MG tablet   Commonly known as:  TYLENOL        Dose:  650 mg   Take 2 tablets (650 mg) by mouth every 4 hours as needed for mild pain or fever (greater than or equal to 38? C /100.4? F (oral) or 38.5? C/ 101.4? F (core).)   Quantity:  60 tablet   Refills:  0       ferrous sulfate 325 (65 FE) MG tablet   Commonly known as:   IRON        Dose:  325 mg   Take 1 tablet (325 mg) by mouth daily   Quantity:  30 tablet   Refills:  2       ibuprofen 600 MG tablet   Commonly known as:  ADVIL/MOTRIN        Dose:  600 mg   Take 1 tablet (600 mg) by mouth every 6 hours as needed for moderate pain   Quantity:  60 tablet   Refills:  0       senna-docusate 8.6-50 MG per tablet   Commonly known as:  SENOKOT-S;PERICOLACE        Dose:  1 tablet   Take 1 tablet by mouth 2 times daily as needed for constipation   Quantity:  100 tablet   Refills:  0            Where to get your medicines      These medications were sent to [facility] [place], [place] - [address]  [address] [place] [address], [facility] [address]     Phone:  [phone]     acetaminophen 325 MG tablet    ferrous sulfate 325 (65 FE) MG tablet    ibuprofen 600 MG tablet    senna-docusate 8.6-50 MG per tablet                Protect others around you: Learn how to safely use, store and throw away your medicines at www.disposemymeds.org.             Medication List: This is a list of all your medications and when to take them. Check marks below indicate your daily home schedule. Keep this list as a reference.      Medications           Morning Afternoon Evening Bedtime As Needed    acetaminophen 325 MG tablet   Commonly known as:  TYLENOL   Take 2 tablets (650 mg) by mouth every 4 hours as needed for mild pain or fever (greater than or equal to 38? C /100.4? F (oral) or 38.5? C/ 101.4? F (core).)   Last time this was given:  650 mg on 2/20/2018  8:20 AM                                ferrous sulfate 325 (65 FE) MG tablet   Commonly known as:  IRON   Take 1 tablet (325 mg) by mouth daily   Last time this was given:  325 mg on 2/20/2018  8:20 AM                                ibuprofen 600 MG tablet   Commonly known as:  ADVIL/MOTRIN   Take 1 tablet (600 mg) by mouth every 6 hours as needed for moderate pain   Last time this was given:  800 mg on 2/20/2018  4:10 AM                                 senna-docusate 8.6-50 MG per tablet   Commonly known as:  SENOKOT-S;PERICOLACE   Take 1 tablet by mouth 2 times daily as needed for constipation   Last time this was given:  1 tablet on 2/20/2018  8:21 AM

## 2018-02-18 LAB — T PALLIDUM IGG+IGM SER QL: NEGATIVE

## 2018-02-18 PROCEDURE — 25000128 H RX IP 250 OP 636: Performed by: ANESTHESIOLOGY

## 2018-02-18 PROCEDURE — 0KQM0ZZ REPAIR PERINEUM MUSCLE, OPEN APPROACH: ICD-10-PCS | Performed by: OBSTETRICS & GYNECOLOGY

## 2018-02-18 PROCEDURE — 25000125 ZZHC RX 250: Performed by: ANESTHESIOLOGY

## 2018-02-18 PROCEDURE — 12000032 ZZH R&B OB CRITICAL UMMC

## 2018-02-18 PROCEDURE — 25000132 ZZH RX MED GY IP 250 OP 250 PS 637: Performed by: OBSTETRICS & GYNECOLOGY

## 2018-02-18 PROCEDURE — 25000128 H RX IP 250 OP 636: Performed by: OBSTETRICS & GYNECOLOGY

## 2018-02-18 PROCEDURE — 25000132 ZZH RX MED GY IP 250 OP 250 PS 637

## 2018-02-18 PROCEDURE — 25000125 ZZHC RX 250: Performed by: STUDENT IN AN ORGANIZED HEALTH CARE EDUCATION/TRAINING PROGRAM

## 2018-02-18 PROCEDURE — 59400 OBSTETRICAL CARE: CPT | Mod: GC | Performed by: OBSTETRICS & GYNECOLOGY

## 2018-02-18 RX ORDER — ACETAMINOPHEN 325 MG/1
650 TABLET ORAL EVERY 4 HOURS PRN
Status: DISCONTINUED | OUTPATIENT
Start: 2018-02-18 | End: 2018-02-20 | Stop reason: HOSPADM

## 2018-02-18 RX ORDER — LIDOCAINE HYDROCHLORIDE 10 MG/ML
INJECTION, SOLUTION EPIDURAL; INFILTRATION; INTRACAUDAL; PERINEURAL
Status: DISCONTINUED
Start: 2018-02-18 | End: 2018-02-18 | Stop reason: HOSPADM

## 2018-02-18 RX ORDER — LIDOCAINE 40 MG/G
CREAM TOPICAL
Status: DISCONTINUED | OUTPATIENT
Start: 2018-02-18 | End: 2018-02-18

## 2018-02-18 RX ORDER — AMOXICILLIN 250 MG
1 CAPSULE ORAL 2 TIMES DAILY PRN
Status: DISCONTINUED | OUTPATIENT
Start: 2018-02-18 | End: 2018-02-20 | Stop reason: HOSPADM

## 2018-02-18 RX ORDER — HYDROCORTISONE 2.5 %
CREAM (GRAM) TOPICAL 3 TIMES DAILY PRN
Status: DISCONTINUED | OUTPATIENT
Start: 2018-02-18 | End: 2018-02-20 | Stop reason: HOSPADM

## 2018-02-18 RX ORDER — OXYTOCIN/0.9 % SODIUM CHLORIDE 30/500 ML
PLASTIC BAG, INJECTION (ML) INTRAVENOUS
Status: DISCONTINUED
Start: 2018-02-18 | End: 2018-02-18 | Stop reason: WASHOUT

## 2018-02-18 RX ORDER — IBUPROFEN 800 MG/1
800 TABLET, FILM COATED ORAL EVERY 6 HOURS PRN
Status: DISCONTINUED | OUTPATIENT
Start: 2018-02-18 | End: 2018-02-20 | Stop reason: HOSPADM

## 2018-02-18 RX ORDER — MISOPROSTOL 200 UG/1
TABLET ORAL
Status: COMPLETED
Start: 2018-02-18 | End: 2018-02-18

## 2018-02-18 RX ORDER — OXYTOCIN/0.9 % SODIUM CHLORIDE 30/500 ML
100 PLASTIC BAG, INJECTION (ML) INTRAVENOUS CONTINUOUS
Status: DISCONTINUED | OUTPATIENT
Start: 2018-02-18 | End: 2018-02-20 | Stop reason: HOSPADM

## 2018-02-18 RX ORDER — LANOLIN 100 %
OINTMENT (GRAM) TOPICAL
Status: DISCONTINUED | OUTPATIENT
Start: 2018-02-18 | End: 2018-02-20 | Stop reason: HOSPADM

## 2018-02-18 RX ORDER — OXYTOCIN 10 [USP'U]/ML
INJECTION, SOLUTION INTRAMUSCULAR; INTRAVENOUS
Status: DISCONTINUED
Start: 2018-02-18 | End: 2018-02-18 | Stop reason: HOSPADM

## 2018-02-18 RX ORDER — OXYTOCIN/0.9 % SODIUM CHLORIDE 30/500 ML
340 PLASTIC BAG, INJECTION (ML) INTRAVENOUS CONTINUOUS PRN
Status: DISCONTINUED | OUTPATIENT
Start: 2018-02-18 | End: 2018-02-20 | Stop reason: HOSPADM

## 2018-02-18 RX ORDER — OXYTOCIN/0.9 % SODIUM CHLORIDE 30/500 ML
1-24 PLASTIC BAG, INJECTION (ML) INTRAVENOUS CONTINUOUS
Status: DISCONTINUED | OUTPATIENT
Start: 2018-02-18 | End: 2018-02-18

## 2018-02-18 RX ORDER — MISOPROSTOL 200 UG/1
400 TABLET ORAL
Status: DISCONTINUED | OUTPATIENT
Start: 2018-02-18 | End: 2018-02-20 | Stop reason: HOSPADM

## 2018-02-18 RX ORDER — NALOXONE HYDROCHLORIDE 0.4 MG/ML
.1-.4 INJECTION, SOLUTION INTRAMUSCULAR; INTRAVENOUS; SUBCUTANEOUS
Status: DISCONTINUED | OUTPATIENT
Start: 2018-02-18 | End: 2018-02-20 | Stop reason: HOSPADM

## 2018-02-18 RX ORDER — OXYTOCIN 10 [USP'U]/ML
10 INJECTION, SOLUTION INTRAMUSCULAR; INTRAVENOUS
Status: DISCONTINUED | OUTPATIENT
Start: 2018-02-18 | End: 2018-02-20 | Stop reason: HOSPADM

## 2018-02-18 RX ORDER — AMOXICILLIN 250 MG
2 CAPSULE ORAL 2 TIMES DAILY PRN
Status: DISCONTINUED | OUTPATIENT
Start: 2018-02-18 | End: 2018-02-20 | Stop reason: HOSPADM

## 2018-02-18 RX ORDER — ACETAMINOPHEN 325 MG/1
650 TABLET ORAL EVERY 4 HOURS PRN
Qty: 60 TABLET | Refills: 0 | Status: SHIPPED | OUTPATIENT
Start: 2018-02-18 | End: 2019-10-31

## 2018-02-18 RX ORDER — IBUPROFEN 600 MG/1
600 TABLET, FILM COATED ORAL EVERY 6 HOURS PRN
Qty: 60 TABLET | Refills: 0 | Status: SHIPPED | OUTPATIENT
Start: 2018-02-18 | End: 2019-10-31

## 2018-02-18 RX ORDER — BISACODYL 10 MG
10 SUPPOSITORY, RECTAL RECTAL DAILY PRN
Status: DISCONTINUED | OUTPATIENT
Start: 2018-02-20 | End: 2018-02-20 | Stop reason: HOSPADM

## 2018-02-18 RX ADMIN — LIDOCAINE HYDROCHLORIDE,EPINEPHRINE BITARTRATE 3 ML: 15; .005 INJECTION, SOLUTION EPIDURAL; INFILTRATION; INTRACAUDAL; PERINEURAL at 00:18

## 2018-02-18 RX ADMIN — Medication 5 ML: at 00:28

## 2018-02-18 RX ADMIN — MISOPROSTOL 400 MCG: 200 TABLET ORAL at 12:41

## 2018-02-18 RX ADMIN — SODIUM CHLORIDE, POTASSIUM CHLORIDE, SODIUM LACTATE AND CALCIUM CHLORIDE: 600; 310; 30; 20 INJECTION, SOLUTION INTRAVENOUS at 09:45

## 2018-02-18 RX ADMIN — IBUPROFEN 800 MG: 800 TABLET, FILM COATED ORAL at 14:08

## 2018-02-18 RX ADMIN — IBUPROFEN 800 MG: 800 TABLET, FILM COATED ORAL at 19:55

## 2018-02-18 RX ADMIN — Medication: at 00:27

## 2018-02-18 RX ADMIN — SENNOSIDES AND DOCUSATE SODIUM 1 TABLET: 8.6; 5 TABLET ORAL at 19:54

## 2018-02-18 RX ADMIN — SODIUM CHLORIDE, POTASSIUM CHLORIDE, SODIUM LACTATE AND CALCIUM CHLORIDE: 600; 310; 30; 20 INJECTION, SOLUTION INTRAVENOUS at 02:03

## 2018-02-18 RX ADMIN — Medication 10 ML/HR: at 00:29

## 2018-02-18 RX ADMIN — SODIUM CHLORIDE, POTASSIUM CHLORIDE, SODIUM LACTATE AND CALCIUM CHLORIDE 1000 ML: 600; 310; 30; 20 INJECTION, SOLUTION INTRAVENOUS at 00:01

## 2018-02-18 RX ADMIN — ACETAMINOPHEN 650 MG: 325 TABLET, FILM COATED ORAL at 16:10

## 2018-02-18 RX ADMIN — Medication 5 ML: at 00:23

## 2018-02-18 RX ADMIN — OXYTOCIN-SODIUM CHLORIDE 0.9% IV SOLN 30 UNIT/500ML 2 MILLI-UNITS/MIN: 30-0.9/5 SOLUTION at 03:45

## 2018-02-18 RX ADMIN — ACETAMINOPHEN 650 MG: 325 TABLET, FILM COATED ORAL at 19:54

## 2018-02-18 NOTE — PROGRESS NOTES
Regular ctx, clear fluid leaking.  Cx 8 cm, 100%, vtx 0.  FHTs with moderate variability, +accels, -decels.  Baseline 130.   Wants epidural.  Anticipate .

## 2018-02-18 NOTE — L&D DELIVERY NOTE
OB Vaginal Delivery Note    Allyn Mcqueen MRN# [id number]   Age: 37 year old YOB: 1980       GA: 39w6d  GP:   Labor Complications: None   QBL: 500 mL  Delivery Type: Vaginal, Spontaneous Delivery   ROM to Delivery Time: 14h 29m  [place] Weight: 3.912 kg (8 lb 10 oz)    1 Minute 5 Minute 10 Minute   Apgar Totals: 9    9                Delivery Details:  Allyn Mcqueen, a 37 year old  who presented at 39w6d in early labor.  She underwent labor augmentation with AROM and IV pitocin. She progressed to complete and started pushing. She then delivered a vigorous baby boy in KIARA positioning.  Apgars 9 and 9. Cord clamping was delayed for 1 minute.  The cord was then doubly clamped and cut.  A cord segment and cord blood was collected.   An intact placenta was delivered at  12:38 PM . Placental disposition was Hospital disposal . Small amount of uterine atony that resolved with fundal massage and sublingual misoprostol. Fundus then found to be firm. There was noted to be a small second degree laceration that was repaired in the usual fashion with 3-0 Vicryl.   mL. Needle count correct. Infant and patient in delivery room in good and stable condition.     Dr. Alarcon was present for delivery and repair.    Physician Attestation  I was present for the entire delivery, including baby and placenta as well as perineal laceration repair.    Sabrina Alarcon  Date of Service (when I saw the patient): 18        Labor Event Times    Labor onset date:  18 Onset time:   8:00 PM            Labor Length    3rd Stage (hrs):  0 (min):  4      Labor Events     labor?:  No    steroids:  None   Labor Type:  Augmentation, AROM      Antibiotics received during labor?:  No      Rupture identifier:  Rupture 1   Rupture date/time: 18   Rupture type:  Artificial Rupture of Membranes   Fluid color:  Clear   Fluid odor:  Normal      Augmentation:  AROM    Labor  "partogram used?:  no         Delivery/Placenta Date and Time    Delivery Date:  18 Delivery Time:  12:34 PM   Placenta Date/Time:  2018 12:38 PM   Oxytocin given at the time of delivery:  after delivery of placenta      Vaginal Counts    Initial count performed by 2 team members:   Two Team Members   MD Brian Antony, RN          [place] [employer] [place] [employer]   Initial counts 2 0 5    Added to count  1 5    Final counts          Placed during labor Accounted for at the end of labor   NA    NA    NA       Final count performed by 2 team members:   Two Team Members   MD Brian Fried, OMEGA         Final count correct?:  Yes         Apgars    Living status:  Living    1 Minute 5 Minute 10 Minute 15 Minute 20 Minute   Skin color: 1  1       Heart rate: 2  2       Reflex irritability: 2  2       Muscle tone: 2  2       Respiratory effort: 2  2       Total: 9  9          Apgars assigned by:  SYEDA KOWALSKI RN      [place] Resuscitation    Methods:  None       Care at Delivery:  Spontaneous cry at delivery.  Baby to mom's chest and dried.       Measurements    Weight:  8 lb 10 oz Length:  1' 8\"   Head circumference:  35.6 cm       Skin to Skin and Feeding Plan    Skin to skin initiation date/time: 18 1234   Skin to skin with:  Mother   Skin to skin end date/time:     Breastfeeding initiated date/time:  2018 1330   How do you plan to feed your baby:  Breastfeeding      Labor Events and Shoulder Dystocia    Fetal Tracing Prior to Delivery:  Category 1   Shoulder dystocia present?:  Neg            Delivery (Maternal) (Provider to Complete) (849667)    Episiotomy:  None   Perineal lacerations:  2nd Repaired?:  Yes         Mother's Information  Mother: Allyn Mcqueen #[id number]    Start of Mother's Information     IO Blood Loss  18 - 18 1636    Mom's I/O Activity            End of Mother's Information  Mother: Allyn Mcqueen " #[id number]            Delivery - Provider to Complete (524714)    Delivering clinician:  MARITZA ALONZO   Attempted Delivery Types (Choose all that apply):  Spontaneous Vaginal Delivery   Delivery Type (Choose the 1 that will go to the Birth History):  Vaginal, Spontaneous Delivery                           Placenta    Delayed Cord Clamping:  Done   Date/Time:  2/18/2018 12:38 PM   Removal:  Spontaneous   Disposition:  Hospital disposal      Presentation and Position    Presentation:  Vertex   Position:  Right Occiput Anterior                    Suzanna Parish MD

## 2018-02-18 NOTE — H&P
[facility] Labor and Delivery History and Physical    Allyn Mcqueen MRN# [id number]   Age: 37 year old YOB: 1980     Date of Admission: 2018    Primary care provider: Noelle Lundy         CC:    Increasing contractions         HPI:     Allyn Mcqueen is a 37 year old  at 39w5d by LMP. She has had a healthy pregnancy other than di/di twin gestation with demise of twin A in the first trimester.     Patient reports contractions of increasing frequency and intensity for the past day. Specifically she denies decreased fetal movement, loss of fluid, vaginal bleeding.  She denies HA, vision changes, chest pain, shortness of breath, nausea, vomiting, or dysuria.        OB Problem List:   1. IUP at 39w5d   2. Di di twin pregnancy, demise of second twin in first trimester         Pregnancy history:     OBSTETRIC HISTORY:    Obstetric History       T1      L1     SAB0   TAB0   Ectopic0   Multiple0   Live Births1       # Outcome Date GA Lbr Hill/2nd Weight Sex Delivery Anes PTL Lv   2 Current            1 Term 07/14/15 39w3d 09:10 / 02:00 3.719 kg (8 lb 3.2 oz) M Vag-Spont EPI  MACIE      Name: Manuel      Apgar1:  9                Apgar5: 9          Prenatal Labs:   Lab Results   Component Value Date    ABO O 2017    RH  Pos 2017    AS Neg 2017    HEPBANG Nonreactive 2017    CHPCRT  2012     Negative for C. trachomatis rRNA by transcription mediated amplification.   A negative result by transcription mediated amplification does not preclude the   presence of C. trachomatis infection because results are dependent on proper   and adequate collection, absence of inhibitors, and sufficient rRNA to be   detected.    GCPCRT  2012     Negative for N. gonorrhoeae rRNA by transcription mediated amplification.   A negative result by transcription mediated amplification does not preclude the   presence of N. gonorrhoeae infection because  results are dependent on proper   and adequate collection, absence of inhibitors, and sufficient rRNA to be   detected.    TREPAB Negative 07/07/2017    HGB 11.6 (L) 01/22/2018       GBS Status:   Lab Results   Component Value Date    GBS Negative 01/22/2018       Active Problem List  Patient Active Problem List   Diagnosis     CARDIOVASCULAR SCREENING; LDL GOAL LESS THAN 160     Need for Tdap vaccination     Continuing pregnancy after intrauterine death of one twin with intrauterine retention     AMA (advanced maternal age) multigravida 35+, second trimester     Encounter for supervision of other normal pregnancy in second trimester     Instability of pelvis or thigh joint     Back pain       Medication Prior to Admission  Prescriptions Prior to Admission   Medication Sig Dispense Refill Last Dose     Prenatal Vit-Fe Fumarate-FA (PRENATAL MULTIVITAMIN  PLUS IRON) 27-0.8 MG TABS Take 1 tablet by mouth daily Reported on 3/9/2017   2/16/2018 at Unknown time           Maternal Past Medical History:     Past Medical History:   Diagnosis Date     Ovarian cyst rupture 04/2010                       Family History:     Family History   Problem Relation Age of Onset     Respiratory Mother      COPD     Psychotic Disorder Mother      Bi polar     MENTAL ILLNESS Mother      diagnosed bipolar; depression     Lipids Father      HEART DISEASE Maternal Grandmother      CEREBROVASCULAR DISEASE Maternal Grandmother      CANCER Paternal Aunt      breast 60s            Social History:     Nonsmoker. No EtOH, tobacco or drugs         Review of Systems:   Negative except as noted above in the HPI         Physical Exam:     Patient Vitals for the past 12 hrs:   BP Temp Temp src Resp   02/17/18 1945 124/76 98  F (36.7  C) Oral 18     Gen: Alert and oriented in NAD. Sitting comfortably in hospital bed conversing easily and smiling.  Resp: No increased work of breathing.  Abdomen: gravid, soft, nontender. EFW 6lbs  Cervix:   4.5/90/-1  Presentation:Cephalic by bedside ultrasound.  Fetal Heart Rate Tracing: , moderate variability, accels present >15x15, No decels  Tocometer: 2-4 in 10 min    Lab:  Hgb: 12.6  Plt: 166  Anti Treponema: pending  RH Pending, Was RH pos on 2017  GBS negative 2018    Imaging:    Dating Ultrasound   GA: 10w1d      Single IUP, +Fetal cardiac activity   Fetal Anatomy Survey   GA: 19w3d     Single IUP, Fetal Anatomy WNL, 3 VC, Placenta: anterior, no placenta previa           Assessment:   Allyn Mcqueen is a 37 year old  at 39w5d by LMP 5/15/2017 admitted for augmentation in early latent labor.        Plan:     1. Latent labor  - Admit to labor and delivery for augmentation - plan AROM when settled in labor room.   - FWB: Cat 1, reactive   - Monitors: intermittent EFM  - GBS: negative, antibiotics not indicated  - Labs: T&S, CBC  - Imaging: as above anterior placenta; EFW 6 lb     Pain control  - unsure, will decide in labor.    Clayton Patterson  MS3 [employer]    G3 addendum. I saw Ms Mcqueen with MS3 [employer] Yes[facility] and agree with his note, made appropriate changes. Ms Mcqueen is a 37 year old  at 39w5d with increasing contractions since yesterday in a generally healthy pregnancy other than demise of twin in early first trimester. GBS negative. I confirmed cephalic presentation with BSUS. She is in latent labor and desires augmentation. This is acceptable as >39 weeks and favorable cervix. We will augment with AROM. EFM category I reactive at admission, intermittent montitoring acceptable. Discussed with Dr. Henderson.     Florinda Yuen, PGY3  OB/Gyn  2018, 10:44 PM        I, Sally Henderson MD, personally saw and evaluated this patient.  I discussed the patient with the resident and care team, and agree with the assessment and plan of care as documented in the resident's note of 18.  I personally reviewed vital signs, medications, lab, and exam.      Key Findings: Early labor, multipara, with favorable cervix.  Desires augmentation of labor.      PLAN: Admit, anticipate .      Sally Henderson MD   Date of Service (when I saw the patient) 18

## 2018-02-18 NOTE — TELEPHONE ENCOUNTER
Patient called,  at 39w5d, prior .  Had contractions yesterday, decreased in the evening, today has had intermittent contractions now q 6 minutes, getting stronger.  Some mucus per vagina, no LOF.  Fetus active.  Discussed, she is about 10 minutes from the hospital, will stay home for now and monitor. If ctx increase in frequency/discomfort, she will come in.  If decrease, she will stay home.

## 2018-02-18 NOTE — PROGRESS NOTES
Labor progress note    S: Comfortable with epidural. Feeling more pressure.     O:   Patient Vitals for the past 12 hrs:   BP Temp Temp src SpO2   18 0900 - - - 98 %   18 0830 - - - 97 %   18 0800 - - - 97 %   18 0730 118/64 98.4  F (36.9  C) Oral 97 %   18 0655 122/69 - - 97 %   18 0615 117/66 98.5  F (36.9  C) Oral 96 %   18 0600 - - - 96 %   18 0530 - - - 96 %   18 0515 126/66 98.5  F (36.9  C) Oral 97 %   18 0430 129/64 - - 97 %   18 0400 - - - 98 %   18 0345 121/63 - - 97 %   18 0330 - - - 97 %   18 0300 - - - 97 %   18 0255 99/50 - - 98 %   18 0230 - 98.2  F (36.8  C) Oral 96 %   18 0200 - - - 97 %   18 0156 112/68 - - 97 %   18 0130 - - - 96 %   18 0120 99/52 - - 98 %   18 0050 101/55 - - 99 %   18 0045 107/57 - - 98 %   18 0040 110/62 - - 99 %   18 0035 112/61 - - -   18 0030 122/57 98.1  F (36.7  C) Oral 100 %   18 0028 123/56 - - -   18 0026 122/67 - - -   18 0025 - - - 100 %   18 0024 106/56 - - -   18 0022 112/54 - - -   18 0020 111/56 - - 100 %   18 0018 123/58 - - -     Gen: lying in bed, NAD  Cx: ant lip/90/+2    EFM: 130 BPM, moderate clare, accels present, occasional variable decelerations  Contractions: q2-3 minutes    A/P  37 year old  at 39w6d by 10w1d US here for labor augmentation.    #) IOL  Slight progress, although slow. Continue Pitocin. IUPC in place.   Near complete and feeling more pressure    #) Fetal wellbeing  Cat I currently.     Suzanna Parish, PGY3  OB/Gyn

## 2018-02-18 NOTE — PROGRESS NOTES
Labor progress note  2018, 4:40 AM     S: Comfortable with epidural    O:   Patient Vitals for the past 12 hrs:   BP Temp Temp src Resp SpO2   18 0400 - - - - 98 %   18 0345 121/63 - - - 97 %   18 0330 - - - - 97 %   18 0300 - - - - 97 %   18 0230 - - - - 96 %   18 0200 - - - - 97 %   18 0156 112/68 - - - 97 %   18 0130 - - - - 96 %   18 0120 99/52 - - - 98 %   18 0050 101/55 - - - 99 %   18 0045 107/57 - - - 98 %   18 0040 110/62 - - - 99 %   18 0035 112/61 - - - -   18 0030 122/57 98.1  F (36.7  C) Oral - 100 %   18 0028 123/56 - - - -   18 0026 122/67 - - - -   18 0025 - - - - 100 %   18 0024 106/56 - - - -   18 0022 112/54 - - - -   18 0020 111/56 - - - 100 %   18 0018 123/58 - - - -   18 1945 124/76 98  F (36.7  C) Oral 18 -     Gen: lying in bed, NAD    Cx: 8-9/90/-1    EFM: 130 BPM, moderate clare, accels present, occasional variable decelerations  Contractions: q3-4 minutes    A/P  37 year old  at 39w6d by 10w1d US here for labor augmentation, now active labor but slowed progress    #) IOL  Slight progress, although slow. Continue Pitocin. IUPC in place.     #) Fetal wellbeing  Primarily category I reactive other than rare variable decelerations.     Florinda Yuen, PGY3  OB/Gyn  2018, 3:22 AM

## 2018-02-18 NOTE — DISCHARGE SUMMARY
[facility] Discharge Summary    Allyn Mcqueen MRN# [id number]   Age: 37 year old YOB: 1980     Date of Admission:  2018  Date of Discharge:  2018  Admitting Physician:  Sally Henderson MD  Discharge Physician:  Katina Sommers MD    Admit Dx:   -  at 39w6d   - Dichorionic Diamniotic twin pregnancy w/ demise of second twin in first trimester  - Spontaneous labor    Discharge Dx:  -  s/p delivery as noted below  - Dichorionic Diamniotic twin pregnancy w/ demise of second twin in first trimester  - Spontaneous labor    Procedures:  - Spontaneous vaginal delivery    Admit HPI/Labor Course:  Allyn Mcqueen, a 37 year old  who presented at 39w6d in early labor.  She underwent labor augmentation with AROM and IV pitocin. She progressed to complete and started pushing. She then delivered a vigorous baby boy in KIARA positioning.  Apgars 9 and 9. Cord clamping was delayed for 1 minute.  The cord was then doubly clamped and cut.  A cord segment and cord blood was collected. An intact placenta was delivered at  12:38 PM . Placental disposition was Hospital disposal . Small amount of uterine atony that resolved with fundal massage and sublingual misoprostol. Fundus then found to be firm. There was noted to be a small second degree laceration that was repaired in the usual fashion with 3-0 Vicryl.   mL. Needle count correct. Infant and patient in delivery room in good and stable condition.    Please see her Admission H&P and Delivery Summary for further details.    Postpartum Course:  Her postpartum course was uncomplicated. On PPD#2, she was meeting all of her postpartum goals and deemed stable for discharge. She was voiding without difficulty, tolerating a regular diet without nausea and vomiting, her pain was well controlled on oral pain medicines and her lochia was appropriate. Her hemoglobin prior to delivery was 12.6 and after delivery was  8.5. She will be discharged home with iron. Her Rh status was positive, and Rhogam was not indicated.     # Discharge Pain Plan:  - During her hospitalization, Allyn experienced pain due to spontaneous vaginal delivery.  The pain plan for discharge was discussed with Allyn and the plan was created in a collaborative fashion.    - Pharmacologic adjuvants:  NSAIDs and Acetaminophen    Discharge Medications:     Review of your medicines      START taking       Dose / Directions    acetaminophen 325 MG tablet   Commonly known as:  TYLENOL        Dose:  650 mg   Take 2 tablets (650 mg) by mouth every 4 hours as needed for mild pain or fever (greater than or equal to 38  C /100.4  F (oral) or 38.5  C/ 101.4  F (core).)   Quantity:  60 tablet   Refills:  0       ferrous sulfate 325 (65 FE) MG tablet   Commonly known as:  IRON        Dose:  325 mg   Take 1 tablet (325 mg) by mouth daily   Quantity:  30 tablet   Refills:  2       ibuprofen 600 MG tablet   Commonly known as:  ADVIL/MOTRIN        Dose:  600 mg   Take 1 tablet (600 mg) by mouth every 6 hours as needed for moderate pain   Quantity:  60 tablet   Refills:  0       senna-docusate 8.6-50 MG per tablet   Commonly known as:  SENOKOT-S;PERICOLACE        Dose:  1 tablet   Take 1 tablet by mouth 2 times daily as needed for constipation   Quantity:  100 tablet   Refills:  0            Where to get your medicines      These medications were sent to [place], [place] - [address]  [address] [address]     Phone:  [phone]      acetaminophen 325 MG tablet     ferrous sulfate 325 (65 FE) MG tablet     ibuprofen 600 MG tablet     senna-docusate 8.6-50 MG per tablet           Discharge/Disposition:  Allyn Mcqueen was discharged to home in stable condition with the following instructions/medications:  1) Call for temperature > 100.4, bright red vaginal bleeding >1 pad an hour x 2 hours, foul smelling vaginal  discharge, pain not controlled by usual oral pain meds, persistent nausea and vomiting not controlled on medications  2) She was undecided about contraception.  3) For feeding she decided to breast feed.  4) She was instructed to follow-up with her primary OB in 6 weeks for a routine postpartum visit.    Sofiya Mederos MD  Ob/Gyn, PGY2      I, Sally Henderson MD, personally saw and evaluated this patient.  I discussed the patient with the resident and care team, and agree with the assessment and plan of care as documented in the resident's note of 02/20/18.  I personally reviewed vital signs, medications, lab, and exam.     Key Findings:  Meeting goals for discharge.  Abdomen non-tender, fundus firm.       PLAN:  Discharge home.     Sally Henderson MD   Date of Service (when I saw the patient) 02/20/18   '

## 2018-02-18 NOTE — ANESTHESIA PROCEDURE NOTES
Epidural Procedure Note    Staff:     Anesthesiologist:  AUGUSTIN ELDER  Location: OB     Procedure start time:  2/18/2018 12:05 AM     Procedure end time:  2/18/2018 12:31 AM   Pre-procedure checklist:   patient identified, IV checked, site marked, risks and benefits discussed, informed consent, monitors and equipment checked, pre-op evaluation, at physician/surgeon's request and post-op pain management      Correct Patient: Yes      Correct Position: Yes      Correct Site: Yes      Correct Procedure: Yes      Correct Laterality:  Yes    Site Marked:  Yes  Procedure:     Procedure:  Epidural catheter    ASA:  2    Diagnosis:  Active labor    Position:  Sitting    Sterile Prep: chloraprep, mask, sterile gloves and patient draped      Insertion site:  L4-5    Local skin infiltration:  1% lidocaine    amount (mL):  3    Approach:  Midline    Needle gauge (G):  17    Needle Length (in):  3.5    Block Needle Type:  Touhy    Injection Technique:  LORT saline    ANAHI at (cm):  5.5    Attempts:  1    Redirects:  1    Catheter gauge (G):  19    Catheter threaded easily: Yes      Threaded to cm at skin:  10    Threaded in epidural space (cm):  4.5    Paresthesias:  No    Aspiration negative for Heme or CSF: Yes      Test dose (mL):  3     Local anesthetic:  Lidocaine 1.5% w/ 1:200,000 epinephrine    Test dose time:  00:18    Test dose negative for signs of intravascular, subdural or intrathecal injection: Yes

## 2018-02-18 NOTE — PLAN OF CARE
Problem: Patient Care Overview  Goal: Plan of Care/Patient Progress Review  Labor Shift Note  Data: Contraction frequency q 2-3.5 minutes, palpate moderate. Fetal assessment category two.   Vitals:    18 0430 18 0515 18 0530 18 0600   BP: 129/64 126/66     Resp:       Temp:  98.5  F (36.9  C)     TempSrc:  Oral     SpO2: 97% 97% 96% 96%   . I/O this shift:  In: -   Out: 100 [Urine:100].  Leaking moderate amounts of clear fluid.  Signs and symptoms of infection absent.  Blood pressures stable. Signs and symptoms of pre-eclampsia: absent.  Support person Jesus present.  Interventions: Continue uterine/fetal assessment continuous. Vital Signs per order set. Comfort measures repositioned, epidural, peanut ball.  Medicated for Epidural.  Plan: Anticipate . Provide labor/coping assistance as needed by patient and support person.  Observe for and notify care provider of indications of progressing labor, need for pain medications,  or signs of fetal/maternal compromise.

## 2018-02-18 NOTE — PLAN OF CARE
Problem: Labor (Cervical Ripen, Induct, Augment) (Adult,Obstetrics,Pediatric)  Goal: Signs and Symptoms of Listed Potential Problems Will be Absent, Minimized or Managed (Labor)  Signs and symptoms of listed potential problems will be absent, minimized or managed by discharge/transition of care (reference Labor (Cervical Ripen, Induct, Augment) (Adult,Obstetrics,Pediatric) CPG).   Data: Allyn Mcuqeen transferred to [id number] via wheelchair at 1500. Baby transferred via parent's arms.  Action: Receiving unit notified of transfer: Yes. Patient and family notified of room change. Report given to OMEGA Damon at 1520. Belongings sent to receiving unit. Accompanied by Registered Nurse. Oriented patient to surroundings. Call light within reach. ID bands double-checked with receiving RN.  Response: Patient tolerated transfer and is stable.

## 2018-02-18 NOTE — PROGRESS NOTES
Labor progress note  2018, 3:18 AM     S: Allyn now has an epidural and is generally comfortable.     O:   Patient Vitals for the past 12 hrs:   BP Temp Temp src Resp SpO2   18 0230 - - - - 96 %   18 0200 - - - - 97 %   18 0156 112/68 - - - 97 %   18 0130 - - - - 96 %   18 0120 99/52 - - - 98 %   18 0050 101/55 - - - 99 %   18 0045 107/57 - - - 98 %   18 0040 110/62 - - - 99 %   18 0035 112/61 - - - -   18 0030 122/57 98.1  F (36.7  C) Oral - 100 %   18 0028 123/56 - - - -   18 0026 122/67 - - - -   18 0025 - - - - 100 %   18 0024 106/56 - - - -   18 0022 112/54 - - - -   18 0020 111/56 - - - 100 %   18 0018 123/58 - - - -   18 1945 124/76 98  F (36.7  C) Oral 18 -     Gen: lying in bed, NAD    Cx: 8/90/-1    EFM: 130 BPM, moderate clare, accels present, occasional variable decelerations  Contractions: q3-4 minutes    A/P  37 year old  at 39w6d by 10w1d US here for labor augmentation, now active labor but slowed progress    #) IOL  Slowed progress at 8 cm. Will start Pitocin for further augmentation. If no change, IUPC to determine arrest disorder.     #) Fetal wellbeing  Primarily category I reactive other than rare variable decelerations.     Florinda Yuen, PGY3  OB/Gyn  2018, 3:22 AM

## 2018-02-18 NOTE — PROVIDER NOTIFICATION
02/17/18 2207   Provider Notification   Provider Name/Title Dr Yuen   Method of Notification At Bedside   Notification Reason Membrane Status;SVE   Dr yuen at bedside to perform AROM and SVE. PT tolerated well. Clear fluid. Pt now 6/90/-1.

## 2018-02-18 NOTE — ANESTHESIA PREPROCEDURE EVALUATION
Anesthesia Evaluation     . Pt has had prior anesthetic. Type: Regional and General           ROS/MED HX    ENT/Pulmonary:  - neg pulmonary ROS     Neurologic:  - neg neurologic ROS     Cardiovascular:  - neg cardiovascular ROS       METS/Exercise Tolerance:  >4 METS   Hematologic:  - neg hematologic  ROS       Musculoskeletal:  - neg musculoskeletal ROS       GI/Hepatic:     (+) GERD Symptomatic,       Renal/Genitourinary:  - ROS Renal section negative       Endo:  - neg endo ROS       Psychiatric:  - neg psychiatric ROS       Infectious Disease:  - neg infectious disease ROS       Malignancy:      - no malignancy   Other:    (+) Possibly pregnant   - neg other ROS                 Physical Exam  Normal systems: cardiovascular, pulmonary and dental    Airway   Mallampati: II  TM distance: >3 FB  Neck ROM: full    Dental     Cardiovascular   Rhythm and rate: regular and normal      Pulmonary    breath sounds clear to auscultation                    Anesthesia Plan      History & Physical Review  History and physical reviewed and following examination; no interval change.    ASA Status:  2 emergent.    NPO Status:  Full stomach    Plan for Epidural with Other induction. Maintenance will be Other.           Postoperative Care  Postoperative pain management:  Neuraxial analgesia.      Consents  Anesthetic plan, risks, benefits and alternatives discussed with:  Patient..

## 2018-02-18 NOTE — PROGRESS NOTES
Good pain relief with epidural.  Cx 9+ per Dr. Yuen.  Regular ctx.  FHTs 130s, moderate variability, +accels, intermittent variables.  Continues slow progress in labor.  Anticipate , will continue to monitor fetus.

## 2018-02-19 LAB — HGB BLD-MCNC: 8.5 G/DL (ref 11.7–15.7)

## 2018-02-19 PROCEDURE — 36415 COLL VENOUS BLD VENIPUNCTURE: CPT | Performed by: OBSTETRICS & GYNECOLOGY

## 2018-02-19 PROCEDURE — 25000132 ZZH RX MED GY IP 250 OP 250 PS 637: Performed by: OBSTETRICS & GYNECOLOGY

## 2018-02-19 PROCEDURE — 12000028 ZZH R&B OB UMMC

## 2018-02-19 PROCEDURE — 85018 HEMOGLOBIN: CPT | Performed by: OBSTETRICS & GYNECOLOGY

## 2018-02-19 RX ORDER — FERROUS SULFATE 325(65) MG
325 TABLET ORAL DAILY
Qty: 30 TABLET | Refills: 2 | Status: SHIPPED | OUTPATIENT
Start: 2018-02-20 | End: 2018-04-18

## 2018-02-19 RX ORDER — AMOXICILLIN 250 MG
1 CAPSULE ORAL 2 TIMES DAILY PRN
Qty: 100 TABLET | Refills: 0 | Status: SHIPPED | OUTPATIENT
Start: 2018-02-19 | End: 2018-08-21

## 2018-02-19 RX ORDER — FERROUS SULFATE 325(65) MG
325 TABLET ORAL DAILY
Status: DISCONTINUED | OUTPATIENT
Start: 2018-02-19 | End: 2018-02-20 | Stop reason: HOSPADM

## 2018-02-19 RX ADMIN — ACETAMINOPHEN 650 MG: 325 TABLET, FILM COATED ORAL at 18:55

## 2018-02-19 RX ADMIN — SENNOSIDES AND DOCUSATE SODIUM 1 TABLET: 8.6; 5 TABLET ORAL at 18:55

## 2018-02-19 RX ADMIN — IBUPROFEN 800 MG: 800 TABLET, FILM COATED ORAL at 02:09

## 2018-02-19 RX ADMIN — ACETAMINOPHEN 650 MG: 325 TABLET, FILM COATED ORAL at 00:41

## 2018-02-19 RX ADMIN — ACETAMINOPHEN 650 MG: 325 TABLET, FILM COATED ORAL at 05:31

## 2018-02-19 RX ADMIN — ACETAMINOPHEN 650 MG: 325 TABLET, FILM COATED ORAL at 13:53

## 2018-02-19 RX ADMIN — IBUPROFEN 800 MG: 800 TABLET, FILM COATED ORAL at 21:19

## 2018-02-19 RX ADMIN — IBUPROFEN 800 MG: 800 TABLET, FILM COATED ORAL at 15:42

## 2018-02-19 RX ADMIN — FERROUS SULFATE TAB 325 MG (65 MG ELEMENTAL FE) 325 MG: 325 (65 FE) TAB at 09:37

## 2018-02-19 RX ADMIN — IBUPROFEN 800 MG: 800 TABLET, FILM COATED ORAL at 08:43

## 2018-02-19 RX ADMIN — SENNOSIDES AND DOCUSATE SODIUM 1 TABLET: 8.6; 5 TABLET ORAL at 08:43

## 2018-02-19 RX ADMIN — ACETAMINOPHEN 650 MG: 325 TABLET, FILM COATED ORAL at 23:44

## 2018-02-19 RX ADMIN — ACETAMINOPHEN 650 MG: 325 TABLET, FILM COATED ORAL at 09:37

## 2018-02-19 NOTE — PLAN OF CARE
Problem: Postpartum (Vaginal Delivery) (Adult,Obstetrics,Pediatric)  Goal: Signs and Symptoms of Listed Potential Problems Will be Absent, Minimized or Managed (Postpartum)  Signs and symptoms of listed potential problems will be absent, minimized or managed by discharge/transition of care (reference Postpartum (Vaginal Delivery) (Adult,Obstetrics,Pediatric) CPG).  Outcome: Improving  Postpartum stable. Voiding adequate amounts. Tolerating diet. Ambulating independently. Breasts are WNL. Hot packs, ibuprofen, tylenol given for pain. Ice packs and tucks for aleah area. PIV SL. Pt plans to bathe tomorrow. Spouse in room for support. Continue plan of care.

## 2018-02-19 NOTE — PLAN OF CARE
Problem: Postpartum (Vaginal Delivery) (Adult,Obstetrics,Pediatric)  Goal: Signs and Symptoms of Listed Potential Problems Will be Absent, Minimized or Managed (Postpartum)  Signs and symptoms of listed potential problems will be absent, minimized or managed by discharge/transition of care (reference Postpartum (Vaginal Delivery) (Adult,Obstetrics,Pediatric) CPG).   Outcome: Improving  Patient vital sign stable and afebrile. Patient complaining of uterine cramping which prn medication and ice/hotpacks decrease pain. Patient reports voiding. Responding and attentive to infant cues. Mother and father appear to be bonding with infant. Patient ambulates around room well. Patient reported to sleeping well. Will continue to monitor and update provider as needed.

## 2018-02-19 NOTE — PLAN OF CARE
Problem: Patient Care Overview  Goal: Plan of Care/Patient Progress Review  Outcome: Therapy, progress toward functional goals as expected  Patient's postpartum assessment WDL. Normal lochia. Patient has uterine cramping especially during feedings. Patient utilizing warm packs. Ibuprofen and Tylenol administered for discomfort. Patient had a Tub soak this morning and verbalized feeling better. Breastfeeding well on demand. Patient is bonding well with infant.

## 2018-02-19 NOTE — PROGRESS NOTES
Post Partum Progress Note  PPD#1    Subjective:  She is resting comfortably in bed this morning. She has no acute complaints this AM. Pain is improving and well controlled on current medication regimen. She is tolerating PO intake. Lochia present and moderate like a menses. She is voiding without difficulty. She has passed flatus and has not had a BM. She is ambulating without dizziness or difficulty.  She denies headache, changes in vision, nausea/vomiting, chest pain, shortness of breath, RUQ pain, or worsening edema.  Plans to breast feed.    Objective:  Vitals:    18 1445 18 1602 18 2031 18 0000   BP: 119/62 119/76 100/69 104/60   Pulse:  92 85 76   Resp:    16   Temp:  98.3  F (36.8  C) 97.7  F (36.5  C) 97.8  F (36.6  C)   TempSrc:  Oral Oral Oral   SpO2: 98% 100%  99%       General: NAD. A&Ox3.  CV: Regular rate, well perfused.   Pulm: Normal respiratory effort.  Abd: Soft, non-tender, non-distended. Fundus is firm and 2 cm below the umbilicus.    Ext: Trace lower extremity edema bilaterally. No calf tenderness.    Assessment/Plan:  Allyn Mcqueen is a 37 year old  female who is PPD#1 s/p , doing well postpartum.    - Encourage routine post-operative goals including ambulation and incentive spirometry  - PNC: Rh positive. Rubella immune. No intervention indicated.  - Pain: Controlled on oral medications  - Heme: Hgb 12.6>>AM Hgb pending.  If <10 will discharge home with iron.  - GI: Continue anti-emetics and stool softeners as needed.  - : Voiding spontaneously.  - Infant: Stable in room with mother  - Feeding: Plans on breast feeding.  - BC: Remains undecided    # Pain Assessment:  Current Pain Score 2018   Patient currently in pain? sleeping: patient not able to self report yes sleeping: patient not able to self report   Pain location - Uterine -   Pain descriptors - Cramping -   - Allyn is experiencing pain due to vaginal delivery.  Pain management was discussed and the plan was created in a collaborative fashion.  Allyn's response to the current recommendations: compliant  - Please see the plan for pain management as documented above    Discharge to home on PPD#2 when meeting postpartum goals    Sophie Moreira MD  OBGYN PGY-3  [phone]  6:46 AM 2/19/2018

## 2018-02-20 VITALS
TEMPERATURE: 97.9 F | SYSTOLIC BLOOD PRESSURE: 112 MMHG | DIASTOLIC BLOOD PRESSURE: 81 MMHG | OXYGEN SATURATION: 99 % | RESPIRATION RATE: 16 BRPM | HEART RATE: 83 BPM

## 2018-02-20 PROCEDURE — 25000132 ZZH RX MED GY IP 250 OP 250 PS 637: Performed by: OBSTETRICS & GYNECOLOGY

## 2018-02-20 RX ADMIN — IBUPROFEN 800 MG: 800 TABLET, FILM COATED ORAL at 04:10

## 2018-02-20 RX ADMIN — IBUPROFEN 800 MG: 800 TABLET, FILM COATED ORAL at 10:57

## 2018-02-20 RX ADMIN — SENNOSIDES AND DOCUSATE SODIUM 1 TABLET: 8.6; 5 TABLET ORAL at 08:21

## 2018-02-20 RX ADMIN — ACETAMINOPHEN 650 MG: 325 TABLET, FILM COATED ORAL at 08:20

## 2018-02-20 RX ADMIN — FERROUS SULFATE TAB 325 MG (65 MG ELEMENTAL FE) 325 MG: 325 (65 FE) TAB at 08:20

## 2018-02-20 RX ADMIN — ACETAMINOPHEN 650 MG: 325 TABLET, FILM COATED ORAL at 04:10

## 2018-02-20 NOTE — PLAN OF CARE
Problem: Patient Care Overview  Goal: Plan of Care/Patient Progress Review  Outcome: Improving  Patient is stable, has mild cramping but well taken cared off with motrin and hot pack. Breastfeeding on demand and left nipple is kind of tender and was encouraged to apply colostrum every after feeding. Will continue with plan of care.

## 2018-02-20 NOTE — PROGRESS NOTES
Post Partum Progress Note  PPD#2    Subjective:  She is resting comfortably in bed this morning. Pain is well controlled on current medication regimen. She is tolerating PO intake. Lochia less than a typical menses. Voiding without difficulty. She is ambulating without dizziness or difficulty.  She denies headache, changes in vision, nausea/vomiting, chest pain, shortness of breath, RUQ pain, or worsening edema. Breastfeeding.     Objective:  Vitals:    18 0000 18 0837 18 1543 18 2130   BP: 104/60 102/75 99/70 120/70   Pulse: 76  83    Resp: 16 16 16 16   Temp: 97.8  F (36.6  C) 97.8  F (36.6  C) 97.9  F (36.6  C) 98  F (36.7  C)   TempSrc: Oral Oral Oral Oral   SpO2: 99%        General: NAD. A&Ox3.  CV: Regular rate, well perfused.   Pulm: Normal respiratory effort.  Abd: Soft, non-tender, non-distended. Fundus is firm and 2 cm below the umbilicus.    Ext: Trace lower extremity edema bilaterally. No calf tenderness.    Assessment/Plan:  Allyn Mcqueen is a 37 year old  female who is PPD#2 s/p , doing well postpartum.    - PNC: Rh positive. Rubella immune. No intervention indicated.  - Pain: Controlled on ibuprofen and tylenol  - Heme: Hgb 12.6>>8.5, will discharge home with iron  - GI: Continue anti-emetics and stool softeners as needed.  - : Voiding spontaneously.  - Infant: Stable in room with mother, fussy overnight  - Feeding: breast  - BC: Remains undecided    # Pain Assessment:  - Allyn is experiencing pain due to vaginal delivery. Pain management was discussed and the plan was created in a collaborative fashion.  Allyn's response to the current recommendations: compliant  - Please see the plan for pain management as documented above    Discharge to home today    Sofiya Mederos MD  Ob/Gyn, PGY2      I, Sally Henderson MD, personally saw and evaluated this patient.  I discussed the patient with the resident and care team, and agree with the assessment and plan of  care as documented in the resident's note of 02/20/18.  I personally reviewed vital signs, medications, lab, and exam.     Key Findings:  Meeting goals for discharge.  Abdomen non-tender, fundus firm.  Pain well-controlled with ibuprofen/tylenol.      PLAN:  Discharge home.  RTC 6 weeks.     Sally Henderson MD   Date of Service (when I saw the patient) 02/20/18   '

## 2018-02-20 NOTE — LACTATION NOTE
This note was copied from a baby's chart.  Met with Allyn and Jesus due to concerns of tight frenulum by bedside RN. Baby Jean is there second baby. Allyn  their first son for about 2 years; he is now 2.5 years. She reports having an oversupply at times and occasional plugged ducts. She has no significant health history, noted breast changes in pregnancy and is easily able to hand express colostrum. Her breasts are feeling matute between feedings today and Jean can be heard swallowing at the breast. Jean was breastfeeding during this visit. Allyn denied any discomfort/pinching while Jean breastfeeds. She is independently able to achieve a comfortable latch. When Jean comes of the breast her nipple is rounded. She reports some very mild nipple discomfort between feedings. . Her nipples are intact and she is using colostrum on her nipples. Upon oral exam, Jean is able to bring his tongue out to his lower lip and his tongue becomes slightly heart shaped when extended. When he sucks on my finger he brings his tongue forward and has a coordinated suck. His weight is down 4.3% and he has age appropriate output. Family plans to follow up at [facility] and Allyn has a breast pump at home.   I reviewed findings with Pediatrician, Dr. Keen. With the family I reviewed on-demand feedings, early feeding cues, signs feedings are going well, potential impacts of tight frenulum on mom's comfort, milk supply and baby's ability to remove milk from the breast, feeding log, management of plugged ducts, and  outpatient lactation resources.  I encouraged to Allyn to follow up with Dr. Asa MD, IBCLC at [facility] if she develops soreness when feeding, has concerns about milk supply or Jean's ability to remove milk from the breast. Dr. Keen will re-evaluate frenulum during exam today.

## 2018-02-20 NOTE — PLAN OF CARE
Problem: Patient Care Overview  Goal: Plan of Care/Patient Progress Review  Outcome: Adequate for Discharge Date Met: 02/20/18  Patient is stable and breastfeeding independently. Cramping and perineal discomfort is well taken cared of with motrin and tylenol. She is also using tucks and helping. She is discharge to home today with baby and went to discharge class and reviewed with her.

## 2018-02-20 NOTE — PLAN OF CARE
Problem: Patient Care Overview  Goal: Plan of Care/Patient Progress Review  Outcome: Improving  Data: Vital signs within normal limits. Postpartum checks within normal limits - see flow record. Patient eating and drinking normally. Patient able to empty bladder independently and is up ambulating. Patient performing self cares and is able to care for infant.  Action: Patient medicated during the shift for uterine cramping.   Response: Positive attachment behaviors observed with infant.   Will continue to monitor and provide support.

## 2018-02-22 ENCOUNTER — OFFICE VISIT (OUTPATIENT)
Dept: OBGYN | Facility: CLINIC | Age: 38
End: 2018-02-22
Payer: COMMERCIAL

## 2018-02-22 VITALS
BODY MASS INDEX: 28.6 KG/M2 | DIASTOLIC BLOOD PRESSURE: 77 MMHG | WEIGHT: 167.5 LBS | SYSTOLIC BLOOD PRESSURE: 123 MMHG | HEIGHT: 64 IN | HEART RATE: 104 BPM | TEMPERATURE: 98.2 F

## 2018-02-22 DIAGNOSIS — N76.2 VULVAR CELLULITIS: Primary | ICD-10-CM

## 2018-02-22 PROCEDURE — 99024 POSTOP FOLLOW-UP VISIT: CPT | Performed by: OBSTETRICS & GYNECOLOGY

## 2018-02-22 RX ORDER — CEPHALEXIN 500 MG/1
500 CAPSULE ORAL 4 TIMES DAILY
Qty: 40 CAPSULE | Refills: 0 | Status: SHIPPED | OUTPATIENT
Start: 2018-02-22 | End: 2018-03-12

## 2018-02-22 NOTE — PROGRESS NOTES
"Chief Complaint   Patient presents with     Wound Check       Initial /77 (BP Location: Left arm, Patient Position: Sitting, Cuff Size: Adult Regular)  Pulse 104  Temp 98.2  F (36.8  C) (Oral)  Ht 5' 4\" (1.626 m)  Wt 167 lb 8 oz (76 kg)  LMP 05/15/2017  Breastfeeding? Yes  BMI 28.75 kg/m2 Estimated body mass index is 28.75 kg/(m^2) as calculated from the following:    Height as of this encounter: 5' 4\" (1.626 m).    Weight as of this encounter: 167 lb 8 oz (76 kg).  BP completed using cuff size: regular        The following HM Due: NONE      The following patient reported/Care Every where data was sent to:  P ABSTRACT QUALITY INITIATIVES [57231]  n/a      n/a and patient has appointment for today            SUBJECTIVE:  Allyn Mcqueen is a 37 year old female  who is 4 days postpartum. Here for assessment of vaginal stitches.   She had an uncomplicated   delivering a healthy baby at term.  Vulva stated hurting more around the time of discharge ~ 2 days ago.  Tried sitz baths which helps some but worried that her stitches are coming out.  Feels sore, swollen and painful.       OBJECTIVE:  Blood pressure 123/77, pulse 104, temperature 98.2  F (36.8  C), temperature source Oral, height 5' 4\" (1.626 m), weight 167 lb 8 oz (76 kg), last menstrual period 05/15/2017, currently breastfeeding.   General - pleasant female in no acute distress.  normal respiratory and cardiovascular effort   Pelvic - EG: normal adult female, there is a perineal laceration that has slight purulent d/c the tear is  about 2 mm and the stitch is noted to be intact. There is induration and tenderness R side > left side.   BUS: within normal limits, Vagina: well rugated, scant bloody discharge,    Anus: small hemorrhoid, non swollen    ASSESSMENT:  Postpartum vulvar cellulitis at laceration site  Stitches intact    PLAN:  Discussed epsom salt bath  Keflex per orders ---  has questionable allergy to PCN but " reports that she got that with her first delivery for GBS and did ok.   F/u if no improvement in 48 hrs.      Chastity Wade MD

## 2018-02-22 NOTE — MR AVS SNAPSHOT
"              After Visit Summary   2/22/2018    Allyn Mcqueen    MRN: [id number]           Patient Information     Date Of Birth          1980        Visit Information        Provider Department      2/22/2018 2:15 PM Chastity Wade MD [facility] [place]        Today's Diagnoses     Vulvar cellulitis    -  1    Postpartum complication           Follow-ups after your visit        Who to contact     If you have questions or need follow up information about today's clinic visit or your schedule please contact [facility] directly at [phone].  Normal or non-critical lab and imaging results will be communicated to you by Ecelles Carsonhart, letter or phone within 4 business days after the clinic has received the results. If you do not hear from us within 7 days, please contact the clinic through Speaktoitt or phone. If you have a critical or abnormal lab result, we will notify you by phone as soon as possible.  Submit refill requests through [employer] or call your pharmacy and they will forward the refill request to us. Please allow 3 business days for your refill to be completed.          Additional Information About Your Visit        MyChart Information     [employer] gives you secure access to your electronic health record. If you see a primary care provider, you can also send messages to your care team and make appointments. If you have questions, please call your primary care clinic.  If you do not have a primary care provider, please call [phone] and they will assist you.        Care EveryWhere ID     This is your Care EveryWhere ID. This could be used by other organizations to access your [facility] medical records  [id number]        Your Vitals Were     Pulse Temperature Height Last Period Breastfeeding? BMI (Body Mass Index)    104 98.2  F (36.8  C) (Oral) 5' 4\" (1.626 m) 05/15/2017 Yes 28.75 kg/m2       Blood Pressure from Last 3 Encounters:   02/22/18 123/77   02/20/18 112/81 "   02/14/18 115/73    Weight from Last 3 Encounters:   02/22/18 167 lb 8 oz (76 kg)   02/14/18 179 lb 3.2 oz (81.3 kg)   02/07/18 179 lb 6.4 oz (81.4 kg)              Today, you had the following     No orders found for display         Today's Medication Changes          These changes are accurate as of 2/22/18  3:09 PM.  If you have any questions, ask your nurse or doctor.               Start taking these medicines.        Dose/Directions    cephALEXin 500 MG capsule   Commonly known as:  KEFLEX   Used for:  Vulvar cellulitis   Started by:  Chastity Wade MD        Dose:  500 mg   Take 1 capsule (500 mg) by mouth 4 times daily   Quantity:  40 capsule   Refills:  0            Where to get your medicines      These medications were sent to [facility] [id number] - [place], [place] - [address] AT [facility] [address]  [address], [place] [address]    Hours:  24-hours Phone:  [phone]     cephALEXin 500 MG capsule                Primary Care Provider Office Phone # Fax #    Noelle Snehal Lundy [phone] [address]  [facility] [address]        Equal Access to Services     JALIL PERKINS AH: Hadii aad ku hadasho Soomaali, waaxda luqadaha, qaybta kaalmada adeegyada, waxay idiin haythelman hermelinda wilson. So [facility] [phone].    ATENCIÓN: Si habla español, tiene a ferrer disposición servicios gratuitos de asistencia lingüística. Llame al [phone].    We comply with applicable federal civil rights laws and [place] laws. We do not discriminate on the basis of race, color, national origin, age, disability, sex, sexual orientation, or gender identity.            Thank you!     Thank you for choosing [facility]  for your [place]. Our goal is always to provide you with excellent care. Hearing back from our patients is one way we can continue to improve our services. Please take a few minutes to complete the written survey that you may  receive in the mail after your visit with us. Thank you!             Your Updated Medication List - Protect others around you: Learn how to safely use, store and throw away your medicines at www.disposemymeds.org.          This list is accurate as of 18  3:09 PM.  Always use your most recent med list.                   Brand Name Dispense Instructions for use Diagnosis    acetaminophen 325 MG tablet    TYLENOL    60 tablet    Take 2 tablets (650 mg) by mouth every 4 hours as needed for mild pain or fever (greater than or equal to 38? C /100.4? F (oral) or 38.5? C/ 101.4? F (core).)     (normal spontaneous vaginal delivery)       cephALEXin 500 MG capsule    KEFLEX    40 capsule    Take 1 capsule (500 mg) by mouth 4 times daily    Vulvar cellulitis       ferrous sulfate 325 (65 FE) MG tablet    IRON    30 tablet    Take 1 tablet (325 mg) by mouth daily     (normal spontaneous vaginal delivery)       ibuprofen 600 MG tablet    ADVIL/MOTRIN    60 tablet    Take 1 tablet (600 mg) by mouth every 6 hours as needed for moderate pain     (normal spontaneous vaginal delivery)       senna-docusate 8.6-50 MG per tablet    SENOKOT-S;PERICOLACE    100 tablet    Take 1 tablet by mouth 2 times daily as needed for constipation     (normal spontaneous vaginal delivery)

## 2018-02-28 ENCOUNTER — OFFICE VISIT (OUTPATIENT)
Dept: OBGYN | Facility: CLINIC | Age: 38
End: 2018-02-28
Payer: COMMERCIAL

## 2018-02-28 VITALS
HEIGHT: 64 IN | WEIGHT: 161.4 LBS | DIASTOLIC BLOOD PRESSURE: 78 MMHG | SYSTOLIC BLOOD PRESSURE: 114 MMHG | TEMPERATURE: 97.9 F | HEART RATE: 105 BPM | BODY MASS INDEX: 27.55 KG/M2

## 2018-02-28 PROCEDURE — 99207 ZZC POST PARTUM EXAM: CPT | Performed by: OBSTETRICS & GYNECOLOGY

## 2018-02-28 NOTE — MR AVS SNAPSHOT
After Visit Summary   2/28/2018    Allyn Mcqueen    MRN: [id number]           Patient Information     Date Of Birth          1980        Visit Information        Provider Department      2/28/2018 9:45 AM Sabrina Alarcon MD [facility] [place]        Today's Diagnoses     Obstetrical laceration, second degree    -  1       Follow-ups after your visit        Your next 10 appointments already scheduled     Apr 04, 2018 11:45 AM CDT   MyChart OB-GYN Post-Partum with Sabrina Alarcon MD   [facility] [place] ([facility])    [address] [place] [id number] [phone]              Who to contact     If you have questions or need follow up information about today's clinic visit or your schedule please contact [facility] directly at [phone].  Normal or non-critical lab and imaging results will be communicated to you by MyChart, letter or phone within 4 business days after the clinic has received the results. If you do not hear from us within 7 days, please contact the clinic through [email or web address] or phone. If you have a critical or abnormal lab result, we will notify you by phone as soon as possible.  Submit refill requests through [employer] or call your pharmacy and they will forward the refill request to us. Please allow 3 business days for your refill to be completed.          Additional Information About Your Visit        MyChart Information     [employer] gives you secure access to your electronic health record. If you see a primary care provider, you can also send messages to your care team and make appointments. If you have questions, please call your primary care clinic.  If you do not have a primary care provider, please call [phone] and they will assist you.        Care EveryWhere ID     This is your Care EveryWhere ID. This could be used by other organizations to access your [facility]  "records  [id number]        Your Vitals Were     Pulse Temperature Height Breastfeeding? BMI (Body Mass Index)       105 97.9  F (36.6  C) (Oral) 5' 4\" (1.626 m) Yes 27.7 kg/m2        Blood Pressure from Last 3 Encounters:   02/28/18 114/78   02/22/18 123/77   02/20/18 112/81    Weight from Last 3 Encounters:   02/28/18 161 lb 6.4 oz (73.2 kg)   02/22/18 167 lb 8 oz (76 kg)   02/14/18 179 lb 3.2 oz (81.3 kg)              Today, you had the following     No orders found for display       Primary Care Provider Office Phone # Fax #    Noelle Lundy [phone] [address]  [facility] [address]        Equal Access to Services     [facility] : Hadii shwetha simpson Sokristina, waaxda luqadaha, qaybta kaalmada hermelindayamaureen, yuliya cruz . So [facility] [phone].    ATENCIÓN: Si habla español, tiene a ferrer disposición servicios gratuitos de asistencia lingüística. Spring al [phone].    We comply with applicable federal civil rights laws and [place] laws. We do not discriminate on the basis of race, color, national origin, age, disability, sex, sexual orientation, or gender identity.            Thank you!     Thank you for choosing [facility]  for your care. Our goal is always to provide you with excellent care. Hearing back from our patients is one way we can continue to improve our services. Please take a few minutes to complete the written survey that you may receive in the mail after your visit with us. Thank you!             Your Updated Medication List - Protect others around you: Learn how to safely use, store and throw away your medicines at www.disposemymeds.org.          This list is accurate as of 2/28/18  9:43 PM.  Always use your most recent med list.                   Brand Name Dispense Instructions for use Diagnosis    acetaminophen 325 MG tablet    TYLENOL    60 tablet    Take 2 tablets (650 mg) by mouth every 4 hours as needed " for mild pain or fever (greater than or equal to 38? C /100.4? F (oral) or 38.5? C/ 101.4? F (core).)     (normal spontaneous vaginal delivery)       cephALEXin 500 MG capsule    KEFLEX    40 capsule    Take 1 capsule (500 mg) by mouth 4 times daily    Vulvar cellulitis       ferrous sulfate 325 (65 FE) MG tablet    IRON    30 tablet    Take 1 tablet (325 mg) by mouth daily     (normal spontaneous vaginal delivery)       ibuprofen 600 MG tablet    ADVIL/MOTRIN    60 tablet    Take 1 tablet (600 mg) by mouth every 6 hours as needed for moderate pain     (normal spontaneous vaginal delivery)       senna-docusate 8.6-50 MG per tablet    SENOKOT-S;PERICOLACE    100 tablet    Take 1 tablet by mouth 2 times daily as needed for constipation     (normal spontaneous vaginal delivery)

## 2018-03-01 NOTE — PROGRESS NOTES
"S:  Allyn presents for check on vaginal laceration.   Delivered on  with small second degree laceration. Was seen on  with apparent wound infection and started on keflex.   Yesterday had increase in pain and worried it is opening.    O:  Vitals:    18 0956   BP: 114/78   Pulse: 105   Temp: 97.9  F (36.6  C)   TempSrc: Oral   Weight: 161 lb 6.4 oz (73.2 kg)   Height: 5' 4\" (1.626 m)     Gen: well appearing  : second degree laceration has broken down, stitches are visible and loose. No erythema or induration. Healthy granulation tissue.     Procedure: obstetrical laceration repair.   The area was cleansed with betadine and injected with lidocaine. It was reapproximated with 4-0 vicryl in standard fashion for second degree laceration.     A/P:  37 year old  with wound breakdown of second degree laceration  - No current signs of infection  - Discussed primary closure versus healing by secondary intent.   - Patient decided to proceed with closure, which was accomplished without difficulty.   - RTC one week  "

## 2018-03-08 ENCOUNTER — PRENATAL OFFICE VISIT (OUTPATIENT)
Dept: OBGYN | Facility: CLINIC | Age: 38
End: 2018-03-08
Payer: COMMERCIAL

## 2018-03-08 VITALS
SYSTOLIC BLOOD PRESSURE: 124 MMHG | HEART RATE: 94 BPM | WEIGHT: 161.38 LBS | OXYGEN SATURATION: 100 % | DIASTOLIC BLOOD PRESSURE: 80 MMHG | BODY MASS INDEX: 27.55 KG/M2 | HEIGHT: 64 IN

## 2018-03-08 PROCEDURE — 99024 POSTOP FOLLOW-UP VISIT: CPT | Performed by: OBSTETRICS & GYNECOLOGY

## 2018-03-08 NOTE — PROGRESS NOTES
"S: Allyn Mcqueen is a 37 year old  who is 3 weeks postpartum from vaginal delivery with second degree lac.  She was seen about a week after delivery and found to have an infection in the wound and was given Abx.  She returned one week ago and was found to have had her wound completely open.  It did not appear infected, so was re-closed in the office.  She reports feeling much better.  Minimal pain, but the pain she has feels like it is \"healing normally.\" She denies f/c.  She denies drainage or bleeding from the wound.    O: /80  Pulse 94  Ht 5' 4\" (1.626 m)  Wt 161 lb 6 oz (73.2 kg)  SpO2 100%  BMI 27.7 kg/m2    Gen: NAd  Abd: SNT  Pelvic: normal appearing vulva.  Second degree lac healing well, suture visible.  No erythema or induration  Ex; no c/c/e    A/p: Laceration healing well   Continue monitoring progress and abstain from strenuous exercise until healed.  RTC for 6wk check, earlier if needed.    FRANK QUAN MD       "

## 2018-03-08 NOTE — MR AVS SNAPSHOT
After Visit Summary   3/8/2018    Allyn Mcqueen    MRN: [id number]           Patient Information     Date Of Birth          1980        Visit Information        Provider Department      3/8/2018 11:15 AM Mireille Figueroa MD [facility] [place]        Today's Diagnoses     Breakdown of perineum in the puerperium    -  1       Follow-ups after your visit        Your next 10 appointments already scheduled     Apr 18, 2018 10:00 AM CDT   MyChart OB-GYN Post-Partum with Mireille Figueroa MD   [facility] ([facility])    [address] [place] [address] [phone]              Who to contact     If you have questions or need follow up information about today's clinic visit or your schedule please contact [facility] directly at [phone].  Normal or non-critical lab and imaging results will be communicated to you by MyChart, letter or phone within 4 business days after the clinic has received the results. If you do not hear from us within 7 days, please contact the clinic through O-RIDhart or phone. If you have a critical or abnormal lab result, we will notify you by phone as soon as possible.  Submit refill requests through [employer] or call your pharmacy and they will forward the refill request to us. Please allow 3 business days for your refill to be completed.          Additional Information About Your Visit        MyChart Information     [employer] gives you secure access to your electronic health record. If you see a primary care provider, you can also send messages to your care team and make appointments. If you have questions, please call your primary care clinic.  If you do not have a primary care provider, please call [phone] and they will assist you.        Care EveryWhere ID     This is your Care EveryWhere ID. This could be used by other organizations to access your [facility]  "records  [id number]        Your Vitals Were     Pulse Height Pulse Oximetry BMI (Body Mass Index)          94 5' 4\" (1.626 m) 100% 27.7 kg/m2         Blood Pressure from Last 3 Encounters:   03/08/18 124/80   02/28/18 114/78   02/22/18 123/77    Weight from Last 3 Encounters:   03/08/18 161 lb 6 oz (73.2 kg)   02/28/18 161 lb 6.4 oz (73.2 kg)   02/22/18 167 lb 8 oz (76 kg)              Today, you had the following     No orders found for display       Primary Care Provider Office Phone # Fax #    Noelle Lundy [phone] [address] [facility] [address]        Equal Access to Services     JALIL PERKINS : Hadii shwetha simpson Sokristina, waaxda luqadaha, qaybta kaalmada adeegyada, yuliya cruz . So [facility] [phone].    ATENCIÓN: Si habla español, tiene a ferrer disposición servicios gratuitos de asistencia lingüística. Spring al [phone].    We comply with applicable federal civil rights laws and [place] laws. We do not discriminate on the basis of race, color, national origin, age, disability, sex, sexual orientation, or gender identity.            Thank you!     Thank you for choosing [facility]  for your care. Our goal is always to provide you with excellent care. Hearing back from our patients is one way we can continue to improve our services. Please take a few minutes to complete the written survey that you may receive in the mail after your visit with us. Thank you!             Your Updated Medication List - Protect others around you: Learn how to safely use, store and throw away your medicines at www.disposemymeds.org.          This list is accurate as of 3/8/18 11:44 AM.  Always use your most recent med list.                   Brand Name Dispense Instructions for use Diagnosis    acetaminophen 325 MG tablet    TYLENOL    60 tablet    Take 2 tablets (650 mg) by mouth every 4 hours as needed for mild pain or fever (greater than " or equal to 38? C /100.4? F (oral) or 38.5? C/ 101.4? F (core).)     (normal spontaneous vaginal delivery)       cephALEXin 500 MG capsule    KEFLEX    40 capsule    Take 1 capsule (500 mg) by mouth 4 times daily    Vulvar cellulitis       ferrous sulfate 325 (65 FE) MG tablet    IRON    30 tablet    Take 1 tablet (325 mg) by mouth daily     (normal spontaneous vaginal delivery)       ibuprofen 600 MG tablet    ADVIL/MOTRIN    60 tablet    Take 1 tablet (600 mg) by mouth every 6 hours as needed for moderate pain     (normal spontaneous vaginal delivery)       senna-docusate 8.6-50 MG per tablet    SENOKOT-S;PERICOLACE    100 tablet    Take 1 tablet by mouth 2 times daily as needed for constipation     (normal spontaneous vaginal delivery)

## 2018-03-12 ENCOUNTER — OFFICE VISIT (OUTPATIENT)
Dept: OBGYN | Facility: CLINIC | Age: 38
End: 2018-03-12
Payer: COMMERCIAL

## 2018-03-12 VITALS
SYSTOLIC BLOOD PRESSURE: 111 MMHG | HEART RATE: 70 BPM | DIASTOLIC BLOOD PRESSURE: 75 MMHG | HEIGHT: 64 IN | TEMPERATURE: 97.5 F

## 2018-03-12 PROCEDURE — 99024 POSTOP FOLLOW-UP VISIT: CPT | Performed by: OBSTETRICS & GYNECOLOGY

## 2018-03-12 NOTE — PROGRESS NOTES
CC:  Follow-up of obstetrical laceration    Allyn Mcqueen is a 37 year old  who had a vaginal delivery with second degree laceration on 18.  Her post-partum course was complicated by infection of her laceration requiring oral antibiotics and subsequent break down of repair requiring reapproximation under local anesthesia in the clinic.    She called today reporting a slight increase in discomfort of the area and burning with urination.  Reports she looked at the area and it was a little reddened, but didn't appear open.  Denies any drainage or foul odor from the area.  No fevers or chills.  Has not yet resumed sexual activity since delivery.    Patient sitting comfortably on exam table.  NAD.  Laceration repair intact with sutures visible.  Mucosal surface is not yet healed, but sutures are intact.  No drainage, swelling or induration.  No abnormal warmth.    Repair intact without evidence of infection currently.  Discussed continuing to monitor the area and let us know if she has any further concerns (increasing pain, drainage, swelling or clear breakdown of repair.)  Reinforced the importance of abstaining from intercourse until laceration healed.    F/u for routine post-partum visit or prn.    Katina Hernandez MD

## 2018-03-12 NOTE — MR AVS SNAPSHOT
After Visit Summary   3/12/2018    Allyn Mcqueen    MRN: [id number]           Patient Information     Date Of Birth          1980        Visit Information        Provider Department      3/12/2018 3:00 PM Katina Hernandez MD [facility]        Today's Diagnoses     Obstetrical laceration    -  1       Follow-ups after your visit        Your next 10 appointments already scheduled     Apr 18, 2018 10:00 AM CDT   [facility] Mireille Figueroa MD   [facility] ([facility])    [address] [place] [id number] [phone]              Who to contact     If you have questions or need follow up information about today's clinic visit or your schedule please contact [facility] directly at [phone].  Normal or non-critical lab and imaging results will be communicated to you by MyChart, letter or phone within 4 business days after the clinic has received the results. If you do not hear from us within 7 days, please contact the clinic through [employer] or phone. If you have a critical or abnormal lab result, we will notify you by phone as soon as possible.  Submit refill requests through Fonmatch or call your pharmacy and they will forward the refill request to us. Please allow 3 business days for your refill to be completed.          Additional Information About Your Visit        MyChart Information     Fonmatch gives you secure access to your electronic health record. If you see a primary care provider, you can also send messages to your care team and make appointments. If you have questions, please call your primary care clinic.  If you do not have a primary care provider, please call [phone] and they will assist you.        Care EveryWhere ID     This is your Care EveryWhere ID. This could be used by other organizations to access your [facility] medical records  [id number]        Your  "Vitals Were     Pulse Temperature Height Breastfeeding?          70 97.5  F (36.4  C) (Oral) 5' 4\" (1.626 m) Yes         Blood Pressure from Last 3 Encounters:   03/12/18 111/75   03/08/18 124/80   02/28/18 114/78    Weight from Last 3 Encounters:   03/08/18 161 lb 6 oz (73.2 kg)   02/28/18 161 lb 6.4 oz (73.2 kg)   02/22/18 167 lb 8 oz (76 kg)              Today, you had the following     No orders found for display         Today's Medication Changes          These changes are accurate as of 3/12/18  5:44 PM.  If you have any questions, ask your nurse or doctor.               Stop taking these medicines if you haven't already. Please contact your care team if you have questions.     cephALEXin 500 MG capsule   Commonly known as:  KEFLEX   Stopped by:  Katina Hernandez MD                    Primary Care Provider Office Phone # Fax #    Noelle Snehal Lundy [phone] [address] [place] [id number]        Equal Access to Services     [facility]: Hadii shwetha hung hadasho Sokristina, waaxda luqadaha, qaybta kaalmamaureen correa, yuliya cruz . So [facility] [phone].    ATENCIÓN: Si habla español, tiene a ferrer disposición servicios gratuitos de asistencia lingüística. Spring al [phone].    We comply with applicable federal civil rights laws and [place] laws. We do not discriminate on the basis of race, color, national origin, age, disability, sex, sexual orientation, or gender identity.            Thank you!     Thank you for choosing [facility]  for your care. Our goal is always to provide you with excellent care. Hearing back from our patients is one way we can continue to improve our services. Please take a few minutes to complete the written survey that you may receive in the mail after your visit with us. Thank you!             Your Updated Medication List - Protect others around you: Learn how to safely use, store and throw away your " medicines at www.disposemymeds.org.          This list is accurate as of 3/12/18  5:44 PM.  Always use your most recent med list.                   Brand Name Dispense Instructions for use Diagnosis    acetaminophen 325 MG tablet    TYLENOL    60 tablet    Take 2 tablets (650 mg) by mouth every 4 hours as needed for mild pain or fever (greater than or equal to 38? C /100.4? F (oral) or 38.5? C/ 101.4? F (core).)     (normal spontaneous vaginal delivery)       ferrous sulfate 325 (65 FE) MG tablet    IRON    30 tablet    Take 1 tablet (325 mg) by mouth daily     (normal spontaneous vaginal delivery)       ibuprofen 600 MG tablet    ADVIL/MOTRIN    60 tablet    Take 1 tablet (600 mg) by mouth every 6 hours as needed for moderate pain     (normal spontaneous vaginal delivery)       order for DME     1 Device    Equipment being ordered: bilateral electric breast pump    Breast feeding status of mother       senna-docusate 8.6-50 MG per tablet    SENOKOT-S;PERICOLACE    100 tablet    Take 1 tablet by mouth 2 times daily as needed for constipation     (normal spontaneous vaginal delivery)

## 2018-04-18 ENCOUNTER — PRENATAL OFFICE VISIT (OUTPATIENT)
Dept: OBGYN | Facility: CLINIC | Age: 38
End: 2018-04-18
Payer: COMMERCIAL

## 2018-04-18 VITALS
DIASTOLIC BLOOD PRESSURE: 84 MMHG | SYSTOLIC BLOOD PRESSURE: 126 MMHG | TEMPERATURE: 97.7 F | WEIGHT: 162 LBS | BODY MASS INDEX: 27.81 KG/M2 | HEART RATE: 79 BPM

## 2018-04-18 DIAGNOSIS — Z30.430 ENCOUNTER FOR IUD INSERTION: ICD-10-CM

## 2018-04-18 DIAGNOSIS — Z12.4 CERVICAL CANCER SCREENING: ICD-10-CM

## 2018-04-18 DIAGNOSIS — Z78.9 BREASTFED INFANT: ICD-10-CM

## 2018-04-18 DIAGNOSIS — N63.0 LUMP OR MASS IN BREAST: ICD-10-CM

## 2018-04-18 PROBLEM — O09.522 AMA (ADVANCED MATERNAL AGE) MULTIGRAVIDA 35+, SECOND TRIMESTER: Status: RESOLVED | Noted: 2017-08-28 | Resolved: 2018-04-18

## 2018-04-18 PROBLEM — Z23 NEED FOR TDAP VACCINATION: Status: RESOLVED | Noted: 2017-07-07 | Resolved: 2018-04-18

## 2018-04-18 PROBLEM — Z34.82 ENCOUNTER FOR SUPERVISION OF OTHER NORMAL PREGNANCY IN SECOND TRIMESTER: Status: RESOLVED | Noted: 2017-08-28 | Resolved: 2018-04-18

## 2018-04-18 PROBLEM — O31.20X0: Status: RESOLVED | Noted: 2017-07-26 | Resolved: 2018-04-18

## 2018-04-18 LAB — BETA HCG QUAL IFA URINE: NEGATIVE

## 2018-04-18 PROCEDURE — G0145 SCR C/V CYTO,THINLAYER,RESCR: HCPCS | Performed by: OBSTETRICS & GYNECOLOGY

## 2018-04-18 PROCEDURE — 87624 HPV HI-RISK TYP POOLED RSLT: CPT | Performed by: OBSTETRICS & GYNECOLOGY

## 2018-04-18 PROCEDURE — 99207 ZZC POST PARTUM EXAM: CPT | Performed by: OBSTETRICS & GYNECOLOGY

## 2018-04-18 PROCEDURE — 84703 CHORIONIC GONADOTROPIN ASSAY: CPT | Performed by: OBSTETRICS & GYNECOLOGY

## 2018-04-18 PROCEDURE — 99213 OFFICE O/P EST LOW 20 MIN: CPT | Mod: 25 | Performed by: OBSTETRICS & GYNECOLOGY

## 2018-04-18 PROCEDURE — 58300 INSERT INTRAUTERINE DEVICE: CPT | Performed by: OBSTETRICS & GYNECOLOGY

## 2018-04-18 RX ORDER — PRENATAL VIT/IRON FUM/FOLIC AC 27MG-0.8MG
1 TABLET ORAL DAILY
COMMUNITY
End: 2019-10-31

## 2018-04-18 RX ORDER — BREAST PUMP
1 EACH MISCELLANEOUS PRN
Qty: 1 EACH | Refills: 0 | Status: SHIPPED | OUTPATIENT
Start: 2018-04-18 | End: 2019-06-10

## 2018-04-18 NOTE — NURSING NOTE
"Chief Complaint   Patient presents with     Post Partum Exam     due for pap     IUD     insert iud mirena NDC: [id number], LOT: [id number], EXP: 2020       Initial /84  Pulse 79  Temp 97.7  F (36.5  C) (Oral)  Wt 162 lb (73.5 kg)  LMP 2018 (Approximate)  Breastfeeding? Yes  BMI 27.81 kg/m2 Estimated body mass index is 27.81 kg/(m^2) as calculated from the following:    Height as of 3/12/18: 5' 4\" (1.626 m).    Weight as of this encounter: 162 lb (73.5 kg).  BP completed using cuff size: regular        The following HM Due: pap smear      The following patient reported/Care Every where data was sent to:  P ABSTRACT QUALITY INITIATIVES [36038]        patient has appointment for today  [place]                "

## 2018-04-18 NOTE — PATIENT INSTRUCTIONS

## 2018-04-18 NOTE — MR AVS SNAPSHOT
After Visit Summary   4/18/2018    Allyn Mcqueen    MRN: [id number]           Patient Information     Date Of Birth          1980        Visit Information        Provider Department      4/18/2018 10:00 AM Mireille Figueroa MD [facility]        Today's Diagnoses     Cervical cancer screening    -  1    Encounter for IUD insertion         infant          Care Instructions    What Mirena Users May Expect    What to watch for right after Mirena is placed  Some women may experience uterine cramps, bleeding, and/or dizziness during and right after Mirena is placed. To help minimize the cramps, you may taken ibuprofen 600 mg with food prior to your appointment. These symptoms should improve over the next 24 hours.  Mild cramping may be present for a few days after your placement  As a follow up, you should check your strings on 4 weeks or visit your clinic once in the first 4 to 12 weeks after Mirena is placed to make sure it is in the right position. After that, Mirena can be checked once a year as part of your routine exam.    Please use a back-up method (abstinence or condoms) for 5 days after placement.    Your periods may change  For the first 3 to 6 months, your monthly period may become irregular. You may also have frequent spotting or light bleeding. A few women have heavy bleeding during this time. After your body adjusts, the number of bleeding days is likely to decrease (but may remain irregular), and you may even find that your periods stop altogether for as long as Mirena is in place. Around the end of the third month of use, you may see up to a 75% reduction in the amount of menstrual bleeding. By one year, about 1 out of 5 users may hay have no period at all. At the end of two years, 70% have little or no bleeding. Your periods will return rapidly once Mirena is removed.     Mirena Strings  You may check your own Mirena strings by inserting a finger into the  vagina and feeling the strings as they exit the cervix.  The strings will initially feel firm, like fishing line, but will soften over a few weeks.  After the strings have softened, you or your partner should not be able to feel the strings during intercourse.  If you can feel the IUD, see your healthcare provider to have the position confirmed.  You may use tampons with Mirena in place.    Mirena does not protect against HIV or STDs.  Mirena does not prevent the formation of ovarian cysts.  Mirena does not typically reduce acne or cause weight gain or mood changes.    Please call [facility] at [phone] if you have questions or concerns.    For more information:  [email or web address]              Follow-ups after your visit        Who to contact     If you have questions or need follow up information about today's clinic visit or your schedule please contact [facility] directly at [phone].  Normal or non-critical lab and imaging results will be communicated to you by [employer], letter or phone within 4 business days after the clinic has received the results. If you do not hear from us within 7 days, please contact the clinic through [facility] or phone. If you have a critical or abnormal lab result, we will notify you by phone as soon as possible.  Submit refill requests through [employer] or call your pharmacy and they will forward the refill request to us. Please allow 3 business days for your refill to be completed.          Additional Information About Your Visit        [employer] Information     [employer] gives you secure access to your electronic health record. If you see a primary care provider, you can also send messages to your care team and make appointments. If you have questions, please call your primary care clinic.  If you do not have a primary care provider, please call [phone] and they will assist you.        Care EveryWhere ID     This is your Care  EveryWhere ID. This could be used by other organizations to access your [facility] medical records  [id number]        Your Vitals Were     Pulse Temperature Last Period Breastfeeding? BMI (Body Mass Index)       79 97.7  F (36.5  C) (Oral) 03/19/2018 (Approximate) Yes 27.81 kg/m2        Blood Pressure from Last 3 Encounters:   04/18/18 126/84   03/12/18 111/75   03/08/18 124/80    Weight from Last 3 Encounters:   04/18/18 162 lb (73.5 kg)   03/08/18 161 lb 6 oz (73.2 kg)   02/28/18 161 lb 6.4 oz (73.2 kg)              We Performed the Following     Beta HCG qual IFA urine     HPV High Risk Types DNA Cervical     Pap imaged thin layer screen with HPV - recommended age 30 - 65 years (select HPV order below)          Today's Medication Changes          These changes are accurate as of 4/18/18 10:16 AM.  If you have any questions, ask your nurse or doctor.               Start taking these medicines.        Dose/Directions    breast pump Misc   Used for:   infant        Dose:  1 each   1 each as needed   Quantity:  1 each   Refills:  0            Where to get your medicines      Some of these will need a paper prescription and others can be bought over the counter.  Ask your nurse if you have questions.     Bring a paper prescription for each of these medications     breast pump Misc                Primary Care Provider Office Phone # Fax #    Noelle Lundy [phone]       [address]  [facility] [address]        Equal Access to Services     JALIL PERKINS : Terrence simpson Sokristina, waaxda ludavid, qaybta kaalmacole montelongo renan pruitt [facility] wilson. So [facility] [phone].    ATENCIÓN: Si habla español, tiene a ferrer disposición servicios gratuitos de asistencia lingüística. Llame al [phone].    We comply with applicable federal civil rights laws and [place] laws. We do not discriminate on the basis of race, color, national origin, age, disability, sex,  sexual orientation, or gender identity.            Thank you!     Thank you for choosing [facility]  for your care. Our goal is always to provide you with excellent care. Hearing back from our patients is one way we can continue to improve our services. Please take a few minutes to complete the written survey that you may receive in the mail after your visit with us. Thank you!             Your Updated Medication List - Protect others around you: Learn how to safely use, store and throw away your medicines at www.disposemymeds.org.          This list is accurate as of 18 10:16 AM.  Always use your most recent med list.                   Brand Name Dispense Instructions for use Diagnosis    acetaminophen 325 MG tablet    TYLENOL    60 tablet    Take 2 tablets (650 mg) by mouth every 4 hours as needed for mild pain or fever (greater than or equal to 38? C /100.4? F (oral) or 38.5? C/ 101.4? F (core).)     (normal spontaneous vaginal delivery)       breast pump Misc     1 each    1 each as needed     infant       ibuprofen 600 MG tablet    ADVIL/MOTRIN    60 tablet    Take 1 tablet (600 mg) by mouth every 6 hours as needed for moderate pain     (normal spontaneous vaginal delivery)       order for DME     1 Device    Equipment being ordered: bilateral electric breast pump    Breast feeding status of mother       prenatal multivitamin plus iron 27-0.8 MG Tabs per tablet      Take 1 tablet by mouth daily        senna-docusate 8.6-50 MG per tablet    SENOKOT-S;PERICOLACE    100 tablet    Take 1 tablet by mouth 2 times daily as needed for constipation     (normal spontaneous vaginal delivery)

## 2018-04-18 NOTE — PROGRESS NOTES
Allyn is here for a 6-week postpartum checkup.    She had a  of a viable boy, weight 8 pounds 10 oz., with no complications.  She did have a breakdown of her laceration repeair that required re-closure, but that is healing well. Since delivery, she has been breast feeding.  She has no signs of infection, bleeding but has noticed a small had lump in the left breast for the past 3-4weeks.  It has not grown, but never goes away.  She is not pregnant.  We discussed contraceptions and she has chosen Mirena IUD.  She denies feeling sad, depressed or too overwhelmed.    PHQ-9 SCORE 2018   Total Score -   Total Score 2         EXAM:    HEENT: grossly normal.  NECK: no lymphadenopathy or thyroidomegaly.  LUNGS: CTA X 2, no rales or crackles.  Breast: normal for breastfeeding woman bilat.  The left breast has a 1-2mm hard, mobile lump about 3cm above areola at 12:00  BACK: No spinal or CVA tenderness.  HEART: RRR without murmurs clicks or gallops.  ABDOMEN: soft, non tender, good bowel sounds, without masses rebound, guarding or tenderness.    PELVIC:    External genitalia: normal without lesion, repair well healed. Small area of granulation tissue at introitus on left.                            Vagina: normal mucosa and rugae, no discharge.  Cervix: multiparous, well healed, without lesion.  Uterus: non pregnant in size, firm , mobile, no lesions.  Adnexa: non tender, without masses,    EXTREMITIES: Warm to touch, good pulses, no ankle edema or calf tenderness.  NEUROLOGIC: grossly normal.    ASSESSMENT:   Normal 6-week postpartum exam after . Breast lump    PLAN:  Pap smear done and Mirena IUD for contraception. Dx mammo.      FRANK QUAN MD      CC:  IUD insertion  HPI:  Allyn Mcqueen is a 37 year old female Patient's last menstrual period was 2018 (approximate).  Menses are absent since is nursing her 8 week old. No other c/o.  She is here for an IUD insertion. Patient has verbalized  understanding of risks and benefits and has signed the consent form.    Past GYN history:  No STD history       Last PAP smear:  Normal    The patient's past medical history, social history and family history is reviewed at our visit and updated in EPIC.    Allergies: Adhesive tape and Penicillins    Current Outpatient Prescriptions   Medication Sig Dispense Refill     Misc. Devices (BREAST PUMP) MISC 1 each as needed 1 each 0     Prenatal Vit-Fe Fumarate-FA (PRENATAL MULTIVITAMIN PLUS IRON) 27-0.8 MG TABS per tablet Take 1 tablet by mouth daily       acetaminophen (TYLENOL) 325 MG tablet Take 2 tablets (650 mg) by mouth every 4 hours as needed for mild pain or fever (greater than or equal to 38  C /100.4  F (oral) or 38.5  C/ 101.4  F (core).) 60 tablet 0     ibuprofen (ADVIL/MOTRIN) 600 MG tablet Take 1 tablet (600 mg) by mouth every 6 hours as needed for moderate pain 60 tablet 0     order for DME Equipment being ordered: bilateral electric breast pump 1 Device 0     senna-docusate (SENOKOT-S;PERICOLACE) 8.6-50 MG per tablet Take 1 tablet by mouth 2 times daily as needed for constipation 100 tablet 0         ROS:  C: NEGATIVE for fever, chills, change in weight  R: NEGATIVE for significant cough or SOB  CV: NEGATIVE for chest pain, palpitations or peripheral edema  GI: NEGATIVE for nausea, abdominal pain, heartburn, or change in bowel habits  : NEGATIVE for frequency, dysuria, hematuria, vaginal discharge, or irregular bleeding      EXAM:  Blood pressure 126/84, pulse 79, temperature 97.7  F (36.5  C), temperature source Oral, weight 162 lb (73.5 kg), last menstrual period 03/19/2018, currently breastfeeding.  General - pleasant female in no acute distress.  Pelvic - EG: normal adult female, BUS: within normal limits, Vagina: well rugated, no discharge, Cervix: no lesions or CMT, Uterus: firm, normal sized and nontender, Adnexae: no masses or tenderness.    PROCEDURE:  After informed consent was obtained from  the patient, a speculum was placed in the vagina to visualized the cervix.  A pap smear was done.  The cervix was then swabbed with a betadine prep.  Tenaculum was placed at the 12 o'clock position on the cervix and the uterus sounded to 7.5cm.  The Mirena  IUD was then placed in the usual fashion under sterile technique.  Strings were clipped about 3-4 cm from the cervical os.  Tenaculum was removed and cervix was hemostatic. There were no complications. The patient tolerated the procedure well.      ASSESSMENT/PLAN:  1) Contraception, successful IUD placement    The patient should feel for the IUD strings after her next menses.  If unable to locate them, she should return to clinic for a speculum examination for confirmation that the IUD is in place. Bleeding pattern of this particular IUD was discussed with the patient. She is aware that the IUD will need to be removed in 5 years or PRN.  She is to return to clinic for her next annual or PRN.      Mireille Figueroa

## 2018-04-19 ASSESSMENT — PATIENT HEALTH QUESTIONNAIRE - PHQ9: SUM OF ALL RESPONSES TO PHQ QUESTIONS 1-9: 2

## 2018-04-20 LAB
COPATH REPORT: NORMAL
PAP: NORMAL

## 2018-04-25 LAB
FINAL DIAGNOSIS: NORMAL
HPV HR 12 DNA CVX QL NAA+PROBE: NEGATIVE
HPV16 DNA SPEC QL NAA+PROBE: NEGATIVE
HPV18 DNA SPEC QL NAA+PROBE: NEGATIVE
SPECIMEN DESCRIPTION: NORMAL
SPECIMEN SOURCE CVX/VAG CYTO: NORMAL

## 2018-04-26 ENCOUNTER — RADIANT APPOINTMENT (OUTPATIENT)
Dept: MAMMOGRAPHY | Facility: CLINIC | Age: 38
End: 2018-04-26

## 2018-04-26 DIAGNOSIS — N63.0 LUMP OR MASS IN BREAST: ICD-10-CM

## 2018-08-05 ENCOUNTER — MYC MEDICAL ADVICE (OUTPATIENT)
Dept: FAMILY MEDICINE | Facility: CLINIC | Age: 38
End: 2018-08-05

## 2018-08-05 DIAGNOSIS — K21.9 GASTROESOPHAGEAL REFLUX DISEASE WITHOUT ESOPHAGITIS: ICD-10-CM

## 2018-08-05 DIAGNOSIS — Z18.81 RETAINED GLASS FRAGMENT: Primary | ICD-10-CM

## 2018-08-09 NOTE — TELEPHONE ENCOUNTER
"Dr Lundy - Please advise regarding #2  \"2-  I have had on and off heartburn over the past month. I am trying to adjust my diet, which is helping, but Zantac is no longer working. Do you have other recommendations for over the counter that are safe while breastfeeding? \"  "

## 2018-08-20 ENCOUNTER — OFFICE VISIT (OUTPATIENT)
Dept: PODIATRY | Facility: CLINIC | Age: 38
End: 2018-08-20
Payer: COMMERCIAL

## 2018-08-20 VITALS
WEIGHT: 145 LBS | SYSTOLIC BLOOD PRESSURE: 118 MMHG | HEIGHT: 64 IN | BODY MASS INDEX: 24.75 KG/M2 | DIASTOLIC BLOOD PRESSURE: 68 MMHG

## 2018-08-20 DIAGNOSIS — S90.851A FOREIGN BODY IN RIGHT FOOT, INITIAL ENCOUNTER: Primary | ICD-10-CM

## 2018-08-20 PROCEDURE — 10120 INC&RMVL FB SUBQ TISS SMPL: CPT | Mod: RT | Performed by: PODIATRIST

## 2018-08-20 PROCEDURE — 99203 OFFICE O/P NEW LOW 30 MIN: CPT | Mod: 25 | Performed by: PODIATRIST

## 2018-08-20 ASSESSMENT — PAIN SCALES - GENERAL: PAINLEVEL: MILD PAIN (2)

## 2018-08-20 NOTE — LETTER
8/20/2018         RE: Allyn Mcqueen  [address]  [place] [address]        Dear Colleague,    Thank you for referring your patient, Allyn Mcqueen, to the [facility]. Please see a copy of my visit note below.    PATIENT HISTORY:  Allyn Mcqueen is a 37 year old female who presents to clinic for glass in her right heel.  Present for about a month.  She stepped on a broken drinking glass.  Piece is very small according to pt.  2-3/10 pain with pressure, better with rest.  She tried to remove some but couldn't get it all.  Denies infection, bleeding.      I was requested to see this patient for this issue by Noelle Lundy MD.    Review of Systems:  Patient denies fever, chills, rash, stiffness, limping, numbness, weakness, heart burn, blood in stool, chest pain with activity, calf pain when walking, shortness of breath with activity, chronic cough, easy bleeding/bruising, swelling of ankles, excessive thirst, fatigue, depression, anxiety.  Patient admits to wound.     PAST MEDICAL HISTORY:   Past Medical History:   Diagnosis Date     Ovarian cyst rupture 04/2010        PAST SURGICAL HISTORY:   Past Surgical History:   Procedure Laterality Date     C SPINAL FUSION,ANT,EA ADNL LEVEL      fused cervical vertebrae ?c3-4        MEDICATIONS:   Current Outpatient Prescriptions:      acetaminophen (TYLENOL) 325 MG tablet, Take 2 tablets (650 mg) by mouth every 4 hours as needed for mild pain or fever (greater than or equal to 38  C /100.4  F (oral) or 38.5  C/ 101.4  F (core).), Disp: 60 tablet, Rfl: 0     ibuprofen (ADVIL/MOTRIN) 600 MG tablet, Take 1 tablet (600 mg) by mouth every 6 hours as needed for moderate pain, Disp: 60 tablet, Rfl: 0     Misc. Devices (BREAST PUMP) MISC, 1 each as needed, Disp: 1 each, Rfl: 0     order for DME, Equipment being ordered: bilateral electric breast pump, Disp: 1 Device, Rfl: 0     Prenatal Vit-Fe Fumarate-FA (PRENATAL MULTIVITAMIN PLUS IRON) 27-0.8 MG  TABS per tablet, Take 1 tablet by mouth daily, Disp: , Rfl:      senna-docusate (SENOKOT-S;PERICOLACE) 8.6-50 MG per tablet, Take 1 tablet by mouth 2 times daily as needed for constipation, Disp: 100 tablet, Rfl: 0     levonorgestrel (MIRENA) 20 MCG/24HR IUD, 1 each (20 mcg) by Intrauterine route once for 1 dose, Disp: 1 each, Rfl: 0     ALLERGIES:    Allergies   Allergen Reactions     Adhesive Tape Rash     Penicillins Rash     Rash on Augmentin        SOCIAL HISTORY:   Social History     Social History     Marital status:      Spouse name: N/A     Number of children: N/A     Years of education: N/A     Occupational History      [employer]     organizer [employer]     Social History Main Topics     Smoking status: Never Smoker     Smokeless tobacco: Never Used     Alcohol use No     Drug use: No     Sexual activity: Not Currently     Partners: Male     Birth control/ protection: None     Other Topics Concern     Parent/Sibling W/ Cabg, Mi Or Angioplasty Before 65f 55m? No     Social History Narrative    Caffeine intake/servings daily - 0    Calcium intake/servings daily - 3    Exercise 3 times weekly - describe yoga, runs, bikes    Sunscreen used - Yes    Seatbelts used - Yes    Guns stored in the home - Yes    Self Breast Exam - Yes    Pap test up to date -  Yes    Eye exam up to date -  Yes    Dental exam up to date -  Yes    DEXA scan up to date -  No    Flex Sig/Colonoscopy up to date -  No    Mammography up to date -  No    Immunizations reviewed and up to date - Yes    Abuse: Current or Past (Physical, Sexual or Emotional) - No    Do you feel safe in your environment - Yes    Do you cope well with stress - Yes    Do you suffer from insomnia - No    Last updated by: Betzaida Mckeon  12/2/2014                FAMILY HISTORY:   Family History   Problem Relation Age of Onset     Respiratory Mother      COPD     Psychotic Disorder Mother      Bi polar     Mental Illness Mother      diagnosed  "bipolar; depression     Lipids Father      HEART DISEASE Maternal Grandmother      Cerebrovascular Disease Maternal Grandmother      Cancer Paternal Aunt      breast 60s        EXAM:Vitals: /68  Ht 5' 4\" (1.626 m)  Wt 145 lb (65.8 kg)  BMI 24.89 kg/m2  BMI= Body mass index is 24.89 kg/(m^2).    General appearance: Patient is alert and fully cooperative with history & exam.  No sign of distress is noted during the visit.     Psychiatric: Affect is pleasant & appropriate.  Patient appears motivated to improve health.     Respiratory: Breathing is regular & unlabored while sitting.     HEENT: Hearing is intact to spoken word.  Speech is clear.  No gross evidence of visual impairment that would impact ambulation.     Dermatologic: Small hyperkeratotic lesion to plantar R heel, no frankly open wound.  Pain to pressure here.  No paronychia or evidence of soft tissue infection is noted.     Vascular: DP & PT pulses are intact & regular on the R.  No significant edema or varicosities noted.  CFT and skin temperature are normal to both lower extremities.     Neurologic: Lower extremity sensation is intact to light touch.  No evidence of weakness or contracture in the lower extremities.  No evidence of neuropathy.     Musculoskeletal: Patient is ambulatory without assistive device or brace.  No gross ankle deformity noted.  No foot or ankle joint effusion is noted.     ASSESSMENT: R foot foreign bodies     PLAN:  Reviewed patient's chart in epic.  Discussed condition and treatment options including pros and cons.    Advised we start with debridement and possible removal.  Pt agreed.    After consent, I prepped the heel with alcohol.  I used a sterile 15 blade to debride the callus.  Using a sterile splinter forceps I removed 2 small clear glass foreign bodies embedded in the skin, about 1mm in diameter each.  No other FB noted.  Small pinpoint opening.  No purulence.  Area was dressed with bacitracin and bandage.  " Change daily until healed.  No anesthesia needed.  Care instructions given.    Seek immediate care for worsening redness, drainage, swelling, pain.    Discussed risk of residual foreign bodies.      F/u prn.       Narciso Godinez DPM, FACFAS          Again, thank you for allowing me to participate in the care of your patient.        Sincerely,        Narciso Godinez DPM

## 2018-08-20 NOTE — PATIENT INSTRUCTIONS
Thank you for choosing [facility]!    Follow up as needed     DR. BARAKAT'S CLINIC LOCATIONS     MONDAY  [place] TUESDAY & FRIDAY AM  [place]   [address] [facility]   [address]   [place] [address] [place] [address]   [phone]  [phone]       WEDNESDAY  [place] SCHEDULE SURGERY: [phone]   [address] [facility] APPOINTMENTS: [phone]   [place] [address] BILLING QUESTIONS: [phone]   [phone]         Wound Care Recommendations:    1)  Keep the wound covered by a bandage when bathing.    2)  Gently clean the wound with soap water, separate from bath/shower water.      3)  Each day, apply a topical antibiotic ointment to the wound (Neosporin, Triple antibiotic, Bacitracin).   Cover with large band-aid or gauze.      4)  Please seek immediate medical attention if any increasing redness, swelling, drainage, smell, or pain related to the wound.     5)  Please return to clinic in the period of time requested.      Body Mass Index (BMI)  Many things can cause foot and ankle problems. Foot structure, activity level, foot mechanics and injuries are common causes of pain.  One very important issue that often goes unmentioned, is body weight. Extra weight can cause increased stress on muscles, ligaments, bones and tendons.  Sometimes just a few extra pounds is all it takes to put one over her/his threshold. Without reducing that stress, it can be difficult to alleviate pain. Some people are uncomfortable addressing this issue, but we feel it is important for you to think about it. As Foot &  Ankle specialists, our job is addressing the lower extremity problem and possible causes. Regarding extra body weight, we encourage patients to discuss diet and weight management plans with their primary care doctors. It is this team approach that gives you the best opportunity for pain relief and getting  you back on your feet.

## 2018-08-20 NOTE — MR AVS SNAPSHOT
After Visit Summary   8/20/2018    Allyn Mcqueen    MRN: [id number]           Patient Information     Date Of Birth          1980        Visit Information        Provider Department      8/20/2018 9:45 AM Narciso Barakat DPM [facility]        Today's Diagnoses     Foreign body in right foot, initial encounter    -  1      Care Instructions    Thank you for choosing [facility]!    Follow up as needed     DR. BARAKAT'S CLINIC LOCATIONS     MONDAY  [place] TUESDAY & FRIDAY AM  [place]   [address] [facility]   [address]   [place] [address] [place] [address]   [phone]  [phone]       WEDNESDAY  [place] SCHEDULE SURGERY: [phone]   [address] [facility] APPOINTMENTS: [phone]   [place] [address] BILLING QUESTIONS: [phone]   [phone]         Wound Care Recommendations:    1)  Keep the wound covered by a bandage when bathing.    2)  Gently clean the wound with soap water, separate from bath/shower water.      3)  Each day, apply a topical antibiotic ointment to the wound (Neosporin, Triple antibiotic, Bacitracin).   Cover with large band-aid or gauze.      4)  Please seek immediate medical attention if any increasing redness, swelling, drainage, smell, or pain related to the wound.     5)  Please return to clinic in the period of time requested.      Body Mass Index (BMI)  Many things can cause foot and ankle problems. Foot structure, activity level, foot mechanics and injuries are common causes of pain.  One very important issue that often goes unmentioned, is body weight. Extra weight can cause increased stress on muscles, ligaments, bones and tendons.  Sometimes just a few extra pounds is all it takes to put one over her/his threshold. Without reducing that stress, it can be difficult to alleviate pain. Some people are uncomfortable  addressing this issue, but we feel it is important for you to think about it. As Foot &  Ankle specialists, our job is addressing the lower extremity problem and possible causes. Regarding extra body weight, we encourage patients to discuss diet and weight management plans with their primary care doctors. It is this team approach that gives you the best opportunity for pain relief and getting you back on your feet.              Follow-ups after your visit        Follow-up notes from your care team     Return if symptoms worsen or fail to improve.      Your next 10 appointments already scheduled     Aug 21, 2018  2:20 PM CDT   [facility] with Noelle Lundy MD   [facility]    [address] [place] [id number] [phone]              Who to contact     If you have questions or need follow up information about today's clinic visit or your schedule please contact [facility] directly at [phone].  Normal or non-critical lab and imaging results will be communicated to you by [employer], letter or phone within 4 business days after the clinic has received the results. If you do not hear from us within 7 days, please contact the clinic through [employer] or phone. If you have a critical or abnormal lab result, we will notify you by phone as soon as possible.  Submit refill requests through [employer] or call your pharmacy and they will forward the refill request to us. Please allow 3 business days for your refill to be completed.          Additional Information About Your Visit        [employer] Information     [employer] gives you secure access to your electronic health record. If you see a primary care provider, you can also send messages to your care team and make appointments. If you have questions, please call your primary care clinic.  If you do not have a primary care provider, please call [phone] and they will assist  "you.        Care EveryWhere ID     This is your Care EveryWhere ID. This could be used by other organizations to access your [facility] medical records  [id number]        Your Vitals Were     Height BMI (Body Mass Index)                5' 4\" (1.626 m) 24.89 kg/m2           Blood Pressure from Last 3 Encounters:   08/20/18 118/68   04/18/18 126/84   03/12/18 111/75    Weight from Last 3 Encounters:   08/20/18 145 lb (65.8 kg)   04/18/18 162 lb (73.5 kg)   03/08/18 161 lb 6 oz (73.2 kg)              We Performed the Following     REMOVE FOREIGN BODY SIMPLE        Primary Care Provider Office Phone # Fax #    Noelle Lundy [phone] [address]  [facility] [address]        Equal Access to Services     [facility] : Hadii shwetha ku hadasho Soomaali, waaxda luqadaha, qaybta kaalmada adeegyada, yuliya urrutia haylindsey cruz . So [facility] [phone].    ATENCIÓN: Si habla español, tiene a ferrer disposición servicios gratuitos de asistencia lingüística. Llame al [phone].    We comply with applicable federal civil rights laws and [place] laws. We do not discriminate on the basis of race, color, national origin, age, disability, sex, sexual orientation, or gender identity.            Thank you!     Thank you for choosing [facility]  for your care. Our goal is always to provide you with excellent care. Hearing back from our patients is one way we can continue to improve our services. Please take a few minutes to complete the written survey that you may receive in the mail after your visit with us. Thank you!             Your Updated Medication List - Protect others around you: Learn how to safely use, store and throw away your medicines at www.disposemymeds.org.          This list is accurate as of 8/20/18 10:06 AM.  Always use your most recent med list.                   Brand Name Dispense Instructions for use Diagnosis    acetaminophen 325 MG tablet    " TYLENOL    60 tablet    Take 2 tablets (650 mg) by mouth every 4 hours as needed for mild pain or fever (greater than or equal to 38? C /100.4? F (oral) or 38.5? C/ 101.4? F (core).)     (normal spontaneous vaginal delivery)       breast pump Misc     1 each    1 each as needed     infant       ibuprofen 600 MG tablet    ADVIL/MOTRIN    60 tablet    Take 1 tablet (600 mg) by mouth every 6 hours as needed for moderate pain     (normal spontaneous vaginal delivery)       levonorgestrel 20 MCG/24HR IUD    MIRENA    1 each    1 each (20 mcg) by Intrauterine route once for 1 dose    Encounter for IUD insertion       order for DME     1 Device    Equipment being ordered: bilateral electric breast pump    Breast feeding status of mother       prenatal multivitamin plus iron 27-0.8 MG Tabs per tablet      Take 1 tablet by mouth daily        senna-docusate 8.6-50 MG per tablet    SENOKOT-S;PERICOLACE    100 tablet    Take 1 tablet by mouth 2 times daily as needed for constipation     (normal spontaneous vaginal delivery)

## 2018-08-20 NOTE — PROGRESS NOTES
PATIENT HISTORY:  Allyn Mcqueen is a 37 year old female who presents to clinic for glass in her right heel.  Present for about a month.  She stepped on a broken drinking glass.  Piece is very small according to pt.  2-3/10 pain with pressure, better with rest.  She tried to remove some but couldn't get it all.  Denies infection, bleeding.      I was requested to see this patient for this issue by Noelle Lundy MD.    Review of Systems:  Patient denies fever, chills, rash, stiffness, limping, numbness, weakness, heart burn, blood in stool, chest pain with activity, calf pain when walking, shortness of breath with activity, chronic cough, easy bleeding/bruising, swelling of ankles, excessive thirst, fatigue, depression, anxiety.  Patient admits to wound.     PAST MEDICAL HISTORY:   Past Medical History:   Diagnosis Date     Ovarian cyst rupture 04/2010        PAST SURGICAL HISTORY:   Past Surgical History:   Procedure Laterality Date     C SPINAL FUSION,ANT,EA ADNL LEVEL      fused cervical vertebrae ?c3-4        MEDICATIONS:   Current Outpatient Prescriptions:      acetaminophen (TYLENOL) 325 MG tablet, Take 2 tablets (650 mg) by mouth every 4 hours as needed for mild pain or fever (greater than or equal to 38  C /100.4  F (oral) or 38.5  C/ 101.4  F (core).), Disp: 60 tablet, Rfl: 0     ibuprofen (ADVIL/MOTRIN) 600 MG tablet, Take 1 tablet (600 mg) by mouth every 6 hours as needed for moderate pain, Disp: 60 tablet, Rfl: 0     Misc. Devices (BREAST PUMP) MISC, 1 each as needed, Disp: 1 each, Rfl: 0     order for DME, Equipment being ordered: bilateral electric breast pump, Disp: 1 Device, Rfl: 0     Prenatal Vit-Fe Fumarate-FA (PRENATAL MULTIVITAMIN PLUS IRON) 27-0.8 MG TABS per tablet, Take 1 tablet by mouth daily, Disp: , Rfl:      senna-docusate (SENOKOT-S;PERICOLACE) 8.6-50 MG per tablet, Take 1 tablet by mouth 2 times daily as needed for constipation, Disp: 100 tablet, Rfl: 0     levonorgestrel (MIRENA) 20  "MCG/24HR IUD, 1 each (20 mcg) by Intrauterine route once for 1 dose, Disp: 1 each, Rfl: 0     ALLERGIES:    Allergies   Allergen Reactions     Adhesive Tape Rash     Penicillins Rash     Rash on Augmentin        SOCIAL HISTORY:   Social History     Social History     Marital status:      Spouse name: N/A     Number of children: N/A     Years of education: N/A     Occupational History      [employer]     organizer [employer]     Social History Main Topics     Smoking status: Never Smoker     Smokeless tobacco: Never Used     Alcohol use No     Drug use: No     Sexual activity: Not Currently     Partners: Male     Birth control/ protection: None     Other Topics Concern     Parent/Sibling W/ Cabg, Mi Or Angioplasty Before 65f 55m? No     Social History Narrative    Caffeine intake/servings daily - 0    Calcium intake/servings daily - 3    Exercise 3 times weekly - describe yoga, runs, bikes    Sunscreen used - Yes    Seatbelts used - Yes    Guns stored in the home - Yes    Self Breast Exam - Yes    Pap test up to date -  Yes    Eye exam up to date -  Yes    Dental exam up to date -  Yes    DEXA scan up to date -  No    Flex Sig/Colonoscopy up to date -  No    Mammography up to date -  No    Immunizations reviewed and up to date - Yes    Abuse: Current or Past (Physical, Sexual or Emotional) - No    Do you feel safe in your environment - Yes    Do you cope well with stress - Yes    Do you suffer from insomnia - No    Last updated by: Betzaida Mckeon  12/2/2014                FAMILY HISTORY:   Family History   Problem Relation Age of Onset     Respiratory Mother      COPD     Psychotic Disorder Mother      Bi polar     Mental Illness Mother      diagnosed bipolar; depression     Lipids Father      HEART DISEASE Maternal Grandmother      Cerebrovascular Disease Maternal Grandmother      Cancer Paternal Aunt      breast 60s        EXAM:Vitals: /68  Ht 5' 4\" (1.626 m)  Wt 145 lb (65.8 kg)  BMI " 24.89 kg/m2  BMI= Body mass index is 24.89 kg/(m^2).    General appearance: Patient is alert and fully cooperative with history & exam.  No sign of distress is noted during the visit.     Psychiatric: Affect is pleasant & appropriate.  Patient appears motivated to improve health.     Respiratory: Breathing is regular & unlabored while sitting.     HEENT: Hearing is intact to spoken word.  Speech is clear.  No gross evidence of visual impairment that would impact ambulation.     Dermatologic: Small hyperkeratotic lesion to plantar R heel, no frankly open wound.  Pain to pressure here.  No paronychia or evidence of soft tissue infection is noted.     Vascular: DP & PT pulses are intact & regular on the R.  No significant edema or varicosities noted.  CFT and skin temperature are normal to both lower extremities.     Neurologic: Lower extremity sensation is intact to light touch.  No evidence of weakness or contracture in the lower extremities.  No evidence of neuropathy.     Musculoskeletal: Patient is ambulatory without assistive device or brace.  No gross ankle deformity noted.  No foot or ankle joint effusion is noted.     ASSESSMENT: R foot foreign bodies     PLAN:  Reviewed patient's chart in epic.  Discussed condition and treatment options including pros and cons.    Advised we start with debridement and possible removal.  Pt agreed.    After consent, I prepped the heel with alcohol.  I used a sterile 15 blade to debride the callus.  Using a sterile splinter forceps I removed 2 small clear glass foreign bodies embedded in the skin, about 1mm in diameter each.  No other FB noted.  Small pinpoint opening.  No purulence.  Area was dressed with bacitracin and bandage.  Change daily until healed.  No anesthesia needed.  Care instructions given.    Seek immediate care for worsening redness, drainage, swelling, pain.    Discussed risk of residual foreign bodies.      F/u prn.       Narciso Godinez DPM,  FACFAS

## 2018-08-21 ENCOUNTER — OFFICE VISIT (OUTPATIENT)
Dept: FAMILY MEDICINE | Facility: CLINIC | Age: 38
End: 2018-08-21
Payer: COMMERCIAL

## 2018-08-21 VITALS
OXYGEN SATURATION: 99 % | WEIGHT: 147.5 LBS | BODY MASS INDEX: 25.18 KG/M2 | DIASTOLIC BLOOD PRESSURE: 76 MMHG | HEART RATE: 67 BPM | TEMPERATURE: 97.7 F | HEIGHT: 64 IN | SYSTOLIC BLOOD PRESSURE: 114 MMHG

## 2018-08-21 DIAGNOSIS — Z00.00 WELL WOMAN EXAM (NO GYNECOLOGICAL EXAM): Primary | ICD-10-CM

## 2018-08-21 PROCEDURE — 99395 PREV VISIT EST AGE 18-39: CPT | Performed by: FAMILY MEDICINE

## 2018-08-21 NOTE — PROGRESS NOTES
SUBJECTIVE:   CC: Allyn Mcqueen is an 37 year old woman who presents for preventive health visit.     Physical   Annual:     Getting at least 3 servings of Calcium per day:  Yes    Bi-annual eye exam:  Yes    Dental care twice a year:  Yes    Sleep apnea or symptoms of sleep apnea:  None    Diet:  Regular (no restrictions)    Frequency of exercise:  2-3 days/week    Duration of exercise:  30-45 minutes    Taking medications regularly:  Yes    Medication side effects:  None    Additional concerns today:  YES (heartburn )      GERD/Heartburn      Duration: worse over the past month    Aggravated by caffeine    Possibly aggravated by stress/anxiety; loss of friend in mid 40's of CAD    Relieved by TUMS    Description (location/character/radiation): epigastric pain    Accompanying signs and symptoms:  food getting stuck: no   nausea/vomiting/blood: no   black/tarry or bloody stools: no :    History (similar episodes/previous evaluation): None    Precipitating or alleviating factors:  worse with caffeinated drinks.  current NSAID/Aspirin use: no     Therapies tried and outcome: Zantac (Ranitidine)    Today's PHQ-2 Score:   PHQ-2 ( 1999 Pfizer) 8/21/2018   Q1: Little interest or pleasure in doing things 0   Q2: Feeling down, depressed or hopeless 0   PHQ-2 Score 0   Q1: Little interest or pleasure in doing things Not at all   Q2: Feeling down, depressed or hopeless Not at all   PHQ-2 Score 0       Abuse: Current or Past(Physical, Sexual or Emotional)- No  Do you feel safe in your environment - Yes    Social History   Substance Use Topics     Smoking status: Never Smoker     Smokeless tobacco: Never Used     Alcohol use 0.0 oz/week      Comment: occ     Alcohol Use 8/21/2018   If you drink alcohol do you typically have greater than 3 drinks per day OR greater than 7 drinks per week? No     Reviewed orders with patient.  Reviewed health maintenance and updated orders accordingly - Yes  BP Readings from Last 3  Encounters:   08/21/18 114/76   08/20/18 118/68   04/18/18 126/84    Wt Readings from Last 3 Encounters:   08/21/18 147 lb 8 oz (66.9 kg)   08/20/18 145 lb (65.8 kg)   04/18/18 162 lb (73.5 kg)                  Current Outpatient Prescriptions   Medication Sig Dispense Refill     acetaminophen (TYLENOL) 325 MG tablet Take 2 tablets (650 mg) by mouth every 4 hours as needed for mild pain or fever (greater than or equal to 38  C /100.4  F (oral) or 38.5  C/ 101.4  F (core).) 60 tablet 0     ibuprofen (ADVIL/MOTRIN) 600 MG tablet Take 1 tablet (600 mg) by mouth every 6 hours as needed for moderate pain 60 tablet 0     Misc. Devices (BREAST PUMP) MISC 1 each as needed 1 each 0     order for DME Equipment being ordered: bilateral electric breast pump 1 Device 0     Prenatal Vit-Fe Fumarate-FA (PRENATAL MULTIVITAMIN PLUS IRON) 27-0.8 MG TABS per tablet Take 1 tablet by mouth daily       levonorgestrel (MIRENA) 20 MCG/24HR IUD 1 each (20 mcg) by Intrauterine route once for 1 dose 1 each 0     Allergies   Allergen Reactions     Adhesive Tape Rash     Penicillins Rash     Rash on Augmentin     Recent Labs   Lab Test  05/30/13   1100   TSH  1.52            Pertinent mammograms are reviewed under the imaging tab.  History of abnormal Pap smear:   NO - age 30-65 PAP every 5 years with negative HPV co-testing recommended  Last 3 Pap and HPV Results:   PAP / HPV Latest Ref Rng & Units 4/18/2018 8/25/2015 7/30/2012   PAP - NIL NIL NIL   HPV 16 DNA NEG:Negative Negative Negative -   HPV 18 DNA NEG:Negative Negative Negative -   OTHER HR HPV NEG:Negative Negative Negative -     PAP / HPV Latest Ref Rng & Units 4/18/2018 8/25/2015 7/30/2012   PAP - NIL NIL NIL   HPV 16 DNA NEG:Negative Negative Negative -   HPV 18 DNA NEG:Negative Negative Negative -   OTHER HR HPV NEG:Negative Negative Negative -     Reviewed and updated as needed this visit by clinical staff  Tobacco  Allergies  Meds  Med Hx  Surg Hx  Fam Hx  Soc Hx         Reviewed and updated as needed this visit by Provider        Past Medical History:   Diagnosis Date     Ovarian cyst rupture 2010      Past Surgical History:   Procedure Laterality Date     C SPINAL FUSION,ANT,EA ADNL LEVEL      fused cervical vertebrae ?c3-4     Obstetric History       T2      L2     SAB0   TAB0   Ectopic0   Multiple0   Live Births2       # Outcome Date GA Lbr Hill/2nd Weight Sex Delivery Anes PTL Lv   2 Term 18 39w6d  8 lb 10 oz (3.912 kg) M Vag-Spont  N MACIE      Name: CAN CHERY      Apgar1:  9                Apgar5: 9   1 Term 07/14/15 39w3d 09:10 / 02:00 8 lb 3.2 oz (3.719 kg) M Vag-Spont EPI  MACIE      Name: Manuel      Apgar1:  9                Apgar5: 9        Family History   Problem Relation Age of Onset     Respiratory Mother      COPD     Psychotic Disorder Mother      Bi polar     Mental Illness Mother      diagnosed bipolar; depression     Lipids Father      HEART DISEASE Maternal Grandmother      Cerebrovascular Disease Maternal Grandmother      Cancer Paternal Aunt      breast 60s      Review of Systems  CONSTITUTIONAL: NEGATIVE for fever, chills, change in weight  INTEGUMENTARU/SKIN: NEGATIVE for worrisome rashes, moles or lesions  EYES: NEGATIVE for vision changes or irritation  ENT: NEGATIVE for ear, mouth and throat problems  RESP: NEGATIVE for significant cough or SOB  BREAST: NEGATIVE for masses, tenderness or discharge  CV: NEGATIVE for chest pain, palpitations or peripheral edema  GI: NEGATIVE for nausea, abdominal pain, heartburn, or change in bowel habits  : NEGATIVE for unusual urinary or vaginal symptoms. Periods are regular.  MUSCULOSKELETAL: NEGATIVE for significant arthralgias or myalgia  NEURO: NEGATIVE for weakness, dizziness or paresthesias  ENDOCRINE: NEGATIVE for temperature intolerance, skin/hair changes  HEME/ALLERGY/IMMUNE: NEGATIVE for bleeding problems  PSYCHIATRIC: NEGATIVE for changes in mood or affect     OBJECTIVE:  "  /76  Pulse 67  Temp 97.7  F (36.5  C) (Oral)  Ht 5' 4.25\" (1.632 m)  Wt 147 lb 8 oz (66.9 kg)  SpO2 99%  BMI 25.12 kg/m2  Physical Exam  GENERAL: healthy, alert and no distress  EYES: Eyes grossly normal to inspection, PERRL and conjunctivae and sclerae normal  HENT: ear canals and TM's normal, nose and mouth without ulcers or lesions  NECK: no adenopathy, no asymmetry, masses, or scars and thyroid normal to palpation  RESP: lungs clear to auscultation - no rales, rhonchi or wheezes  CV: regular rate and rhythm, normal S1 S2, no S3 or S4, no murmur, click or rub, no peripheral edema and peripheral pulses strong  ABDOMEN: soft, nontender, no hepatosplenomegaly, no masses and bowel sounds normal  MS: no gross musculoskeletal defects noted, no edema  SKIN: no suspicious lesions or rashes  NEURO: Normal strength and tone, mentation intact and speech normal  PSYCH: mentation appears normal, affect normal/bright    Diagnostic Test Results:  none     ASSESSMENT/PLAN:   1. Well woman exam (no gynecological exam)  routine    COUNSELING:  Reviewed preventive health counseling, as reflected in patient instructions    BP Readings from Last 1 Encounters:   08/21/18 114/76     Estimated body mass index is 25.12 kg/(m^2) as calculated from the following:    Height as of this encounter: 5' 4.25\" (1.632 m).    Weight as of this encounter: 147 lb 8 oz (66.9 kg).           reports that she has never smoked. She has never used smokeless tobacco.      Counseling Resources:  ATP IV Guidelines  Pooled Cohorts Equation Calculator  Breast Cancer Risk Calculator  FRAX Risk Assessment  ICSI Preventive Guidelines  Dietary Guidelines for Americans, 2010  [email or web address]'s MyPlate  ASA Prophylaxis  Lung CA Screening    Noelle Lundy MD  [facility]  Answers for HPI/ROS submitted by the patient on 8/21/2018   PHQ-2 Score: 0    "

## 2018-08-21 NOTE — MR AVS SNAPSHOT
After Visit Summary   8/21/2018    Allyn Mcqueen    MRN: [id number]           Patient Information     Date Of Birth          1980        Visit Information        Provider Department      8/21/2018 2:20 PM Noelle Lundy MD [facility]        Today's Diagnoses     Well woman exam (no gynecological exam)    -  1      Care Instructions    Raniditine; take 150 mg twice daily for one month  Tums as needed    You can contact our clinic anytime to schedule an appointment with Azeem Sam, our behavioralist here at [facility].      Preventive Health Recommendations  Female Ages 26 - 39  Yearly exam:   See your health care provider every year in order to    Review health changes.     Discuss preventive care.      Review your medicines if you your doctor has prescribed any.    Until age 30: Get a Pap test every three years (more often if you have had an abnormal result).    After age 30: Talk to your doctor about whether you should have a Pap test every 3 years or have a Pap test with HPV screening every 5 years.   You do not need a Pap test if your uterus was removed (hysterectomy) and you have not had cancer.  You should be tested each year for STDs (sexually transmitted diseases), if you're at risk.   Talk to your provider about how often to have your cholesterol checked.  If you are at risk for diabetes, you should have a diabetes test (fasting glucose).  Shots: Get a flu shot each year. Get a tetanus shot every 10 years.   Nutrition:     Eat at least 5 servings of fruits and vegetables each day.    Eat whole-grain bread, whole-wheat pasta and brown rice instead of white grains and rice.    Get adequate Calcium and Vitamin D.     Lifestyle    Exercise at least 150 minutes a week (30 minutes a day, 5 days of the week). This will help you control your weight and prevent disease.    Limit alcohol to one drink per day.    No smoking.     Wear sunscreen to prevent skin  "cancer.    See your dentist every six months for an exam and cleaning.            Follow-ups after your visit        Who to contact     If you have questions or need follow up information about today's clinic visit or your schedule please contact [facility] directly at [phone].  Normal or non-critical lab and imaging results will be communicated to you by MyChart, letter or phone within 4 business days after the clinic has received the results. If you do not hear from us within 7 days, please contact the clinic through [employer] or phone. If you have a critical or abnormal lab result, we will notify you by phone as soon as possible.  Submit refill requests through [employer] or call your pharmacy and they will forward the refill request to us. Please allow 3 business days for your refill to be completed.          Additional Information About Your Visit        MyChart Information     [employer] gives you secure access to your electronic health record. If you see a primary care provider, you can also send messages to your care team and make appointments. If you have questions, please call your primary care clinic.  If you do not have a primary care provider, please call [phone] and they will assist you.        Care EveryWhere ID     This is your Care EveryWhere ID. This could be used by other organizations to access your [facility] medical records  [id number]        Your Vitals Were     Pulse Temperature Height Pulse Oximetry BMI (Body Mass Index)       67 97.7  F (36.5  C) (Oral) 5' 4.25\" (1.632 m) 99% 25.12 kg/m2        Blood Pressure from Last 3 Encounters:   08/21/18 114/76   08/20/18 118/68   04/18/18 126/84    Weight from Last 3 Encounters:   08/21/18 147 lb 8 oz (66.9 kg)   08/20/18 145 lb (65.8 kg)   04/18/18 162 lb (73.5 kg)              Today, you had the following     No orders found for display       Primary Care Provider Office Phone # Fax #    Noelle Lundy [phone] " [phone]       [address]  [facility] [address]        Equal Access to Services     JALIL PERKINS : Hadii shwetha hung erickapantera Sokristina, waaxda luqadaha, qaybta kaalmada sunny, yuliya darrelin hayaadaniel bynumsoledad grewalmaria fernanda wilson. So [facility] [phone].    ATENCIÓN: Si habla español, tiene a ferrer disposición servicios gratuitos de asistencia lingüística. Llame al [phone].    We comply with applicable federal civil rights laws and [place] laws. We do not discriminate on the basis of race, color, national origin, age, disability, sex, sexual orientation, or gender identity.            Thank you!     Thank you for choosing [facility]  for your care. Our goal is always to provide you with excellent care. Hearing back from our patients is one way we can continue to improve our services. Please take a few minutes to complete the written survey that you may receive in the mail after your visit with us. Thank you!             Your Updated Medication List - Protect others around you: Learn how to safely use, store and throw away your medicines at www.disposemymeds.org.          This list is accurate as of 18  3:36 PM.  Always use your most recent med list.                   Brand Name Dispense Instructions for use Diagnosis    acetaminophen 325 MG tablet    TYLENOL    60 tablet    Take 2 tablets (650 mg) by mouth every 4 hours as needed for mild pain or fever (greater than or equal to 38? C /100.4? F (oral) or 38.5? C/ 101.4? F (core).)     (normal spontaneous vaginal delivery)       breast pump Misc     1 each    1 each as needed     infant       ibuprofen 600 MG tablet    ADVIL/MOTRIN    60 tablet    Take 1 tablet (600 mg) by mouth every 6 hours as needed for moderate pain     (normal spontaneous vaginal delivery)       levonorgestrel 20 MCG/24HR IUD    MIRENA    1 each    1 each (20 mcg) by Intrauterine route once for 1 dose    Encounter for IUD insertion       order for DME     1  Device    Equipment being ordered: bilateral electric breast pump    Breast feeding status of mother       prenatal multivitamin plus iron 27-0.8 MG Tabs per tablet      Take 1 tablet by mouth daily

## 2018-08-21 NOTE — PATIENT INSTRUCTIONS
Raniditine; take 150 mg twice daily for one month  Tums as needed    You can contact our clinic anytime to schedule an appointment with Azeem Sam, our behavioralist here at [facility].      Preventive Health Recommendations  Female Ages 26 - 39  Yearly exam:   See your health care provider every year in order to    Review health changes.     Discuss preventive care.      Review your medicines if you your doctor has prescribed any.    Until age 30: Get a Pap test every three years (more often if you have had an abnormal result).    After age 30: Talk to your doctor about whether you should have a Pap test every 3 years or have a Pap test with HPV screening every 5 years.   You do not need a Pap test if your uterus was removed (hysterectomy) and you have not had cancer.  You should be tested each year for STDs (sexually transmitted diseases), if you're at risk.   Talk to your provider about how often to have your cholesterol checked.  If you are at risk for diabetes, you should have a diabetes test (fasting glucose).  Shots: Get a flu shot each year. Get a tetanus shot every 10 years.   Nutrition:     Eat at least 5 servings of fruits and vegetables each day.    Eat whole-grain bread, whole-wheat pasta and brown rice instead of white grains and rice.    Get adequate Calcium and Vitamin D.     Lifestyle    Exercise at least 150 minutes a week (30 minutes a day, 5 days of the week). This will help you control your weight and prevent disease.    Limit alcohol to one drink per day.    No smoking.     Wear sunscreen to prevent skin cancer.    See your dentist every six months for an exam and cleaning.

## 2018-12-14 ENCOUNTER — OFFICE VISIT (OUTPATIENT)
Dept: FAMILY MEDICINE | Facility: CLINIC | Age: 38
End: 2018-12-14
Payer: COMMERCIAL

## 2018-12-14 VITALS
OXYGEN SATURATION: 99 % | SYSTOLIC BLOOD PRESSURE: 113 MMHG | TEMPERATURE: 98.1 F | BODY MASS INDEX: 24.16 KG/M2 | DIASTOLIC BLOOD PRESSURE: 79 MMHG | WEIGHT: 141.5 LBS | HEART RATE: 62 BPM | RESPIRATION RATE: 16 BRPM | HEIGHT: 64 IN

## 2018-12-14 DIAGNOSIS — H69.92 DYSFUNCTION OF LEFT EUSTACHIAN TUBE: ICD-10-CM

## 2018-12-14 DIAGNOSIS — J01.30 ACUTE NON-RECURRENT SPHENOIDAL SINUSITIS: Primary | ICD-10-CM

## 2018-12-14 PROCEDURE — 99213 OFFICE O/P EST LOW 20 MIN: CPT | Performed by: FAMILY MEDICINE

## 2018-12-14 ASSESSMENT — MIFFLIN-ST. JEOR: SCORE: 1300.81

## 2018-12-14 NOTE — PROGRESS NOTES
"  SUBJECTIVE:   Allyn Mcqueen is a 38 year old female who presents to clinic today for the following health issues:      Acute Illness   Acute illness concerns: cold sx, now with left sided ear pain, wants to get it checked out, has to play oboe in a concert tomorow  Onset: 2 weeks and couple days for sinus     Fever: no    Chills/Sweats: no    Headache (location?): YES- sinus    Sinus Pressure:YES- post-nasal drainage and facial pain    Conjunctivitis:  no    Ear Pain: YES: left-moderate, plugged, muffled     Rhinorrhea: YES    Congestion: YES    Sore Throat: YES     Cough: YES-non-productive    Wheeze: no    Decreased Appetite: no    Nausea: no    Vomiting: no    Diarrhea:  no    Dysuria/Freq.: YES- freq    Fatigue/Achiness: YES- both    Sick/Strep Exposure: no     Therapies Tried and outcome: ibuprofen and more fluids and rest and cough drops and throat lozenges        Allergies   Allergen Reactions     Adhesive Tape Rash     Penicillins Rash     Rash on Augmentin      Past Medical History:   Diagnosis Date     Ovarian cyst rupture 04/2010      /79 (BP Location: Left arm, Patient Position: Sitting, Cuff Size: Adult Regular)   Pulse 62   Temp 98.1  F (36.7  C) (Oral)   Resp 16   Ht 1.632 m (5' 4.25\")   Wt 64.2 kg (141 lb 8 oz)   SpO2 99%   Breastfeeding? Yes   BMI 24.10 kg/m     OBJECTIVE: The patient appears healthy, alert and no distress.   EARS: Rt TM: normal and mobile. Lt TM: retracted  NOSE/SINUS: Nares normal. Septum midline. Mucosa normal. No drainage or sinus tenderness.  Sinus palpation: Frontal sinus and Maxillary sinus nontender to palpation   THROAT: normal and no exudates present   NECK:Neck supple. No adenopathy. Thyroid symmetric, normal size,   CHEST: Clear to auscultation    ASSESSMENT: Acute Sinusitis, eustachian tube dysfunction    Please see patient instructions below.       Patient Instructions       I recommend:  1. Take pseudoephedrine 60 mg (2 tabs) every 4 hours. Ask the " pharmacist for the over the counter real  Pseudoephedrine.  2. Tylenol and ibuprofen as needed for headache.  3. Afrin spray for 3 days.  4. Neti pot twice daily (see instructions below).    Sinus Rinse/Neti Pot Instructions  Time Required: 3 to 5 minutes twice daily  1. Fill the neti pot with water. The water should be lukewarm (not too hot, not too cold) and can be poured into the pot directly from the tap (approximately 1/2 cup of water). Distilled water is recommended if the purity/safety of the tap water in your region is questionable.   2. Add 1/4 to 1/2 teaspoon of pickling salt, sea salt or table salt (without added iodine) to the water. Stir with spoon to dissolve thoroughly. You may also use prepackaged packets.  3. Lean your head forward over the basin, bending your neck down slightly with your eyes looking downwards.   4. Gently place the spout of the neti pot inside your right nostril, forming a seal to avoid any outer leakage.   5. Open your mouth slightly. Breathe continuously through your open mouth during this sinus cleansing procedure. This allows a necessary air passageway so that the water will not drain from behind your nose into your mouth.   6. Tilt your head sideways, so that your right nostril is directly above your left nostril. Tip the neti pot, allowing the water solution to pour into your right nostril. Within a few seconds the water will naturally drain from your left nostril into the sink.   7. After the net pot is empty, remove the spout from your right nostril, and exhale through both nostrils. Gently blow your nose into a tissue.   8. Repeat steps 1 through 7 for your left nostril.  Tips:   1. Thoroughly clean your Neti Pot after each use. Periodically place it in your  for a thorough sanitizing.Same as a toothbrush, do not share your neti pot with anyone else. Everyone in the household should have their own neti pot.   2. Try using only half the amount of recommended salt  the first few times you use your neti pot until you become more accustomed to the process.   3. Applying a thin layer of petroleum jelly on the inside of both nostrils before the treatment helps sooth sensitive skin.   4. You may notice improved breathing, smell and taste.   5. If you experience any discomfort please discontinue using your neti pot and consult your primary care doctor.

## 2018-12-14 NOTE — PATIENT INSTRUCTIONS
I recommend:  1. Take pseudoephedrine 60 mg (2 tabs) every 4 hours. Ask the pharmacist for the over the counter real  Pseudoephedrine.  2. Tylenol and ibuprofen as needed for headache.  3. Afrin spray for 3 days.  4. Neti pot twice daily (see instructions below).    Sinus Rinse/Neti Pot Instructions  Time Required: 3 to 5 minutes twice daily  1. Fill the neti pot with water. The water should be lukewarm (not too hot, not too cold) and can be poured into the pot directly from the tap (approximately 1/2 cup of water). Distilled water is recommended if the purity/safety of the tap water in your region is questionable.   2. Add 1/4 to 1/2 teaspoon of pickling salt, sea salt or table salt (without added iodine) to the water. Stir with spoon to dissolve thoroughly. You may also use prepackaged packets.  3. Lean your head forward over the basin, bending your neck down slightly with your eyes looking downwards.   4. Gently place the spout of the neti pot inside your right nostril, forming a seal to avoid any outer leakage.   5. Open your mouth slightly. Breathe continuously through your open mouth during this sinus cleansing procedure. This allows a necessary air passageway so that the water will not drain from behind your nose into your mouth.   6. Tilt your head sideways, so that your right nostril is directly above your left nostril. Tip the neti pot, allowing the water solution to pour into your right nostril. Within a few seconds the water will naturally drain from your left nostril into the sink.   7. After the net pot is empty, remove the spout from your right nostril, and exhale through both nostrils. Gently blow your nose into a tissue.   8. Repeat steps 1 through 7 for your left nostril.  Tips:   1. Thoroughly clean your Neti Pot after each use. Periodically place it in your  for a thorough sanitizing.Same as a toothbrush, do not share your neti pot with anyone else. Everyone in the household should  have their own neti pot.   2. Try using only half the amount of recommended salt the first few times you use your neti pot until you become more accustomed to the process.   3. Applying a thin layer of petroleum jelly on the inside of both nostrils before the treatment helps sooth sensitive skin.   4. You may notice improved breathing, smell and taste.   5. If you experience any discomfort please discontinue using your neti pot and consult your primary care doctor.

## 2019-03-29 ENCOUNTER — OFFICE VISIT (OUTPATIENT)
Dept: FAMILY MEDICINE | Facility: CLINIC | Age: 39
End: 2019-03-29
Payer: COMMERCIAL

## 2019-03-29 VITALS
HEART RATE: 66 BPM | TEMPERATURE: 98 F | RESPIRATION RATE: 23 BRPM | DIASTOLIC BLOOD PRESSURE: 75 MMHG | SYSTOLIC BLOOD PRESSURE: 107 MMHG | OXYGEN SATURATION: 100 % | WEIGHT: 136 LBS | BODY MASS INDEX: 23.22 KG/M2 | HEIGHT: 64 IN
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
## 2019-03-29 DIAGNOSIS — S00.86XA BUG BITE OF FACE WITHOUT INFECTION, INITIAL ENCOUNTER: Primary | ICD-10-CM

## 2019-03-29 PROCEDURE — 99213 OFFICE O/P EST LOW 20 MIN: CPT | Performed by: PHYSICIAN ASSISTANT

## 2019-03-29 ASSESSMENT — MIFFLIN-ST. JEOR: SCORE: 1271.89

## 2019-03-29 NOTE — PATIENT INSTRUCTIONS
I recommend using over the counter antihistamines- Zyrtec, Allegra or Claritin during the day.  You can use benadryl at night if you have significant symptoms. This medication will make you very drowsy.   No need for oral antibiotic at this time.  Continue to keep areas clean and dry.  Return to clinic with any worsening or changes in symptoms and follow up with PCP for routine care.

## 2019-03-29 NOTE — PROGRESS NOTES
SUBJECTIVE:   Allyn Mcqueen is a 38 year old female who presents to clinic today for the following health issues:      Insect/bug bite-     Onset:  1 week and a half     Description:        Type of insect: tick-not sure was on vacation        Location of bite: back of left leg    Accompanying Signs & Symptoms:        Itching: YES              Redness: YES        Warmth: YES        Swelling: YES        Pain: moderate        Drainage: no        Fever: no        Chest Pain: no        Shortness of breath: no                              History of allergic reactions:    Anaphylaxis: no  Hives: YES       If so, did you have/use an epi-pen:  no    Therapies tried and outcome: claritin with total relief        Problem list and histories reviewed & adjusted, as indicated.  Additional history: as documented    Patient Active Problem List   Diagnosis     CARDIOVASCULAR SCREENING; LDL GOAL LESS THAN 160     Instability of pelvis or thigh joint     Back pain     Past Surgical History:   Procedure Laterality Date     C SPINAL FUSION,ANT,EA ADNL LEVEL      fused cervical vertebrae ?c3-4       Social History     Tobacco Use     Smoking status: Never Smoker     Smokeless tobacco: Never Used   Substance Use Topics     Alcohol use: Yes     Alcohol/week: 0.0 oz     Comment: occ     Family History   Problem Relation Age of Onset     Respiratory Mother         COPD     Psychotic Disorder Mother         Bi polar     Mental Illness Mother         diagnosed bipolar; depression     Lipids Father      Heart Disease Maternal Grandmother      Cerebrovascular Disease Maternal Grandmother      Cancer Paternal Aunt         breast 60s         Current Outpatient Medications   Medication Sig Dispense Refill     acetaminophen (TYLENOL) 325 MG tablet Take 2 tablets (650 mg) by mouth every 4 hours as needed for mild pain or fever (greater than or equal to 38  C /100.4  F (oral) or 38.5  C/ 101.4  F (core).) (Patient not taking: Reported on  "3/29/2019) 60 tablet 0     ibuprofen (ADVIL/MOTRIN) 600 MG tablet Take 1 tablet (600 mg) by mouth every 6 hours as needed for moderate pain (Patient not taking: Reported on 3/29/2019) 60 tablet 0     levonorgestrel (MIRENA) 20 MCG/24HR IUD 1 each (20 mcg) by Intrauterine route once for 1 dose 1 each 0     Misc. Devices (BREAST PUMP) MISC 1 each as needed (Patient not taking: Reported on 3/29/2019) 1 each 0     order for DME Equipment being ordered: bilateral electric breast pump (Patient not taking: Reported on 3/29/2019) 1 Device 0     Prenatal Vit-Fe Fumarate-FA (PRENATAL MULTIVITAMIN PLUS IRON) 27-0.8 MG TABS per tablet Take 1 tablet by mouth daily       Allergies   Allergen Reactions     Adhesive Tape Rash     Penicillins Rash     Rash on Augmentin     Labs reviewed in EPIC    Reviewed and updated as needed this visit by clinical staff  Tobacco  Allergies  Meds  Problems  Med Hx  Surg Hx  Fam Hx  Soc Hx        Reviewed and updated as needed this visit by Provider  Tobacco  Allergies  Meds  Problems  Med Hx  Surg Hx  Fam Hx         ROS:  Constitutional, HEENT, cardiovascular, pulmonary, GI, , musculoskeletal, neuro, skin, endocrine and psych systems are negative, except as otherwise noted.    OBJECTIVE:     /75 (BP Location: Left arm, Patient Position: Sitting, Cuff Size: Adult Regular)   Pulse 66   Temp 98  F (36.7  C) (Oral)   Resp 23   Ht 1.626 m (5' 4\")   Wt 61.7 kg (136 lb)   SpO2 100%   BMI 23.34 kg/m    Body mass index is 23.34 kg/m .  GENERAL: healthy, alert and no distress  RESP: lungs clear to auscultation - no rales, rhonchi or wheezes  CV: regular rate and rhythm, normal S1 S2, no S3 or S4, no murmur, click or rub, no peripheral edema and peripheral pulses strong  MS: no gross musculoskeletal defects noted, no edema  SKIN: multiple pinpoint erythematous bites along bilateral legs, no areas of increased swelling, erythema, discharge - all well healing/appearing at this " time.  PSYCH: mentation appears normal, affect normal/bright    Diagnostic Test Results:  none     ASSESSMENT/PLAN:       ICD-10-CM    1. Bug bite of face without infection, initial encounter S00.86XA     W57.XXXA        Patient Instructions   I recommend using over the counter antihistamines- Zyrtec, Allegra or Claritin during the day.  You can use benadryl at night if you have significant symptoms. This medication will make you very drowsy.   No need for oral antibiotic at this time.  Continue to keep areas clean and dry.  Return to clinic with any worsening or changes in symptoms and follow up with PCP for routine care.     Mikayla Horan PA-C  [facility]

## 2019-06-10 NOTE — PROGRESS NOTES
Subjective     Allyn Mcqueen is a 38 year old female who presents to clinic today for the following health issues:    HPI   GERD/Heartburn      Duration: x1 year, on and off, has worsened since having 2nd baby who is about 12 months old now    Description (location/character/radiation): chest, can radiate up toward the neck if severe.    Intensity:  Depends, can be moderate to severe    Accompanying signs and symptoms:  food getting stuck: no   nausea/vomiting/blood: no   abdominal pain: YES- when it gets really bad; indigestion type pain/discomfort. Denies any constipation or diarrhea.  black/tarry or bloody stools: no :    History (similar episodes/previous evaluation): None    Precipitating or alleviating factors:  worse with fatty foods, spicy foods, caffeinated drinks, alcohol and peppermint.   Stress also exacerbates symptoms.  current NSAID/Aspirin use: no     Therapies tried and outcome: Has been taking Zantac (Ranitidine) BID pretty regularly, especially when more stressful at work. Uses Tums if symptoms are mild, which help. She does sit up after meals most of the time- only time she lays down after eating is if she is up overnight breastfeeding and eats a snack before going back to bed.    Current Outpatient Medications   Medication Sig Dispense Refill     omeprazole (PRILOSEC) 20 MG DR capsule Take 1 capsule (20 mg) by mouth daily 30 capsule 3     acetaminophen (TYLENOL) 325 MG tablet Take 2 tablets (650 mg) by mouth every 4 hours as needed for mild pain or fever (greater than or equal to 38  C /100.4  F (oral) or 38.5  C/ 101.4  F (core).) (Patient not taking: Reported on 3/29/2019) 60 tablet 0     ibuprofen (ADVIL/MOTRIN) 600 MG tablet Take 1 tablet (600 mg) by mouth every 6 hours as needed for moderate pain (Patient not taking: Reported on 3/29/2019) 60 tablet 0     levonorgestrel (MIRENA) 20 MCG/24HR IUD 1 each (20 mcg) by Intrauterine route once for 1 dose 1 each 0     Prenatal Vit-Fe Fumarate-FA  (PRENATAL MULTIVITAMIN PLUS IRON) 27-0.8 MG TABS per tablet Take 1 tablet by mouth daily       Allergies   Allergen Reactions     Adhesive Tape Rash     Penicillins Rash     Rash on Augmentin       Review of Systems   ROS COMP: Constitutional, HEENT, cardiovascular, pulmonary, gi and gu systems are negative, except as otherwise noted.      Objective    /49 (BP Location: Left arm, Patient Position: Sitting, Cuff Size: Adult Regular)   Pulse 68   Temp 97.8  F (36.6  C) (Oral)   Resp 16   Wt 62.6 kg (138 lb)   SpO2 100%   BMI 23.69 kg/m    Body mass index is 23.69 kg/m .  Physical Exam   GENERAL: healthy, alert and no distress  NECK: no adenopathy, no asymmetry, masses, or scars and thyroid normal to palpation  RESP: lungs clear to auscultation - no rales, rhonchi or wheezes  CV: regular rate and rhythm, normal S1 S2, no S3 or S4, no murmur, click or rub, no peripheral edema and peripheral pulses strong  ABDOMEN: normal appearing, soft, no tenderness on palpation, normal bowel sounds in all quadrants  no hepatosplenomegaly, no masses and bowel sounds normal  MS: no gross musculoskeletal defects noted, no edema  Skin: normal, no jaundice, warm and dry with normal turgor        Assessment & Plan   Assessment  Heartburn/GERD- symptoms may be being caused by ongoing gastritis caused by recent acute stress, vs perhaps persistent post-pregnancy related hiatal hernia.    Plan  -Avoid triggering foods, sit up after eating  -I prescribed scheduled omeprazole 20mg daily for 30 days to see if symptoms heal. If still symptomatic after 2 weeks of daily use, increase dose increase to 40mg daily  -environmental changes as noted below, see AVS      See Patient Instructions    Return in about 1 month (around 7/9/2019) for follow up if not improving.     Patient Instructions       Patient Education     Tips to Control Acid Reflux    To control acid reflux, you ll need to make some basic diet and lifestyle changes. The simple  steps outlined below may be all you ll need to ease discomfort.  Watch what you eat    Avoid fatty foods and spicy foods.    Eat fewer acidic foods, such as citrus and tomato-based foods. These can increase symptoms.    Limit drinking alcohol, caffeine, and fizzy beverages. All increase acid reflux.    Try limiting chocolate, peppermint, and spearmint. These can worsen acid reflux in some people.  Watch when you eat    Avoid lying down for 3 hours after eating.    Do not snack before going to bed.  Raise your head  Raising your head and upper body by 4 to 6 inches helps limit reflux when you re lying down. Put blocks under the head of your bed frame to raise it.  Other changes    Lose weight, if you need to    Don t exercise near bedtime    Avoid tight-fitting clothes    Limit aspirin and ibuprofen    Stop smoking   Date Last Reviewed: 7/1/2016 2000-2018 [employer]. [address] [place] [address], [place] [address]. All rights reserved. This information is not intended as a substitute for professional medical care. Always follow your healthcare professional's instructions.      Take omeprazole 20 mg every morning on an empty stomach and wait 30 minutes until eating. Take every day for one month; either take 1/2 tablet daily for 2 weeks, or start taking every other day.  If symptoms persist for 2 weeks, increase dose to 40 mg daily.               Noelle Lundy MD  [facility]

## 2019-06-11 ENCOUNTER — OFFICE VISIT (OUTPATIENT)
Dept: FAMILY MEDICINE | Facility: CLINIC | Age: 39
End: 2019-06-11
Payer: COMMERCIAL

## 2019-06-11 VITALS
SYSTOLIC BLOOD PRESSURE: 113 MMHG | OXYGEN SATURATION: 100 % | WEIGHT: 138 LBS | DIASTOLIC BLOOD PRESSURE: 49 MMHG | RESPIRATION RATE: 16 BRPM | TEMPERATURE: 97.8 F | HEART RATE: 68 BPM | BODY MASS INDEX: 23.69 KG/M2

## 2019-06-11 DIAGNOSIS — K21.9 GASTROESOPHAGEAL REFLUX DISEASE WITHOUT ESOPHAGITIS: Primary | ICD-10-CM

## 2019-06-11 PROCEDURE — 99213 OFFICE O/P EST LOW 20 MIN: CPT | Performed by: FAMILY MEDICINE

## 2019-06-11 NOTE — PATIENT INSTRUCTIONS
Patient Education     Tips to Control Acid Reflux    To control acid reflux, you ll need to make some basic diet and lifestyle changes. The simple steps outlined below may be all you ll need to ease discomfort.  Watch what you eat    Avoid fatty foods and spicy foods.    Eat fewer acidic foods, such as citrus and tomato-based foods. These can increase symptoms.    Limit drinking alcohol, caffeine, and fizzy beverages. All increase acid reflux.    Try limiting chocolate, peppermint, and spearmint. These can worsen acid reflux in some people.  Watch when you eat    Avoid lying down for 3 hours after eating.    Do not snack before going to bed.  Raise your head  Raising your head and upper body by 4 to 6 inches helps limit reflux when you re lying down. Put blocks under the head of your bed frame to raise it.  Other changes    Lose weight, if you need to    Don t exercise near bedtime    Avoid tight-fitting clothes    Limit aspirin and ibuprofen    Stop smoking   Date Last Reviewed: 7/1/2016 2000-2018 [employer]. [address] [place] [address], [place] [id number]. All rights reserved. This information is not intended as a substitute for professional medical care. Always follow your healthcare professional's instructions.      Take omeprazole 20 mg every morning on an empty stomach and wait 30 minutes until eating. Take every day for one month; either take 1/2 tablet daily for 2 weeks, or start taking every other day.  If symptoms persist for 2 weeks, increase dose to 40 mg daily.

## 2019-10-31 ENCOUNTER — OFFICE VISIT (OUTPATIENT)
Dept: FAMILY MEDICINE | Facility: CLINIC | Age: 39
End: 2019-10-31
Payer: COMMERCIAL

## 2019-10-31 VITALS
HEIGHT: 64 IN | DIASTOLIC BLOOD PRESSURE: 60 MMHG | WEIGHT: 140 LBS | RESPIRATION RATE: 16 BRPM | TEMPERATURE: 98 F | OXYGEN SATURATION: 98 % | BODY MASS INDEX: 23.9 KG/M2 | SYSTOLIC BLOOD PRESSURE: 100 MMHG | HEART RATE: 69 BPM

## 2019-10-31 DIAGNOSIS — Z23 NEED FOR PROPHYLACTIC VACCINATION AND INOCULATION AGAINST INFLUENZA: ICD-10-CM

## 2019-10-31 DIAGNOSIS — K21.9 GASTROESOPHAGEAL REFLUX DISEASE WITHOUT ESOPHAGITIS: Primary | ICD-10-CM

## 2019-10-31 PROCEDURE — 90686 IIV4 VACC NO PRSV 0.5 ML IM: CPT | Performed by: FAMILY MEDICINE

## 2019-10-31 PROCEDURE — 99213 OFFICE O/P EST LOW 20 MIN: CPT | Mod: 25 | Performed by: FAMILY MEDICINE

## 2019-10-31 PROCEDURE — 90471 IMMUNIZATION ADMIN: CPT | Performed by: FAMILY MEDICINE

## 2019-10-31 ASSESSMENT — MIFFLIN-ST. JEOR: SCORE: 1290.04

## 2019-10-31 NOTE — PATIENT INSTRUCTIONS
Step 1: Tums you can multiple times per day every day, and you can take with the other 2.    Take daily for at least one month, then wean slowly when done  Step 2:  H2 blockers; ranitidine, cimetidine, famotidine    Or    Step 3:  Proton pump inhibitors  Take on empty stomach and wait at least 30 minutes before eating  Lansoprazole, emoprazole    Common food allergies:  Gluten  Dairy (milk sugar lactose vs milk protein casein)  Corn (corn syrup etc)

## 2019-10-31 NOTE — PROGRESS NOTES
Subjective     Allyn Mcqueen is a 38 year old female who presents to clinic today for the following health issues:    HPI     GERD/Heartburn       Duration: x 1 and half  year, on and off, has worsened since having 2nd baby who is about 12 months old now    Description (location/character/radiation): chest, can radiate up toward the neck if severe.    Intensity:  Depends, can be moderate to severe    Accompanying signs and symptoms:  food getting stuck: no   nausea/vomiting/blood: nausea    abdominal pain: YES- when it gets really bad; indigestion type pain/discomfort. Denies any constipation or diarrhea.  black/tarry or bloody stools: no :     History (similar episodes/previous evaluation): None    Precipitating or alleviating factors:  worse with fatty foods, spicy foods, caffeinated drinks, alcohol and peppermint.   Stress also exacerbates symptoms.  current NSAID/Aspirin use: no     Therapies tried and outcome: Has been taking Prilosec for one month  BID pretty regularly, especially when more stressful at work. Uses Tums if symptoms are mild, which help. She does sit up after meals most of the time- only time she lays down after eating is if she is up overnight breastfeeding and eats a snack before going back to bed.         Patient Active Problem List   Diagnosis     CARDIOVASCULAR SCREENING; LDL GOAL LESS THAN 160     Instability of pelvis or thigh joint     Back pain     Past Surgical History:   Procedure Laterality Date     C SPINAL FUSION,ANT,EA ADNL LEVEL      fused cervical vertebrae ?c3-4       Social History     Tobacco Use     Smoking status: Never Smoker     Smokeless tobacco: Never Used   Substance Use Topics     Alcohol use: Yes     Alcohol/week: 0.0 standard drinks     Comment: occ     Family History   Problem Relation Age of Onset     Respiratory Mother         COPD     Psychotic Disorder Mother         Bi polar     Mental Illness Mother         diagnosed bipolar; depression     Lipids Father   "    Heart Disease Maternal Grandmother      Cerebrovascular Disease Maternal Grandmother      Cancer Paternal Aunt         breast 60s         Allergies   Allergen Reactions     Adhesive Tape Rash     Penicillins Rash     Rash on Augmentin     BP Readings from Last 3 Encounters:   10/31/19 100/60   06/11/19 113/49   03/29/19 107/75    Wt Readings from Last 3 Encounters:   10/31/19 63.5 kg (140 lb)   06/11/19 62.6 kg (138 lb)   03/29/19 61.7 kg (136 lb)           Reviewed and updated as needed this visit by Provider  Problems         Review of Systems   ROS COMP: Constitutional, HEENT, cardiovascular, pulmonary, gi and gu systems are negative, except as otherwise noted.      Objective    /60 (BP Location: Right arm, Patient Position: Sitting, Cuff Size: Adult Regular)   Pulse 69   Temp 98  F (36.7  C) (Oral)   Resp 16   Ht 1.626 m (5' 4\")   Wt 63.5 kg (140 lb)   SpO2 98%   BMI 24.03 kg/m    Body mass index is 24.03 kg/m .  Physical Exam   GENERAL: healthy, alert and no distress  ABDOMEN: soft, nontender, no hepatosplenomegaly, no masses and bowel sounds normal  NEURO: Normal strength and tone, mentation intact and speech normal  PSYCH: mentation appears normal, affect normal/bright         ASSESSMENT / PLAN:  (K21.9) Gastroesophageal reflux disease without esophagitis  Comment: discussed diagnosis, possibly hiatal hernia given persistence of symptoms, discussed possibly doing elimination diet vs checking for food allergies, possible follow up with GI  All orders below ordered as future, she will decide and follow up as desires  For now will use PPI or H2 blocker daily as needed for exaccerbation; recommend staying on one of these medications for one month and then slowly taperin off if she has a flare, and tums prn  Plan: GASTROENTEROLOGY ADULT REF CONSULT ONLY,         Allergy adult food panel        The patient indicates understanding of these issues and agrees with the plan.       Patient Instructions "   Step 1: Tums you can multiple times per day every day, and you can take with the other 2.    Take daily for at least one month, then wean slowly when done  Step 2:  H2 blockers; ranitidine, cimetidine, famotidine    Or    Step 3:  Proton pump inhibitors  Take on empty stomach and wait at least 30 minutes before eating  Lansoprazole, emoprazole    Common food allergies:  Gluten  Dairy (milk sugar lactose vs milk protein casein)  Corn (corn syrup etc)        (Z23) Need for prophylactic vaccination and inoculation against influenza  (primary encounter diagnosis)  Comment: done today  Plan: INFLUENZA VACCINE IM > 6 MONTHS VALENT IIV4         [15624], Vaccine Administration, Initial         [71626]

## 2019-11-06 NOTE — TELEPHONE ENCOUNTER
RECORDS RECEIVED FROM: Internal    DATE RECEIVED: 2/13/20   NOTES STATUS DETAILS   OFFICE NOTE from referring provider Internal Internal referral    OFFICE NOTE from other specialist N/A    DISCHARGE SUMMARY from hospital N/A    OPERATIVE REPORT N/A    MEDICATION LIST Internal         ENDOSCOPY  N/A    COLONOSCOPY N/A    ERCP N/A    EUS N/A    STOOL TESTING N/A    PERTINENT LABS Internal    PATHOLOGY REPORTS (RELATED) N/A    IMAGING (CT, MRI, EGD) N/A      REFERRAL INFORMATION    Date referral was placed: 2/13/20   Date all records received: N/A   Date records were scanned into Epic: N/A   Date records were sent to Provider to review: N/A   Date and recommendation received from provider:  LETTER SENT  SCHEDULE APPOINTMENT   Date patient was contacted to schedule: 11/5/19

## 2019-11-13 ENCOUNTER — TRANSFERRED RECORDS (OUTPATIENT)
Dept: HEALTH INFORMATION MANAGEMENT | Facility: CLINIC | Age: 39
End: 2019-11-13

## 2019-11-27 ENCOUNTER — TRANSFERRED RECORDS (OUTPATIENT)
Dept: HEALTH INFORMATION MANAGEMENT | Facility: CLINIC | Age: 39
End: 2019-11-27

## 2019-12-04 ENCOUNTER — TRANSFERRED RECORDS (OUTPATIENT)
Dept: HEALTH INFORMATION MANAGEMENT | Facility: CLINIC | Age: 39
End: 2019-12-04

## 2019-12-06 ENCOUNTER — TRANSFERRED RECORDS (OUTPATIENT)
Dept: HEALTH INFORMATION MANAGEMENT | Facility: CLINIC | Age: 39
End: 2019-12-06

## 2020-01-23 ENCOUNTER — TRANSFERRED RECORDS (OUTPATIENT)
Dept: HEALTH INFORMATION MANAGEMENT | Facility: CLINIC | Age: 40
End: 2020-01-23

## 2020-01-27 ENCOUNTER — TRANSFERRED RECORDS (OUTPATIENT)
Dept: HEALTH INFORMATION MANAGEMENT | Facility: CLINIC | Age: 40
End: 2020-01-27

## 2020-02-11 ASSESSMENT — ENCOUNTER SYMPTOMS
RECTAL PAIN: 1
SINUS PAIN: 1
BLOATING: 1
NECK MASS: 0
CONSTIPATION: 1
ABDOMINAL PAIN: 1
BOWEL INCONTINENCE: 0
BLOOD IN STOOL: 0
PANIC: 0
NAUSEA: 1
TROUBLE SWALLOWING: 0
DEPRESSION: 0
DIARRHEA: 1
JAUNDICE: 0
SMELL DISTURBANCE: 1
HEARTBURN: 1
DECREASED LIBIDO: 0
SINUS CONGESTION: 1
SORE THROAT: 1
VOMITING: 0
TASTE DISTURBANCE: 0
HOT FLASHES: 0
INSOMNIA: 0
DECREASED CONCENTRATION: 0
NERVOUS/ANXIOUS: 1
HOARSE VOICE: 0

## 2020-02-13 ENCOUNTER — PRE VISIT (OUTPATIENT)
Dept: GASTROENTEROLOGY | Facility: CLINIC | Age: 40
End: 2020-02-13

## 2020-02-13 ENCOUNTER — OFFICE VISIT (OUTPATIENT)
Dept: GASTROENTEROLOGY | Facility: CLINIC | Age: 40
End: 2020-02-13
Attending: FAMILY MEDICINE
Payer: COMMERCIAL

## 2020-02-13 VITALS
BODY MASS INDEX: 24.12 KG/M2 | OXYGEN SATURATION: 97 % | WEIGHT: 141.3 LBS | SYSTOLIC BLOOD PRESSURE: 111 MMHG | HEIGHT: 64 IN | HEART RATE: 77 BPM | DIASTOLIC BLOOD PRESSURE: 71 MMHG

## 2020-02-13 DIAGNOSIS — K21.9 GASTROESOPHAGEAL REFLUX DISEASE, ESOPHAGITIS PRESENCE NOT SPECIFIED: Primary | ICD-10-CM

## 2020-02-13 ASSESSMENT — PAIN SCALES - GENERAL: PAINLEVEL: NO PAIN (0)

## 2020-02-13 ASSESSMENT — MIFFLIN-ST. JEOR: SCORE: 1290.93

## 2020-02-13 NOTE — LETTER
2/13/2020     RE: Allyn Mcqueen  [address]  [place] [address]     Dear Colleague,    Thank you for referring your patient, Allyn Mcqueen, to the [facility] GASTROENTEROLOGY AND IBD CLINIC at [facility]. Please see a copy of my visit note below.    GI CLINIC VISIT    CC/REFERRING MD:  Noelle Lundy  REASON FOR CONSULTATION: GERD and irregular bowel pattern    ASSESSMENT/PLAN:  39-year-old female with past medical history to include anxiety, ruptured ovarian cyst, palpitations, who presents to the GI clinic for consultation regarding GERD.  Patient reports that ever since her second child she has had increasing symptoms of gastroesophageal reflux disease.     1.  GERD without esophagitis: We had an extensive discussion regarding the necessity of ongoing PPI use and how to best manage symptoms.  I think that there are several factors that seem to aggravate patient symptoms.  Although serologies do not suggest celiac disease, her duodenal biopsies are suspicious of at least gluten sensitivity.  There was an improvement in her bloating and regulation of her bowel pattern.  If patient is interested in minimizing antiacid medications, she may benefit from going gluten-free, improving her bowel pattern (decreased constipation) and in turn help manage her GERD symptoms.  Additionally patient has increased anxiety and certainly could be contributing to her reflux.  I would highly recommend the patient meet with our GI health psychologist to work through strategies in managing these symptoms.  If patient is interested in discontinuing proton pump inhibitor, she may experience hyperacid episode shortly following, but she could also try an H2 blocker as needed to help with management of symptom such as Pepcid 20 mg twice daily.  Additionally we discussed lifestyle modifications to include foods to avoid, avoiding eating late at night, small meals throughout the day.  We  also discussed if she chose to continue her proton pump inhibitor what the risk and benefits of ongoing PPI use and proper monitoring.  -- Trial again of Gluten free  -- Regular use of citrucel and if needed Miralax  -- Food and symptom journal  -- GI health psychologist referral  -- Consider dietitian referral  -- Trial of H2 blocker with pepcid 20 mg twice daily as needed  -- Can consider trial off of prilosec 20 mg daily.     Reflux/heartburn/GERD relief:  1) Prop actual bed up, not just with pillows.  Propping up with just pillows can actually make things worse if it is causing you to bend at the waist while sleeping  2) Avoid alcohol, as this causes relaxation of the sphincter and makes it easier for food to travel up esophagus.  If you do drink alcohol, do not drink before bed or before meals.  3)  Eat small meals. Avoid overeating.  4) Avoid triggers such as fatty foods, processed foods, and high fructose corn syrup (or food that contain these)    2.  Constipation: This may be her reaction to gluten exposure given her flatulence and regularity improved with a gluten-free diet.  Patient has also seen an improvement when she uses Citrucel or any soluble fiber.  I recommend using this on a regular basis to assist in regularity in addition to following a gluten-free diet.  --Retrial of gluten-free  --Citrucel on a regular basis    3. Colorectal cancer screening: No family history of colorectal cancer.  Recommend colonoscopy at the age of 45-50 unless symptomatic sooner.    RTC 3 months    Thank you for this consultation.  It was a pleasure to participate in the care of this patient; please contact us with any further questions.       Brian Michel PA-C  Division of Gastroenterology, Hepatology and Nutrition  [facility]      HPI  39-year-old female with past medical history to include anxiety, ruptured ovarian cyst, palpitations, who presents to the GI clinic for consultation regarding GERD.  Patient  reports that ever since her second child she has had increasing symptoms of gastroesophageal reflux disease.  She noted specific triggers to include alcohol, caffeine, tomatoes, spicy foods, fatty foods and onions to be particularly offensive.  She denies any dysphasia or odynophagia.  A trial of Prilosec 20 mg daily was helpful and upon discontinuation had return of symptoms so restarted omeprazole 20 mg daily.  She denies any vomiting or nausea.  She also has a baseline of constipation since a child that is described as excessive bloating and often skipping days without bowel movements.  Occasional excessive straining.  Citrucel has significantly helped with this, but she is currently not taking this on a regular basis.    She had been evaluated at [facility] who performed an upper endoscopy.  No evidence of esophagitis, but did have questionable findings of celiac to include increased intraepithelial lymphocytes and marsh to villous atrophy and crypt hyperplasia.  CBC and iron studies normal.  Vitamin D was low at 21.  TTG IgA and IgG, total IgA, DGP IgA and IgG were within normal limits.    A trial of gluten-free diet improved bloating and improved regulation of bowel pattern.  She did not continue with gluten-free diet due to minimal improvement in other symptoms such as reflux.    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     Priority: Medium     Back  pain 12/19/2017     Priority: Medium     CARDIOVASCULAR SCREENING; LDL GOAL LESS THAN 160 10/31/2010     Priority: Medium       PERTINENT PAST MEDICAL HISTORY:  Past Medical History:   Diagnosis Date     Anxiety     worse post-partum     Esophageal reflux 2018    started during pregnancy; got worse year later     Ovarian cyst rupture 04/2010     Palpitations     off and on for years; more off now     Stomach problems 2018    started post-pregnancy in 2018       PREVIOUS SURGERIES:  Past Surgical History:   Procedure Laterality Date     C SPINAL FUSION,ANT,EA ADNL LEVEL      fused cervical vertebrae ?c3-4       PREVIOUS ENDOSCOPY:  EGD normal, gastric biopsies revealed mild reactive gastropathy, duodenal biopsies demonstrate changes suspicious for celiac disease including increased intraepithelial lymphocytes and marsh to villous atrophy and crypt hyperplasia.    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)  Social History     Socioeconomic History     Marital status:      Spouse name: Not on file     Number of children: Not on file     Years of education: Not on file     Highest education level: Not on file   Occupational History     Employer: [employer]     Comment: organizer [employer]   Social Needs     Financial resource strain: Not on file     Food insecurity:     Worry: Not on file     Inability: Not on file     Transportation needs:     Medical: Not on file     Non-medical: Not on file   Tobacco Use     Smoking status: Never Smoker     Smokeless tobacco: Never Used   Substance and Sexual Activity     Alcohol use: Yes     Alcohol/week: 0.0 standard drinks     Comment: occ     Drug use: No     Sexual activity: Yes      Partners: Male     Birth control/protection: I.U.D.   Lifestyle     Physical activity:     Days per week: Not on file     Minutes per session: Not on file     Stress: Not on file   Relationships     Social connections:     Talks on phone: Not on file     Gets together: Not on file     Attends Pentecostalism service: Not on file     Active member of club or organization: Not on file     Attends meetings of clubs or organizations: Not on file     Relationship status: Not on file     Intimate partner violence:     Fear of current or ex partner: Not on file     Emotionally abused: Not on file     Physically abused: Not on file     Forced sexual activity: Not on file   Other Topics Concern     Parent/sibling w/ CABG, MI or angioplasty before 65F 55M? No   Social History Narrative    Caffeine intake/servings daily - 0    Calcium intake/servings daily - 3    Exercise 3 times weekly - describe yoga, runs, bikes    Sunscreen used - Yes    Seatbelts used - Yes    Guns stored in the home - Yes    Self Breast Exam - Yes    Pap test up to date -  Yes    Eye exam up to date -  Yes    Dental exam up to date -  Yes    DEXA scan up to date -  No    Flex Sig/Colonoscopy up to date -  No    Mammography up to date -  No    Immunizations reviewed and up to date - Yes    Abuse: Current or Past (Physical, Sexual or Emotional) - No    Do you feel safe in your environment - Yes    Do you cope well with stress - Yes    Do you suffer from insomnia - No    Last updated by: Betzaida Mckeon  12/2/2014           FAMILY HISTORY:  FH of CRC: None   FH of IBD: None  No Fhx of celiac  Family History   Problem Relation Age of Onset     Respiratory Mother         COPD     Psychotic Disorder Mother         Bi polar     Mental Illness Mother         diagnosed bipolar; depression     Lipids Father      Heart Disease Maternal Grandmother      Cerebrovascular Disease Maternal Grandmother      Cancer Paternal Aunt         breast 60s  "      Past/family/social history reviewed and no changes    PHYSICAL EXAMINATION:  Constitutional: aaox3, cooperative, pleasant, not dyspneic/diaphoretic, no acute distress  Vitals reviewed: /71   Pulse 77   Ht 1.626 m (5' 4\")   Wt 64.1 kg (141 lb 4.8 oz)   SpO2 97%   BMI 24.25 kg/m     Wt:   Wt Readings from Last 2 Encounters:   02/13/20 64.1 kg (141 lb 4.8 oz)   10/31/19 63.5 kg (140 lb)      Eyes: Sclera anicteric/injected  Ears/nose/mouth/throat: Normal oropharynx without ulcers or exudate, mucus membranes moist, hearing intact  Neck: supple, thyroid normal size  CV: No edema  Respiratory: Unlabored breathing  Lymph: No axillary, submandibular, supraclavicular or inguinal lymphadenopathy  Abd: Nondistended, +bs, no hepatosplenomegaly, nontender, no peritoneal signs  Skin: warm, perfused, no jaundice  Psych: Normal affect  MSK: Normal gait      PERTINENT STUDIES:    Prenatal Office Visit on 04/18/2018   Component Date Value Ref Range Status     Beta HCG Qual IFA Urine 04/18/2018 Negative  NEG^Negative    Final     PAP 04/18/2018 NIL   Final     Copath Report 04/18/2018    Final                    Value:  Patient Name: SHRUTHI CHERY  MR#: [id number]  Specimen #: [id number]  Collected: 4/18/2018  Received: 4/18/2018  Reported: 4/20/2018 11:19  Ordering Phy(s): FRANK QUAN    For improved result formatting, select 'View Enhanced Report Format' under   Linked Documents section.    SPECIMEN/STAIN PROCESS:  Pap imaged thin layer prep screening (Surepath, FocalPoint with guided   screening)       Pap-Cyto x 1, HPV ordered x 1    SOURCE: Cervical, endocervical  ----------------------------------------------------------------   Pap imaged thin layer prep screening (Surepath, FocalPoint with guided   screening)  SPECIMEN ADEQUACY:  Satisfactory for evaluation.  -Transformation zone component absent.    CYTOLOGIC INTERPRETATION:    Negative for intraepithelial lesion or malignancy    Electronically signed " out by:  ANUSHKA Ballesteros (ASCP)    Processed and screened at [facility]    CLINICAL HISTORY:    Post-partum, Previous normal pa                          p  Date of Last Pap: 8/25/15,    Papanicolaou Test Limitations:  Cervical cytology is a screening test with   limited sensitivity; regular  screening is critical for cancer prevention; Pap tests are primarily   effective for the diagnosis/prevention of  squamous cell carcinoma, not adenocarcinomas or other cancers.    TESTING LAB LOCATION:  [address]  [phone]    COLLECTION SITE:  Client:  [facility]  Location: [facility] (B)       HPV Source 04/18/2018 SurePath   Final     HPV 16 DNA 04/18/2018 Negative  NEG^Negative Final     HPV 18 DNA 04/18/2018 Negative  NEG^Negative Final     Other HR HPV 04/18/2018 Negative  NEG^Negative Final     Final Diagnosis 04/18/2018 This patient's sample is negative for HPV DNA.   Final     Specimen Description 04/18/2018 Cervical Cells   Final         Answers for HPI/ROS submitted by the patient on 2/11/2020   General Symptoms: No  Skin Symptoms: No  HENT Symptoms: Yes  EYE SYMPTOMS: No  HEART SYMPTOMS: No  LUNG SYMPTOMS: No  INTESTINAL SYMPTOMS: Yes  URINARY SYMPTOMS: No  GYNECOLOGIC SYMPTOMS: Yes  BREAST SYMPTOMS: No  SKELETAL SYMPTOMS: No  BLOOD SYMPTOMS: No  NERVOUS SYSTEM SYMPTOMS: No  MENTAL HEALTH SYMPTOMS: Yes  Ear pain: No  Ear discharge: No  Hearing loss: No  Tinnitus: No  Nosebleeds: No  Congestion: Yes  Sinus pain: Yes  Trouble swallowing: No   Voice hoarseness: No  Mouth sores: No  Sore throat: Yes  Tooth pain: No  Gum tenderness: No  Bleeding gums: No  Change in taste: No  Change in sense of smell: Yes  Dry mouth: No  Hearing aid used: No  Neck lump: No  Heart burn or indigestion: Yes  Nausea: Yes  Vomiting: No  Abdominal pain: Yes  Bloating:  Yes  Constipation: Yes  Diarrhea: Yes  Blood in stool: No  Black stools: No  Rectal or Anal pain: Yes  Fecal incontinence: No  Yellowing of skin or eyes: No  Vomit with blood: No  Change in stools: No  Bleeding or spotting between periods: No  Heavy or painful periods: No  Irregular periods: No  Vaginal discharge: Yes  Hot flashes: No  Vaginal dryness: No  Genital ulcers: No  Reduced libido: No  Painful intercourse: No  Difficulty with sexual arousal: No  Post-menopausal bleeding: No  Nervous or Anxious: Yes  Depression: No  Trouble sleeping: No  Trouble thinking or concentrating: No  Mood changes: No  Panic attacks: No    Again, thank you for allowing me to participate in the care of your patient.      Sincerely,    Brian Michel PA-C

## 2020-02-13 NOTE — PATIENT INSTRUCTIONS
It was a pleasure taking care of you today.  I've included a brief summary of our discussion and care plan from today's visit below.  Please review this information with your primary care provider.  ______________________________________________________________________    My recommendations are summarized as follows:    -- Trial again of Gluten free  -- Regular use of citrucel and if needed Miralax  -- Food and symptom journal  -- GI health psychologist referral  -- Consider dietitian referral  -- Trial of H2 blocker with pepcid 20 mg twice daily as needed  -- Can consider trial off of prilosec 20 mg daily.     Reflux/heartburn/GERD relief:  1) Prop actual bed up, not just with pillows.  Propping up with just pillows can actually make things worse if it is causing you to bend at the waist while sleeping  2) Avoid alcohol, as this causes relaxation of the sphincter and makes it easier for food to travel up esophagus.  If you do drink alcohol, do not drink before bed or before meals.  3)  Eat small meals. Avoid overeating.  4) Avoid triggers such as fatty foods, processed foods, and high fructose corn syrup (or food that contain these)      Return to GI Clinic in 3 months or as needed to review your progress.    ______________________________________________________________________    Who do I call with any questions after my visit?  Please be in touch if there are any further questions that arise following today's visit.  There are multiple ways to contact your gastroenterology care team.        During business hours, you may reach a Gastroenterology nurse at [phone], option 3.       To schedule or reschedule an appointment, please call [phone].       You can always send a secure message through [employer].  [employer] messages are answered by your nurse or doctor typically within 24 hours.  Please allow extra time on weekends and holidays.        For urgent/emergent questions after business hours, you may reach  the on-call GI Fellow by contacting the [facility]  at [phone].      In order for your refill to be processed in a timely fashion, it is your responsibility to ensure you follow the recommendations from your provider regarding your laboratory studies and follow up appointments.       How will I get the results of any tests ordered?    You will receive all of your results.  If you have signed up for [employer], any tests ordered at your visit will be available to you after your physician reviews them.  Typically this takes 1-2 weeks.  If there are urgent results that require a change in your care plan, your physician or nurse will call you to discuss the next steps.      What is Mahalo?  Mahalo is a secure way for you to access all of your healthcare records from the [facility].  It is a web based computer program, so you can sign on to it from any location.  It also allows you to send secure messages to your care team.  I recommend signing up for Mahalo access if you have not already done so and are comfortable with using a computer.      How to I schedule a follow-up visit?  If you did not schedule a follow-up visit today, please call [phone] to schedule a follow-up office visit.        Sincerely,    Brian Michel PA-C  [facility]  Division of Gastroenterology

## 2020-02-13 NOTE — NURSING NOTE
"Chief Complaint   Patient presents with     New Patient     New consult reflux       Vitals:    02/13/20 0919   BP: 111/71   Pulse: 77   SpO2: 97%   Weight: 64.1 kg (141 lb 4.8 oz)   Height: 1.626 m (5' 4\")       Body mass index is 24.25 kg/m .    Annette Osorio CMA    "

## 2020-02-13 NOTE — PROGRESS NOTES
GI CLINIC VISIT    CC/REFERRING MD:  Noelle Lundy  REASON FOR CONSULTATION: GERD and irregular bowel pattern    ASSESSMENT/PLAN:  39-year-old female with past medical history to include anxiety, ruptured ovarian cyst, palpitations, who presents to the GI clinic for consultation regarding GERD.  Patient reports that ever since her second child she has had increasing symptoms of gastroesophageal reflux disease.     1.  GERD without esophagitis: We had an extensive discussion regarding the necessity of ongoing PPI use and how to best manage symptoms.  I think that there are several factors that seem to aggravate patient symptoms.  Although serologies do not suggest celiac disease, her duodenal biopsies are suspicious of at least gluten sensitivity.  There was an improvement in her bloating and regulation of her bowel pattern.  If patient is interested in minimizing antiacid medications, she may benefit from going gluten-free, improving her bowel pattern (decreased constipation) and in turn help manage her GERD symptoms.  Additionally patient has increased anxiety and certainly could be contributing to her reflux.  I would highly recommend the patient meet with our GI health psychologist to work through strategies in managing these symptoms.  If patient is interested in discontinuing proton pump inhibitor, she may experience hyperacid episode shortly following, but she could also try an H2 blocker as needed to help with management of symptom such as Pepcid 20 mg twice daily.  Additionally we discussed lifestyle modifications to include foods to avoid, avoiding eating late at night, small meals throughout the day.  We also discussed if she chose to continue her proton pump inhibitor what the risk and benefits of ongoing PPI use and proper monitoring.  -- Trial again of Gluten free  -- Regular use of citrucel and if needed Miralax  -- Food and symptom journal  -- GI health psychologist referral  -- Consider  dietitian referral  -- Trial of H2 blocker with pepcid 20 mg twice daily as needed  -- Can consider trial off of prilosec 20 mg daily.     Reflux/heartburn/GERD relief:  1) Prop actual bed up, not just with pillows.  Propping up with just pillows can actually make things worse if it is causing you to bend at the waist while sleeping  2) Avoid alcohol, as this causes relaxation of the sphincter and makes it easier for food to travel up esophagus.  If you do drink alcohol, do not drink before bed or before meals.  3)  Eat small meals. Avoid overeating.  4) Avoid triggers such as fatty foods, processed foods, and high fructose corn syrup (or food that contain these)    2.  Constipation: This may be her reaction to gluten exposure given her flatulence and regularity improved with a gluten-free diet.  Patient has also seen an improvement when she uses Citrucel or any soluble fiber.  I recommend using this on a regular basis to assist in regularity in addition to following a gluten-free diet.  --Retrial of gluten-free  --Citrucel on a regular basis    3. Colorectal cancer screening: No family history of colorectal cancer.  Recommend colonoscopy at the age of 45-50 unless symptomatic sooner.    RTC 3 months    Thank you for this consultation.  It was a pleasure to participate in the care of this patient; please contact us with any further questions.       Brian Michel PA-C  Division of Gastroenterology, Hepatology and Nutrition  [facility]      HPI  39-year-old female with past medical history to include anxiety, ruptured ovarian cyst, palpitations, who presents to the GI clinic for consultation regarding GERD.  Patient reports that ever since her second child she has had increasing symptoms of gastroesophageal reflux disease.  She noted specific triggers to include alcohol, caffeine, tomatoes, spicy foods, fatty foods and onions to be particularly offensive.  She denies any dysphasia or odynophagia.  A trial  of Prilosec 20 mg daily was helpful and upon discontinuation had return of symptoms so restarted omeprazole 20 mg daily.  She denies any vomiting or nausea.  She also has a baseline of constipation since a child that is described as excessive bloating and often skipping days without bowel movements.  Occasional excessive straining.  Citrucel has significantly helped with this, but she is currently not taking this on a regular basis.    She had been evaluated at [facility] who performed an upper endoscopy.  No evidence of esophagitis, but did have questionable findings of celiac to include increased intraepithelial lymphocytes and marsh to villous atrophy and crypt hyperplasia.  CBC and iron studies normal.  Vitamin D was low at 21.  TTG IgA and IgG, total IgA, DGP IgA and IgG were within normal limits.    A trial of gluten-free diet improved bloating and improved regulation of bowel pattern.  She did not continue with gluten-free diet due to minimal improvement in other symptoms such as reflux.    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     Priority: Medium     Back pain 12/19/2017     Priority: Medium     CARDIOVASCULAR SCREENING; LDL GOAL LESS THAN 160 10/31/2010     Priority: Medium       PERTINENT PAST MEDICAL HISTORY:  Past Medical History:   Diagnosis Date     Anxiety     worse post-partum     Esophageal reflux 2018    started during pregnancy; got  worse year later     Ovarian cyst rupture 04/2010     Palpitations     off and on for years; more off now     Stomach problems 2018    started post-pregnancy in 2018       PREVIOUS SURGERIES:  Past Surgical History:   Procedure Laterality Date     C SPINAL FUSION,ANT,EA ADNL LEVEL      fused cervical vertebrae ?c3-4       PREVIOUS ENDOSCOPY:  EGD normal, gastric biopsies revealed mild reactive gastropathy, duodenal biopsies demonstrate changes suspicious for celiac disease including increased intraepithelial lymphocytes and marsh to villous atrophy and crypt hyperplasia.    ALLERGIES:     Allergies   Allergen Reactions     Adhesive Tape Rash     Penicillins Rash     Rash on Augmentin       PERTINENT MEDICATIONS:    Current Outpatient Medications:      omeprazole (PRILOSEC) 20 MG DR capsule, Take 1 capsule (20 mg) by mouth daily, Disp: 30 capsule, Rfl: 3    SOCIAL HISTORY:  Works for [employer] ([employer]  Social History     Socioeconomic History     Marital status:      Spouse name: Not on file     Number of children: Not on file     Years of education: Not on file     Highest education level: Not on file   Occupational History     Employer: [employer]     Comment: organizer [employer]   Social Needs     Financial resource strain: Not on file     Food insecurity:     Worry: Not on file     Inability: Not on file     Transportation needs:     Medical: Not on file     Non-medical: Not on file   Tobacco Use     Smoking status: Never Smoker     Smokeless tobacco: Never Used   Substance and Sexual Activity     Alcohol use: Yes     Alcohol/week: 0.0 standard drinks     Comment: occ     Drug use: No     Sexual activity: Yes     Partners: Male     Birth control/protection: I.U.D.   Lifestyle     Physical activity:     Days per week: Not on file     Minutes per session: Not on file     Stress: Not on file   Relationships     Social connections:     Talks on phone: Not on file     Gets together: Not on file      "Attends Druze service: Not on file     Active member of club or organization: Not on file     Attends meetings of clubs or organizations: Not on file     Relationship status: Not on file     Intimate partner violence:     Fear of current or ex partner: Not on file     Emotionally abused: Not on file     Physically abused: Not on file     Forced sexual activity: Not on file   Other Topics Concern     Parent/sibling w/ CABG, MI or angioplasty before 65F 55M? No   Social History Narrative    Caffeine intake/servings daily - 0    Calcium intake/servings daily - 3    Exercise 3 times weekly - describe yoga, runs, bikes    Sunscreen used - Yes    Seatbelts used - Yes    Guns stored in the home - Yes    Self Breast Exam - Yes    Pap test up to date -  Yes    Eye exam up to date -  Yes    Dental exam up to date -  Yes    DEXA scan up to date -  No    Flex Sig/Colonoscopy up to date -  No    Mammography up to date -  No    Immunizations reviewed and up to date - Yes    Abuse: Current or Past (Physical, Sexual or Emotional) - No    Do you feel safe in your environment - Yes    Do you cope well with stress - Yes    Do you suffer from insomnia - No    Last updated by: Betzaida Mckeon  12/2/2014           FAMILY HISTORY:  FH of CRC: None   FH of IBD: None  No Fhx of celiac  Family History   Problem Relation Age of Onset     Respiratory Mother         COPD     Psychotic Disorder Mother         Bi polar     Mental Illness Mother         diagnosed bipolar; depression     Lipids Father      Heart Disease Maternal Grandmother      Cerebrovascular Disease Maternal Grandmother      Cancer Paternal Aunt         breast 60s       Past/family/social history reviewed and no changes    PHYSICAL EXAMINATION:  Constitutional: aaox3, cooperative, pleasant, not dyspneic/diaphoretic, no acute distress  Vitals reviewed: /71   Pulse 77   Ht 1.626 m (5' 4\")   Wt 64.1 kg (141 lb 4.8 oz)   SpO2 97%   BMI 24.25 kg/m    Wt: "   Wt Readings from Last 2 Encounters:   02/13/20 64.1 kg (141 lb 4.8 oz)   10/31/19 63.5 kg (140 lb)      Eyes: Sclera anicteric/injected  Ears/nose/mouth/throat: Normal oropharynx without ulcers or exudate, mucus membranes moist, hearing intact  Neck: supple, thyroid normal size  CV: No edema  Respiratory: Unlabored breathing  Lymph: No axillary, submandibular, supraclavicular or inguinal lymphadenopathy  Abd: Nondistended, +bs, no hepatosplenomegaly, nontender, no peritoneal signs  Skin: warm, perfused, no jaundice  Psych: Normal affect  MSK: Normal gait      PERTINENT STUDIES:    Prenatal Office Visit on 04/18/2018   Component Date Value Ref Range Status     Beta HCG Qual IFA Urine 04/18/2018 Negative  NEG^Negative    Final     PAP 04/18/2018 NIL   Final     Copath Report 04/18/2018    Final                    Value:  Patient Name: SHRUTHI CHERY  MR#: [id number]  Specimen #: [id number]  Collected: 4/18/2018  Received: 4/18/2018  Reported: 4/20/2018 11:19  Ordering Phy(s): FRANK QUAN    For improved result formatting, select 'View Enhanced Report Format' under   Linked Documents section.    SPECIMEN/STAIN PROCESS:  Pap imaged thin layer prep screening (Surepath, FocalPoint with guided   screening)       Pap-Cyto x 1, HPV ordered x 1    SOURCE: Cervical, endocervical  ----------------------------------------------------------------   Pap imaged thin layer prep screening (Surepath, FocalPoint with guided   screening)  SPECIMEN ADEQUACY:  Satisfactory for evaluation.  -Transformation zone component absent.    CYTOLOGIC INTERPRETATION:    Negative for intraepithelial lesion or malignancy    Electronically signed out by:  ANUSHKA Ballesteros (ASCP)    Processed and screened at [facility]    CLINICAL HISTORY:    Post-partum, Previous normal pa                          p  Date of Last Pap: 8/25/15,    Papanicolaou Test Limitations:  Cervical cytology is a  screening test with   limited sensitivity; regular  screening is critical for cancer prevention; Pap tests are primarily   effective for the diagnosis/prevention of  squamous cell carcinoma, not adenocarcinomas or other cancers.    TESTING LAB LOCATION:  [address]  [phone]    COLLECTION SITE:  Client:  [facility]  Location: [facility] (B)       HPV Source 04/18/2018 SurePath   Final     HPV 16 DNA 04/18/2018 Negative  NEG^Negative Final     HPV 18 DNA 04/18/2018 Negative  NEG^Negative Final     Other HR HPV 04/18/2018 Negative  NEG^Negative Final     Final Diagnosis 04/18/2018 This patient's sample is negative for HPV DNA.   Final     Specimen Description 04/18/2018 Cervical Cells   Final         Answers for HPI/ROS submitted by the patient on 2/11/2020   General Symptoms: No  Skin Symptoms: No  HENT Symptoms: Yes  EYE SYMPTOMS: No  HEART SYMPTOMS: No  LUNG SYMPTOMS: No  INTESTINAL SYMPTOMS: Yes  URINARY SYMPTOMS: No  GYNECOLOGIC SYMPTOMS: Yes  BREAST SYMPTOMS: No  SKELETAL SYMPTOMS: No  BLOOD SYMPTOMS: No  NERVOUS SYSTEM SYMPTOMS: No  MENTAL HEALTH SYMPTOMS: Yes  Ear pain: No  Ear discharge: No  Hearing loss: No  Tinnitus: No  Nosebleeds: No  Congestion: Yes  Sinus pain: Yes  Trouble swallowing: No   Voice hoarseness: No  Mouth sores: No  Sore throat: Yes  Tooth pain: No  Gum tenderness: No  Bleeding gums: No  Change in taste: No  Change in sense of smell: Yes  Dry mouth: No  Hearing aid used: No  Neck lump: No  Heart burn or indigestion: Yes  Nausea: Yes  Vomiting: No  Abdominal pain: Yes  Bloating: Yes  Constipation: Yes  Diarrhea: Yes  Blood in stool: No  Black stools: No  Rectal or Anal pain: Yes  Fecal incontinence: No  Yellowing of skin or eyes: No  Vomit with blood: No  Change in stools: No  Bleeding or spotting between periods: No  Heavy or painful periods: No  Irregular periods: No  Vaginal  discharge: Yes  Hot flashes: No  Vaginal dryness: No  Genital ulcers: No  Reduced libido: No  Painful intercourse: No  Difficulty with sexual arousal: No  Post-menopausal bleeding: No  Nervous or Anxious: Yes  Depression: No  Trouble sleeping: No  Trouble thinking or concentrating: No  Mood changes: No  Panic attacks: No

## 2020-03-01 ENCOUNTER — HEALTH MAINTENANCE LETTER (OUTPATIENT)
Age: 40
End: 2020-03-01

## 2020-04-05 ENCOUNTER — E-VISIT (OUTPATIENT)
Dept: FAMILY MEDICINE | Facility: CLINIC | Age: 40
End: 2020-04-05
Payer: COMMERCIAL

## 2020-04-05 DIAGNOSIS — U07.1 COVID-19: Primary | ICD-10-CM

## 2020-04-05 PROCEDURE — 99207 ZZC NO BILLABLE SERVICE THIS VISIT: CPT | Performed by: FAMILY MEDICINE

## 2020-04-06 ENCOUNTER — TELEPHONE (OUTPATIENT)
Dept: FAMILY MEDICINE | Facility: CLINIC | Age: 40
End: 2020-04-06

## 2020-04-06 NOTE — TELEPHONE ENCOUNTER
Reason for Call:  Other appointment    Detailed comments: patient is returning Dr. Mary Ellen Rob's phone call and wishes to discuss her symptoms.    Phone Number Patient can be reached at: Cell number on file:    Telephone Information:   Mobile [phone]       Best Time: any    Can we leave a detailed message on this number? YES    Call taken on 4/6/2020 at 10:41 AM by Alyse Saavedra

## 2020-04-06 NOTE — TELEPHONE ENCOUNTER
Provider E-Visit time total (minutes):           Symptoms are intermittent.   She had a pretty bad cold the week of March 9.   That got better and then symptoms started again one week later.   New symptoms she thought it was due to a sinus infection.   She then felt a little better.   Symptoms start again yesterday with a sore throat, swollen glands, little bit of headache, mild rhinorrhea, PND, mild ear pain and occasional dry cough.  No sneezing, nasal congestion, fever, shortness of breath or chest pain.   She has taken Ibuprofen or Tylenol for the swollen neck glands. She has been trying to rest.  She has occasionally had allergies.   Travelled to [place] a couple of weeks ago.   Kids have been out of  for a couple of weeks.   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   Discussed can not rule out COVID.  Advised patient to use oral or nasal decongestant. Ibuprofen BID PRN and COVID precautions below.     Please use the information at the end of this document to sign up for [employer] where you can get your results and a message about those results sent to you through the [employer] application. If you do not have mychart we will call you with your results but it may take longer.    Regardless of if you have been tested or not:  Patient who have symptoms (cough, fever, or shortness of breath), need to isolate for 7 days from when symptoms started AND 72 hours after fever resolves (without fever reducing medications) AND improvement of respiratory symptoms (whichever is longer).      Isolate yourself at home (in own room/own bathroom if possible)    Do Not allow any visitors    Do Not go to work or school    Do Not go to [facility],  centers, shopping, or other public places.    Do Not shake hands.    Avoid close and intimate contact with others (hugging, kissing).    Follow CDC recommendations for household cleaning of frequently touched services.     After the initial 7 days,  continue to isolate yourself from household members as much as possible. To continue decrease the risk of community spread and exposure, you and any members of your household should limit activities in public for 14 days after starting home isolation.     You can reference the following CDC link for helpful home isolation/care tips:  https://www.cdc.gov/coronavirus/2019-ncov/downloads/10Things.pdf    Protect Others:    Cover Your Mouth and Nose with a mask, disposable tissue or wash cloth to avoid spreading germs to others.    Wash your hands and face frequently with soap and water    Call Back If: Breathing difficulty develops or you become worse.    For more information about COVID19 and options for caring for yourself at home, please visit the CDC website at https://www.cdc.gov/coronavirus/2019-ncov/about/steps-when-sick.html  For more options for care at [facility], please visit our website at [email or web address]

## 2020-10-16 ENCOUNTER — OFFICE VISIT (OUTPATIENT)
Dept: FAMILY MEDICINE | Facility: CLINIC | Age: 40
End: 2020-10-16
Payer: COMMERCIAL

## 2020-10-16 VITALS
HEIGHT: 64 IN | DIASTOLIC BLOOD PRESSURE: 72 MMHG | RESPIRATION RATE: 16 BRPM | TEMPERATURE: 97.9 F | HEART RATE: 82 BPM | OXYGEN SATURATION: 99 % | BODY MASS INDEX: 24.41 KG/M2 | WEIGHT: 143 LBS | SYSTOLIC BLOOD PRESSURE: 122 MMHG

## 2020-10-16 DIAGNOSIS — Z00.00 ROUTINE GENERAL MEDICAL EXAMINATION AT A HEALTH CARE FACILITY: ICD-10-CM

## 2020-10-16 DIAGNOSIS — Z23 NEED FOR PROPHYLACTIC VACCINATION AND INOCULATION AGAINST INFLUENZA: ICD-10-CM

## 2020-10-16 PROCEDURE — 90471 IMMUNIZATION ADMIN: CPT | Performed by: FAMILY MEDICINE

## 2020-10-16 PROCEDURE — 99396 PREV VISIT EST AGE 40-64: CPT | Mod: 25 | Performed by: FAMILY MEDICINE

## 2020-10-16 PROCEDURE — 90686 IIV4 VACC NO PRSV 0.5 ML IM: CPT | Performed by: FAMILY MEDICINE

## 2020-10-16 RX ORDER — IBUPROFEN 600 MG/1
600 TABLET, FILM COATED ORAL
COMMUNITY
End: 2021-09-30

## 2020-10-16 ASSESSMENT — MIFFLIN-ST. JEOR: SCORE: 1298.64

## 2020-10-16 ASSESSMENT — ENCOUNTER SYMPTOMS
SORE THROAT: 0
NERVOUS/ANXIOUS: 1
BREAST MASS: 0
EYE PAIN: 0
SHORTNESS OF BREATH: 0
DIZZINESS: 0
ARTHRALGIAS: 0
PARESTHESIAS: 0
HEMATOCHEZIA: 0
CHILLS: 0
DIARRHEA: 0
HEARTBURN: 0
WEAKNESS: 0
JOINT SWELLING: 0
ABDOMINAL PAIN: 0
MYALGIAS: 1
DYSURIA: 0
HEMATURIA: 0
FREQUENCY: 0
COUGH: 0
FEVER: 0
CONSTIPATION: 0
PALPITATIONS: 0
NAUSEA: 0
HEADACHES: 0

## 2020-10-16 NOTE — PROGRESS NOTES
SUBJECTIVE:   CC: Allyn Mcqueen is an 39 year old woman who presents for preventive health visit.     Patient has been advised of split billing requirements and indicates understanding: Yes  Healthy Habits:     Getting at least 3 servings of Calcium per day:  Yes    Bi-annual eye exam:  NO    Dental care twice a year:  Yes    Sleep apnea or symptoms of sleep apnea:  None    Diet:  Regular (no restrictions)    Frequency of exercise:  4-5 days/week    Duration of exercise:  30-45 minutes    Taking medications regularly:  Yes    Barriers to taking medications:  None    Medication side effects:  None    PHQ-2 Total Score: 1    Additional concerns today:  Yes    Other Concern: Mole above the left ear.    Today's PHQ-2 Score:   PHQ-2 ( 1999 Pfizer) 10/16/2020   Q1: Little interest or pleasure in doing things 0   Q2: Feeling down, depressed or hopeless 1   PHQ-2 Score 1   Q1: Little interest or pleasure in doing things Not at all   Q2: Feeling down, depressed or hopeless Several days   PHQ-2 Score 1       Abuse: Current or Past (Physical, Sexual or Emotional) - No  Do you feel safe in your environment? Yes        Social History     Tobacco Use     Smoking status: Never Smoker     Smokeless tobacco: Never Used   Substance Use Topics     Alcohol use: Yes     Alcohol/week: 0.0 standard drinks     Comment: occ     If you drink alcohol do you typically have >3 drinks per day or >7 drinks per week? No    Alcohol Use 10/16/2020   Prescreen: >3 drinks/day or >7 drinks/week? No   Prescreen: >3 drinks/day or >7 drinks/week? -   No flowsheet data found.    Reviewed orders with patient.  Reviewed health maintenance and updated orders accordingly - Yes  BP Readings from Last 3 Encounters:   10/16/20 122/72   02/13/20 111/71   10/31/19 100/60    Wt Readings from Last 3 Encounters:   10/16/20 64.9 kg (143 lb)   02/13/20 64.1 kg (141 lb 4.8 oz)   10/31/19 63.5 kg (140 lb)                  Patient Active Problem List   Diagnosis      CARDIOVASCULAR SCREENING; LDL GOAL LESS THAN 160     Back pain     Past Surgical History:   Procedure Laterality Date     C SPINAL FUSION,ANT,EA ADNL LEVEL      fused cervical vertebrae ?c3-4       Social History     Tobacco Use     Smoking status: Never Smoker     Smokeless tobacco: Never Used   Substance Use Topics     Alcohol use: Yes     Alcohol/week: 0.0 standard drinks     Comment: occ     Family History   Problem Relation Age of Onset     Respiratory Mother         COPD     Psychotic Disorder Mother         Bi polar     Mental Illness Mother         diagnosed bipolar; depression     Lipids Father      Heart Disease Maternal Grandmother      Cerebrovascular Disease Maternal Grandmother      Cancer Paternal Aunt         breast 60s         Current Outpatient Medications   Medication Sig Dispense Refill     omeprazole (PRILOSEC) 20 MG DR capsule Take 1 capsule (20 mg) by mouth daily 30 capsule 3     ibuprofen (ADVIL/MOTRIN) 600 MG tablet Take 600 mg by mouth       Allergies   Allergen Reactions     Adhesive Tape Rash     Penicillins Rash     Rash on Augmentin     Recent Labs   Lab Test 13  1100   TSH 1.52        Mammogram not appropriate for this patient based on age.    Pertinent mammograms are reviewed under the imaging tab.  History of abnormal Pap smear: NO - age 30-65 PAP every 5 years with negative HPV co-testing recommended  PAP / HPV Latest Ref Rng & Units 2018   PAP - NIL NIL NIL   HPV 16 DNA NEG:Negative Negative Negative -   HPV 18 DNA NEG:Negative Negative Negative -   OTHER HR HPV NEG:Negative Negative Negative -     Reviewed and updated as needed this visit by clinical staff  Tobacco  Allergies  Meds  Problems  Med Hx  Surg Hx  Fam Hx  Soc Hx          Reviewed and updated as needed this visit by Provider     Problems            OB History    Para Term  AB Living   2 2 2 0 0 2   SAB TAB Ectopic Multiple Live Births   0 0 0 0 2      # Outcome Date  "GA Lbr [place]/2nd Weight Sex Delivery Anes PTL Lv   2 Term 02/18/18 39w6d  3.912 kg (8 lb 10 oz) M Vag-Spont  N MACIE      Name: Jean      Apgar1: 9  Apgar5: 9   1 Term 07/14/15 39w3d 09:10 / 02:00 3.719 kg (8 lb 3.2 oz) M Vag-Spont EPI  MACIE      Name: Manuel      Apgar1: 9  Apgar5: 9       Review of Systems   Constitutional: Negative for chills and fever.   HENT: Negative for congestion, ear pain, hearing loss and sore throat.    Eyes: Negative for pain and visual disturbance.   Respiratory: Negative for cough and shortness of breath.    Cardiovascular: Negative for chest pain, palpitations and peripheral edema.   Gastrointestinal: Negative for abdominal pain, constipation, diarrhea, heartburn, hematochezia and nausea.   Breasts:  Negative for tenderness, breast mass and discharge.   Genitourinary: Positive for vaginal discharge. Negative for dysuria, frequency, genital sores, hematuria, pelvic pain, urgency and vaginal bleeding.   Musculoskeletal: Positive for myalgias. Negative for arthralgias and joint swelling.   Skin: Negative for rash.   Neurological: Negative for dizziness, weakness, headaches and paresthesias.   Psychiatric/Behavioral: Negative for mood changes. The patient is nervous/anxious.      OBJECTIVE:   /72 (BP Location: Left arm, Patient Position: Sitting, Cuff Size: Adult Regular)   Pulse 82   Temp 97.9  F (36.6  C) (Tympanic)   Resp 16   Ht 1.626 m (5' 4\")   Wt 64.9 kg (143 lb)   SpO2 99%   BMI 24.55 kg/m    Physical Exam  GENERAL: healthy, alert and no distress  EYES: Eyes grossly normal to inspection, PERRL and conjunctivae and sclerae normal  HENT: ear canals and TM's normal, nose and mouth without ulcers or lesions  NECK: no adenopathy, no asymmetry, masses, or scars and thyroid normal to palpation  RESP: lungs clear to auscultation - no rales, rhonchi or wheezes  CV: regular rate and rhythm, normal S1 S2, no S3 or S4, no murmur, click or rub, no peripheral edema and peripheral pulses " "strong  ABDOMEN: soft, nontender, no hepatosplenomegaly, no masses and bowel sounds normal  MS: no gross musculoskeletal defects noted, no edema  SKIN: no suspicious lesions or rashes  NEURO: Normal strength and tone, mentation intact and speech normal  PSYCH: mentation appears normal, affect normal/bright    ASSESSMENT/PLAN:   1. Routine general medical examination at a health care facility  Routine, healthy woman    2. Need for prophylactic vaccination and inoculation against influenza  - INFLUENZA VACCINE IM > 6 MONTHS VALENT IIV4 [10454]  - Vaccine Administration, Initial [17037]    COUNSELING:  Reviewed preventive health counseling, as reflected in patient instructions    Estimated body mass index is 24.55 kg/m  as calculated from the following:    Height as of this encounter: 1.626 m (5' 4\").    Weight as of this encounter: 64.9 kg (143 lb).    She reports that she has never smoked. She has never used smokeless tobacco.      Counseling Resources:  ATP IV Guidelines  Pooled Cohorts Equation Calculator  Breast Cancer Risk Calculator  BRCA-Related Cancer Risk Assessment: FHS-7 Tool  FRAX Risk Assessment  ICSI Preventive Guidelines  Dietary Guidelines for Americans, 2010  USDA's MyPlate  ASA Prophylaxis  Lung CA Screening    Noelle Lundy MD  [facility]  "

## 2020-10-16 NOTE — PATIENT INSTRUCTIONS
Preventive Health Recommendations  Female Ages 40 to 49    Yearly exam:     See your health care provider every year in order to  1. Review health changes.   2. Discuss preventive care.    3. Review your medicines if your doctor prescribed any.      Get a Pap test  with HPV test every 5 years.        Have a colonoscopy (test for colon cancer) if someone in your family has had colon cancer or polyps before age 50.       Have a cholesterol test every 5 years.       Have a diabetes test (fasting glucose) after age 45. If you are at risk for diabetes, you should have this test every 3 years.    Shots: Get a flu shot each year. Get a tetanus shot every 10 years.     Nutrition:     Eat at least 5 servings of fruits and vegetables each day.    Eat whole-grain bread, whole-wheat pasta and brown rice instead of white grains and rice.    Get adequate Calcium and Vitamin D.      Lifestyle    Exercise at least 150 minutes a week (an average of 30 minutes a day, 5 days a week). This will help you control your weight and prevent disease.    Limit alcohol to one drink per day.    No smoking.     Wear sunscreen to prevent skin cancer.    See your dentist every six months for an exam and cleaning.

## 2020-11-12 ENCOUNTER — VIRTUAL VISIT (OUTPATIENT)
Dept: FAMILY MEDICINE | Facility: OTHER | Age: 40
End: 2020-11-12

## 2020-11-13 NOTE — PROGRESS NOTES
"Date: 2020 19:22:47  Clinician: Efra Oliva  Clinician NPI: [id number]  Patient: Allyn Mcqueen  Patient : 1980  Patient Address: [address] [place] [address] [address] [place] [address]  Patient Phone: [phone]  Visit Protocol: URI  Patient Summary:  Allyn is a 40 year old ( : 1980 ) female who initiated a [facility] Visit for COVID-19 (Coronavirus) evaluation and screening. When asked the question \"Please sign me up to receive news, health information and promotions. \", Allyn responded \"No\".    Allyn states her symptoms started 1-2 days ago.   Her symptoms consist of facial pain or pressure, malaise, a sore throat, a headache, enlarged lymph nodes, and nasal congestion.   Symptom details     Nasal secretions: The color of her mucus is clear.    Sore throat: Allyn reports having mild throat pain (1-3 on a 10 point pain scale), does not have exudate on her tonsils, and can swallow liquids. The lymph nodes in her neck are enlarged. A rash has not appeared on the skin since the sore throat started.     Facial pain or pressure: The facial pain or pressure feels worse when bending over or leaning forward.     Headache: She states the headache is mild (1-3 on a 10 point pain scale).      Allyn denies having vomiting, rhinitis, myalgias, chills, teeth pain, ageusia, diarrhea, ear pain, wheezing, fever, cough, nausea, and anosmia. She also denies taking antibiotic medication in the past month and having recent facial or sinus surgery in the past 60 days. She is not experiencing dyspnea.   Precipitating events  Allyn is not sure if she has been exposed to someone with strep throat.   Pertinent COVID-19 (Coronavirus) information  Allyn does not work or volunteer as healthcare worker or a . In the past 14 days, Allyn has not worked or volunteered at a healthcare facility or group living setting.   In the past 14 days, she also has not lived in a congregate living setting.   Allyn has " not had a close contact with a laboratory-confirmed COVID-19 patient within 14 days of symptom onset.    Since December 2019, Allyn has not been tested for COVID-19 and has had upper respiratory infection (URI) or influenza-like illness.      Date(s) of previous URI or influenza-like illness (free-text): March 9-14 (roughly)     Symptoms Allyn experienced during previous URI or influenza-like illness as reported by the patient (free-text): nasal congestion, sneezing, runny nose, fatigue, aches, mild fever        Pertinent medical history  Allyn does not get yeast infections when she takes antibiotics.   Allyn does not need a return to work/school note.   Weight: 140 lbs   Allyn does not smoke or use smokeless tobacco.   She denies pregnancy and is breastfeeding. Her last period was over a month ago.   Additional information as reported by the patient (free text): My main symptom is sinus pressure and post-nasal drip. Not much runny nose. Occasional sneezing. Swollen glands. Sore throat I think from post-nasal drip. I have had similar symptoms throughout the year and have taken allergy medicine on and off. But I've never had allergies when it is snowing before?!   Weight: 140 lbs    MEDICATIONS: Prilosec oral, ALLERGIES: NKDA  Clinician Response:  Dear Allyn,   Your symptoms show that you may have coronavirus (COVID-19). This illness can cause fever, cough and trouble breathing. Many people get a mild case and get better on their own. Some people can get very sick.  What should I do?  We would like to test you for this virus.   1. Please call [phone] to schedule your visit. Explain that you were referred by [facility] to have a COVID-19 test. Be [facility].  * If you need to schedule in [facility] please call [phone] or for [employer] employees please call [phone].  * If you need to schedule in the [place] area please call [phone]. [place] employees call  "[phone].  The following will serve as your written order for this COVID Test, ordered by me, for the indication of suspected COVID [Z20.828]: The test will be ordered in [employer], our electronic health record, after you are scheduled. It will show as ordered and authorized by Tejas Michele MD.  Order: COVID-19 (Coronavirus) PCR for SYMPTOMATIC testing from [facility].   2. When it's time for your COVID test:  Stay at least 6 feet away from others. (If someone will drive you to your test, stay in the backseat, as far away from the  as you can.)   Cover your mouth and nose with a mask, tissue or washcloth.  Go straight to the testing site. Don't make any stops on the way there or back.      3.Starting now: Stay home and away from others (self-isolate) until:   You've had no fever---and no medicine that reduces fever---for one full day (24 hours). And...   Your other symptoms have gotten better. For example, your cough or breathing has improved. And...   At least 10 days have passed since your symptoms started.       During this time, don't leave the house except for testing or medical care.   Stay in your own room, even for meals. Use your own bathroom if you can.   Stay away from others in your home. No hugging, kissing or shaking hands. No visitors.  Don't go to work, school or anywhere else.    Clean \"high touch\" surfaces often (doorknobs, counters, handles, etc.). Use a household cleaning spray or wipes. You'll find a full list of  on the EPA website: www.epa.gov/pesticide-registration/list-n-disinfectants-use-against-sars-cov-2.   Cover your mouth and nose with a mask, tissue or washcloth to avoid spreading germs.  Wash your hands and face often. Use soap and water.  Caregivers in these groups are at risk for severe illness due to COVID-19:  o People 65 years and older  o People who live in a nursing home or long-term care facility  o People with chronic disease (lung, heart, cancer, diabetes, kidney, " liver, immunologic)  o People who have a weakened immune system, including those who:   Are in cancer treatment  Take medicine that weakens the immune system, such as corticosteroids  Had a bone marrow or organ transplant  Have an immune deficiency  Have poorly controlled HIV or AIDS  Are obese (body mass index of 40 or higher)  Smoke regularly   o Caregivers should wear gloves while washing dishes, handling laundry and cleaning bedrooms and bathrooms.  o Use caution when washing and drying laundry: Don't shake dirty laundry, and use the warmest water setting that you can.  o For more tips, go to www.cdc.gov/coronavirus/2019-ncov/downloads/10Things.pdf.    4.Sign up for ROVOP. We know it's scary to hear that you might have COVID-19. We want to track your symptoms to make sure you're okay over the next 2 weeks. Please look for an email from ROVOP---this is a free, online program that we'll use to keep in touch. To sign up, follow the link in the email. Learn more at [email or web address]  How can I take care of myself?   Get lots of rest. Drink extra fluids (unless a doctor has told you not to).   Take Tylenol (acetaminophen) for fever or pain. If you have liver or kidney problems, ask your family doctor if it's okay to take Tylenol.   Adults can take either:    650 mg (two 325 mg pills) every 4 to 6 hours, or...   1,000 mg (two 500 mg pills) every 8 hours as needed.    Note: Don't take more than 3,000 mg in one day. Acetaminophen is found in many medicines (both prescribed and over-the-counter medicines). Read all labels to be sure you don't take too much.   For children, check the Tylenol bottle for the right dose. The dose is based on the child's age or weight.    If you have other health problems (like cancer, heart failure, an organ transplant or severe kidney disease): Call your specialty clinic if you don't feel better in the next 2 days.       Know when to call 911. Emergency warning  signs include:    Trouble breathing or shortness of breath Pain or pressure in the chest that doesn't go away Feeling confused like you haven't felt before, or not being able to wake up Bluish-colored lips or face.  Where can I get more information?   [facility] -- About COVID-19: [email or web address]   CDC -- What to Do If You're Sick: www.cdc.gov/coronavirus/2019-ncov/about/steps-when-sick.html   CDC -- Ending Home Isolation: www.cdc.gov/coronavirus/2019-ncov/hcp/disposition-in-home-patients.html   [facility] -- Caring for Someone: www.cdc.gov/coronavirus/2019-ncov/if-you-are-sick/care-for-someone.html   [facility] -- Interim Guidance for Hospital Discharge to Home: www.health.[facility].[place]./diseases/coronavirus/hcp/hospdischarge.pdf   [facility] clinical trials (COVID-19 research studies): clinicalaffairs.[facility]-clinical-trials    Below are the COVID-19 hotlines at the [place] Department of Health ([facility]). Interpreters are available.    For health questions: Call 651-857-0768 or 1-906.932.7344 (7 a.m. to 7 p.m.) For questions about schools and childcare: Call 175-290-7260 or 1-504.969.3284 (7 a.m. to 7 p.m.)    Diagnosis: Contact with and (suspected) exposure to other viral communicable diseases  Diagnosis ICD: Z20.828  Additional Clinician Notes:   If your symptoms are not improving or worsen, please go to one of our urgent care locations for evaluation and possible lab work.

## 2020-11-15 ENCOUNTER — AMBULATORY - HEALTHEAST (OUTPATIENT)
Dept: FAMILY MEDICINE | Facility: CLINIC | Age: 40
End: 2020-11-15

## 2020-11-15 DIAGNOSIS — Z20.822 SUSPECTED COVID-19 VIRUS INFECTION: ICD-10-CM

## 2021-06-28 NOTE — MR AVS SNAPSHOT
After Visit Summary   9/28/2017    Allyn Mcqueen    MRN: [id number]           Patient Information     Date Of Birth          1980        Visit Information        Provider Department      9/28/2017 11:30 AM Irving Morrell MD [facility] Maternal Fetal Medicine [facility]        Today's Diagnoses     AMA (advanced maternal age) multigravida 35+, second trimester    -  1       Follow-ups after your visit        Your next 10 appointments already scheduled     Sep 29, 2017 10:00 AM CDT   [facility] with Mireille Figueroa MD   [facility] ([facility])    [address] [place] [id number] [phone]              Who to contact     If you have questions or need follow up information about today's clinic visit or your schedule please contact [facility] FETAL [facility] directly at [phone].  Normal or non-critical lab and imaging results will be communicated to you by [email or web address], letter or phone within 4 business days after the clinic has received the results. If you do not hear from us within 7 days, please contact the clinic through [email or web address] or phone. If you have a critical or abnormal lab result, we will notify you by phone as soon as possible.  Submit refill requests through [email or web address] or call your pharmacy and they will forward the refill request to us. Please allow 3 business days for your refill to be completed.          Additional Information About Your Visit        [email or web address] Information     [email or web address] gives you secure access to your electronic health record. If you see a primary care provider, you can also send messages to your care team and make appointments. If you have questions, please call your primary care clinic.  If you do not have a primary care provider, please call [phone] and they will assist you.        Care EveryWhere ID     This is your Care EveryWhere ID. This could be used by  other organizations to access your [place] medical records  [id number]        Your Vitals Were     Last Period                   05/15/2017            Blood Pressure from Last 3 Encounters:   09/18/17 121/63   08/28/17 110/73   07/26/17 102/68    Weight from Last 3 Encounters:   09/18/17 67 kg (147 lb 11.2 oz)   08/28/17 65.8 kg (145 lb)   07/26/17 63.2 kg (139 lb 6.4 oz)              Today, you had the following     No orders found for display       Primary Care Provider Office Phone # Fax #    Noelle Lundy [phone] [address]  [facility] [address]        Equal Access to Services     JALIL PERKINS : Hadii shwetha barnetto Sokristina, waaxda luqadaha, qaybta kaalmada adeegyada, yuliya cruz . So [facility] [phone].    ATENCIÓN: Si habla español, tiene a ferrer disposición servicios gratuitos de asistencia lingüística. Llame al [phone].    We comply with applicable federal civil rights laws and [place] laws. We do not discriminate on the basis of race, color, national origin, age, disability sex, sexual orientation or gender identity.            Thank you!     Thank you for choosing [facility]  for your care. Our goal is always to provide you with excellent care. Hearing back from our patients is one way we can continue to improve our services. Please take a few minutes to complete the written survey that you may receive in the mail after your visit with us. Thank you!             Your Updated Medication List - Protect others around you: Learn how to safely use, store and throw away your medicines at www.disposemymeds.org.          This list is accurate as of: 9/28/17 12:11 PM.  Always use your most recent med list.                   Brand Name Dispense Instructions for use Diagnosis    prenatal multivitamin plus iron 27-0.8 MG Tabs per tablet      Take 1 tablet by mouth daily Reported on 3/9/2017           none noted

## 2021-08-07 ENCOUNTER — OFFICE VISIT (OUTPATIENT)
Dept: URGENT CARE | Facility: URGENT CARE | Age: 41
End: 2021-08-07
Payer: COMMERCIAL

## 2021-08-07 ENCOUNTER — E-VISIT (OUTPATIENT)
Dept: URGENT CARE | Facility: URGENT CARE | Age: 41
End: 2021-08-07
Payer: COMMERCIAL

## 2021-08-07 VITALS
DIASTOLIC BLOOD PRESSURE: 77 MMHG | WEIGHT: 140 LBS | OXYGEN SATURATION: 99 % | HEART RATE: 64 BPM | BODY MASS INDEX: 24.03 KG/M2 | SYSTOLIC BLOOD PRESSURE: 114 MMHG | TEMPERATURE: 98.1 F

## 2021-08-07 DIAGNOSIS — N89.8 VAGINAL DISCHARGE: ICD-10-CM

## 2021-08-07 DIAGNOSIS — R30.0 DYSURIA: Primary | ICD-10-CM

## 2021-08-07 DIAGNOSIS — R10.84 ABDOMINAL PAIN, GENERALIZED: Primary | ICD-10-CM

## 2021-08-07 LAB
ALBUMIN UR-MCNC: NEGATIVE MG/DL
APPEARANCE UR: CLEAR
BILIRUB UR QL STRIP: NEGATIVE
CLUE CELLS: ABNORMAL
COLOR UR AUTO: YELLOW
GLUCOSE UR STRIP-MCNC: NEGATIVE MG/DL
HGB UR QL STRIP: NEGATIVE
KETONES UR STRIP-MCNC: NEGATIVE MG/DL
LEUKOCYTE ESTERASE UR QL STRIP: NEGATIVE
NITRATE UR QL: NEGATIVE
PH UR STRIP: 6 [PH] (ref 5–7)
SP GR UR STRIP: 1.01 (ref 1–1.03)
TRICHOMONAS, WET PREP: ABNORMAL
UROBILINOGEN UR STRIP-ACNC: 0.2 E.U./DL
WBC'S/HIGH POWER FIELD, WET PREP: ABNORMAL
YEAST, WET PREP: ABNORMAL

## 2021-08-07 PROCEDURE — 81003 URINALYSIS AUTO W/O SCOPE: CPT

## 2021-08-07 PROCEDURE — 99421 OL DIG E/M SVC 5-10 MIN: CPT | Performed by: FAMILY MEDICINE

## 2021-08-07 PROCEDURE — 87210 SMEAR WET MOUNT SALINE/INK: CPT

## 2021-08-07 PROCEDURE — 99213 OFFICE O/P EST LOW 20 MIN: CPT | Performed by: FAMILY MEDICINE

## 2021-08-07 NOTE — PATIENT INSTRUCTIONS
Dear Allyn Mcqueen,    We are sorry you are not feeling well. Based on the responses you provided, it is recommended that you be seen in-person in urgent care so we can better evaluate your symptoms. Please click here to find the nearest urgent care location to you.   Thank you for trusting us with your care.    Eduard Stiles, DO

## 2021-08-07 NOTE — PROGRESS NOTES
Assessment & Plan     Dysuria  Minimal symptoms at this time, normal UA and wet prep. Recommended good oral hydration and ongoing monitoring of symptoms f/u as needed   - UA Macro with Reflex to Micro and Culture - lab collect  - UA Macro with Reflex to Micro and Culture - lab collect    Vaginal discharge  - Wet prep - lab collect  - Wet prep - lab collect       Richard Berry MD   [facility]    Kailyn Gruber is a 40 year old female who presents to clinic today for the following health issues:  Chief Complaint   Patient presents with     Urgent Care     UTI          HPI    Symptoms consist of: feeling the need to urinate/urgency. No blood in urine . She denies any urinary pain or irritation like in the past. Had a light period cramp type of discomfort that was brief. Had brief period of constipation.     Last UTI/bladder infection many years ago    no flank discomfort, new fever,vomiting. Slight nausea but has hx of digestive issues.   Denies history of pyelonephritis or kidney stones.     No abnormal discharge of the vaginal area.no itching.        Patient Active Problem List    Diagnosis Date Noted     Back pain 12/19/2017     Priority: Medium     CARDIOVASCULAR SCREENING; LDL GOAL LESS THAN 160 10/31/2010     Priority: Medium       Current Outpatient Medications   Medication     ibuprofen (ADVIL/MOTRIN) 600 MG tablet     omeprazole (PRILOSEC) 20 MG DR capsule     No current facility-administered medications for this visit.         Objective    /77   Pulse 64   Temp 98.1  F (36.7  C) (Tympanic)   Wt 63.5 kg (140 lb)   SpO2 99%   BMI 24.03 kg/m    Physical Exam     : deferred    Results for orders placed or performed in visit on 08/07/21   UA Macro with Reflex to Micro and Culture - lab collect     Status: Normal    Specimen: Urine, Midstream   Result Value Ref Range    Color Urine Yellow Colorless, Straw, Light Yellow, Yellow    Appearance Urine Clear Clear    Glucose Urine  Negative Negative mg/dL    Bilirubin Urine Negative Negative    Ketones Urine Negative Negative mg/dL    Specific Gravity Urine 1.010 1.003 - 1.035    Blood Urine Negative Negative    pH Urine 6.0 5.0 - 7.0    Protein Albumin Urine Negative Negative mg/dL    Urobilinogen Urine 0.2 0.2, 1.0 E.U./dL    Nitrite Urine Negative Negative    Leukocyte Esterase Urine Negative Negative    Narrative    Microscopic not indicated   Wet prep - lab collect     Status: Abnormal    Specimen: Vagina; Swab   Result Value Ref Range    Trichomonas Absent Absent    Yeast Absent Absent    Clue Cells Absent Absent    WBCs/high power field 1+ (A) None                     The use of Dragon/[employer] services may have been used to construct the content in this note; any grammatical or spelling errors are non-intentional. Please contact the author of this note directly if you are in need of any clarification.

## 2021-08-07 NOTE — PATIENT INSTRUCTIONS
If your urinary symptoms progress/worsen please return to be evaluated      Goal is 60 ounces of fluids a day (more if you are sweating/exercising)       If new symptoms develop please return to be evaluated

## 2021-09-30 ENCOUNTER — OFFICE VISIT (OUTPATIENT)
Dept: OBGYN | Facility: CLINIC | Age: 41
End: 2021-09-30
Payer: COMMERCIAL

## 2021-09-30 VITALS — SYSTOLIC BLOOD PRESSURE: 112 MMHG | BODY MASS INDEX: 24.85 KG/M2 | WEIGHT: 144.8 LBS | DIASTOLIC BLOOD PRESSURE: 72 MMHG

## 2021-09-30 DIAGNOSIS — N89.8 VAGINAL DISCHARGE: Primary | ICD-10-CM

## 2021-09-30 DIAGNOSIS — R10.2 PELVIC PAIN IN FEMALE: ICD-10-CM

## 2021-09-30 LAB
CLUE CELLS: NORMAL
TRICHOMONAS, WET PREP: NORMAL
WBC'S/HIGH POWER FIELD, WET PREP: NORMAL
YEAST, WET PREP: NORMAL

## 2021-09-30 PROCEDURE — 87210 SMEAR WET MOUNT SALINE/INK: CPT | Performed by: OBSTETRICS & GYNECOLOGY

## 2021-09-30 PROCEDURE — 87491 CHLMYD TRACH DNA AMP PROBE: CPT | Performed by: OBSTETRICS & GYNECOLOGY

## 2021-09-30 PROCEDURE — 87591 N.GONORRHOEAE DNA AMP PROB: CPT | Performed by: OBSTETRICS & GYNECOLOGY

## 2021-09-30 PROCEDURE — 99203 OFFICE O/P NEW LOW 30 MIN: CPT | Performed by: OBSTETRICS & GYNECOLOGY

## 2021-09-30 NOTE — PROGRESS NOTES
S; Allyn Mcqueen is a 40 year old  who presents with complaints of intermittent increased discharge for the past 2 months, approximately.  She has a mirena IUD for about 3 years and was not having periods with it.  In spring, she receiveded her J&J vaccine and did have a heavy period following that and then had a few lighter periods.  A month or so ago she had an episode of heavy cramping followed by a large amount of clear/watery/mucous discharge.  No odor or itching/irritation.  Did not smell like urine.  The next day a light period started.  This happened several times bout 2 weeks apart.  Most recently she noticed it last week but no bleeding began after.  She has no other symptoms, not sure if this is worrisome or not.    O; /72   Wt 65.7 kg (144 lb 12.8 oz)   BMI 24.85 kg/m      Psych: normal affect, appropriate eye contact  Resp: no increased work of breathing  CV: no peripheral edema  Abd; SNT, no palpable masses  Lymph: no enlarged inquinal nodes  Pelvic: normal vulva and vagina.  Cervix without lesions or CMT.  Mod amount of physiologic white/clear discharge in vault. IUD strings visible.  Skin: no visible rashes or lesions.    Wet prep: negative    A/p; pelvic cramping and increased vaginal discharge   No obvious evidence of infection, but gc/ct sent as well.  Discussed possibility of cramping causing loss of increased cervical mucous secondary to mirena IUD.  The periods may have been after effect of vaccine, especially since bleeding seems improved, but will plan pelvic us to check IUD placement as well as ovaries given the change in cramping and pain.  Questions answered    FRANK QUAN MD

## 2021-10-01 LAB
C TRACH DNA SPEC QL NAA+PROBE: NEGATIVE
N GONORRHOEA DNA SPEC QL NAA+PROBE: NEGATIVE

## 2021-10-02 ENCOUNTER — HEALTH MAINTENANCE LETTER (OUTPATIENT)
Age: 41
End: 2021-10-02

## 2021-10-04 ENCOUNTER — ANCILLARY PROCEDURE (OUTPATIENT)
Dept: MAMMOGRAPHY | Facility: CLINIC | Age: 41
End: 2021-10-04
Attending: FAMILY MEDICINE
Payer: COMMERCIAL

## 2021-10-04 DIAGNOSIS — Z12.31 VISIT FOR SCREENING MAMMOGRAM: ICD-10-CM

## 2021-10-04 PROCEDURE — 77063 BREAST TOMOSYNTHESIS BI: CPT | Mod: GC | Performed by: RADIOLOGY

## 2021-10-04 PROCEDURE — 77067 SCR MAMMO BI INCL CAD: CPT | Mod: GC | Performed by: RADIOLOGY

## 2021-11-18 ENCOUNTER — ANCILLARY PROCEDURE (OUTPATIENT)
Dept: ULTRASOUND IMAGING | Facility: CLINIC | Age: 41
End: 2021-11-18
Attending: OBSTETRICS & GYNECOLOGY
Payer: COMMERCIAL

## 2021-11-18 ENCOUNTER — OFFICE VISIT (OUTPATIENT)
Dept: OBGYN | Facility: CLINIC | Age: 41
End: 2021-11-18
Attending: OBSTETRICS & GYNECOLOGY
Payer: COMMERCIAL

## 2021-11-18 VITALS
DIASTOLIC BLOOD PRESSURE: 69 MMHG | WEIGHT: 146.22 LBS | BODY MASS INDEX: 25.1 KG/M2 | HEART RATE: 78 BPM | SYSTOLIC BLOOD PRESSURE: 122 MMHG

## 2021-11-18 DIAGNOSIS — R10.2 PELVIC PAIN IN FEMALE: ICD-10-CM

## 2021-11-18 DIAGNOSIS — N83.201 RIGHT OVARIAN CYST: Primary | ICD-10-CM

## 2021-11-18 DIAGNOSIS — N89.8 VAGINAL DISCHARGE: ICD-10-CM

## 2021-11-18 PROCEDURE — 76830 TRANSVAGINAL US NON-OB: CPT | Performed by: OBSTETRICS & GYNECOLOGY

## 2021-11-18 PROCEDURE — 99213 OFFICE O/P EST LOW 20 MIN: CPT | Performed by: OBSTETRICS & GYNECOLOGY

## 2021-11-18 NOTE — PROGRESS NOTES
[facility]- OBGYN    CC: pelvic pain and IUD check    S:Allyn Mcqueen is a 41 year old  who presents today for ultrasound follow up due to pelvic pain and IUD check.     Patient seen 21 by Dr. Figueroa for discharge- Mirena IUD placed 2018.  She reported mid cycle mucous discharge and a period.  Prior to that she didn't usually have a period regularly.  Wet prep, GC/CT were negative.  Strings were visualized on exam.      Today she states she feels fine.  No pain or concerns.  No bleeding since her previous appointment.  Patient reports she does have a history of ovarian cysts.    OBGYN Hx:   OB History    Para Term  AB Living   2 2 2 0 0 2   SAB IAB Ectopic Multiple Live Births   0 0 0 0 2      # Outcome Date GA Lbr Hill/2nd Weight Sex Delivery Anes PTL Lv   2 Term 18 39w6d  3.912 kg (8 lb 10 oz) M Vag-Spont  N MACIE      Name: Jean      Apgar1: 9  Apgar5: 9   1 Term 07/14/15 39w3d 09:10 / 02:00 3.719 kg (8 lb 3.2 oz) M Vag-Spont EPI  MACIE      Name: Manuel      Apgar1: 9  Apgar5: 9       LMP- 1.5 months ago  STD Hx:none  Pap hx: 18- NIL HPV negative    Past Medical History:   Diagnosis Date     Anxiety     worse post-partum     Esophageal reflux 2018    started during pregnancy; got worse year later     Ovarian cyst rupture 2010     Palpitations     off and on for years; more off now     Stomach problems 2018    started post-pregnancy in 2018     O:   Patient Vitals for the past 24 hrs:   BP Pulse Weight   21 0814 122/69 78 66.3 kg (146 lb 3.5 oz)   ]  Exam:  General- awake, alert, answering questions appropriately, appears comfortable  CV- regular rate and rhythm  Lung- breathing comfortably on room air    Imaging and Labs:  Gynecological Ultrasound Report  Pelvic U/S - Transvaginal   [facility]  Referring Provider: Dr. Mireille Figueroa  Sonographer:  Sulma Maradiaga RDMS  Indication: pelvic pain  LMP: No LMP  recorded. (Menstrual status: IUD).     Gynecological Ultrasonography:   Uterus: anteverted. Contour is smooth/regular.  Size: 9.6 x 4.9 x 4.0 cm  Endometrium: Thickness Total 5.5 mm  Findings: IUD in cavity, appears appropriately positioned     Right Ovary: 6.4 x 4.3 x 3.9 cm. Simple cyst 3.8 x 3.9 x 3.5 cm, Simple cyst 3.9 x 2.9 x 3.5 cm, Simple cyst 2.2 x 2.3 x 1.8 cm   Left Ovary: 2.5 x 2.2 x 1.5 cm. Wnl  Cul de Sac Free Fluid: Trace     Impression:   The uterus was visualized and no abnormalities were seen.  An IUD is located within the uterine cavity, appears appropriately positioned.  Multiple simple cysts seen in right ovary.  Left ovary within normal limits.     Consider repeat ultrasound in 2-3 cycles to re-evaluate right ovary.     Katina Hernandez MD    A&P: Allyn Mcqueen is a 41 year old  who presents today for ultrasound follow up due to pelvic pain and IUD check.     (N83.201) Right ovarian cyst  (primary encounter diagnosis)  Comment: Reviewed ultrasound with patient.  IUD appears to be in correct position.  Cysts on right ovary.  Reviewed cyst development and ovulation with Mirena IUD in place.  This correlates with her change in vaginal discharge quality and intermittent mid cycle pain.  Patient is happy with Mirena IUD use and would like to continue this method.  Discussed options for ovulatory suppression such as birth control pills vs. Depo provera.  Patient declines this option.    Plan: US Transvaginal Non OB        Plan for repeat ultrasound to follow up cysts in 2-3 months.     Nubia Nelson MD

## 2021-11-18 NOTE — PATIENT INSTRUCTIONS
Patient Education     Transvaginal Ultrasound (Endovaginal Ultrasound)   A transvaginal ultrasound is an imaging test. An ultrasound uses sound waves to form pictures of your organs that can be seen on a screen. It's often done with a probe placed on the belly. A transvaginal ultrasound uses a special probe that is put into your vagina. It uses sound waves to make pictures of your uterus, ovaries, and other pelvic organs. This test can be used to check symptoms such as pain. It can also check for problems. In pregnant women, it's used to check the unborn baby or fetus. The test is often done by a specially trained technologist called a sonographer.   Getting ready for your test    You may be asked to go to the bathroom. Your bladder may need to be empty before the test.    Tell the sonographer what medicines you take. Let him or her know if you have had pelvic surgery.    Answer any other questions the sonographer asks. Your answers will help him or her adapt the test to your health needs.    During your test    You may change into a hospital gown. You'll then lie down on an exam table with your knees raised, as you would for a pelvic exam.    The sonographer will use a thin hand-held probe. It's shaped like a tampon. The probe has a sterile cover and nongreasy gel. It's gently put inside your vagina. In some cases, you may be asked to put the probe in yourself, as you would a tampon.    The sonographer moves the probe to get the best picture. You may feel pressure. Tell the sonographer if you feel pain.    After your test  Before leaving, you may need to wait for a short time while the images are reviewed. You can go back to your normal routine right after the test. Your healthcare provider will let you know when the results of your test are ready.   Be aware that although the sonographer can answer questions about the test, only your healthcare provider can explain the results.   Jorge last reviewed this  educational content on 7/1/2020 2000-2021 [employer]. All rights reserved. This information is not intended as a substitute for professional medical care. Always follow your healthcare professional's instructions.

## 2021-11-27 ENCOUNTER — HEALTH MAINTENANCE LETTER (OUTPATIENT)
Age: 41
End: 2021-11-27

## 2021-12-06 NOTE — NURSING NOTE
"Chief Complaint   Patient presents with     Derm Problem       Initial /72  Pulse 63  Wt 141 lb (64 kg)  SpO2 98%  BMI 24.2 kg/m2 Estimated body mass index is 24.2 kg/(m^2) as calculated from the following:    Height as of 3/9/17: 5' 4\" (1.626 m).    Weight as of this encounter: 141 lb (64 kg).  Medication Reconciliation: complete       Casa Paulino MA       " [FreeTextEntry1] : The patient is a 57-year-old female who enjoys good health.  The patient had a colonoscopy done 10 years ago and is considered low risk.  The patient is due for repeat exam and she will be scheduled at her earliest convenience.  The risk benefits were thoroughly described and all questions were answered.  Once performed I will distribute a copy of the results.

## 2022-01-20 ASSESSMENT — ENCOUNTER SYMPTOMS
EYE PAIN: 0
HEADACHES: 0
FEVER: 0
HEARTBURN: 1
HEMATURIA: 0
BREAST MASS: 1
MYALGIAS: 0
WEAKNESS: 0
NERVOUS/ANXIOUS: 1
COUGH: 0
PARESTHESIAS: 0
NAUSEA: 0
DYSURIA: 0
PALPITATIONS: 0
CHILLS: 0
ABDOMINAL PAIN: 0
ARTHRALGIAS: 0
JOINT SWELLING: 0
SHORTNESS OF BREATH: 0
FREQUENCY: 0
CONSTIPATION: 0
HEMATOCHEZIA: 0
DIARRHEA: 0
SORE THROAT: 0
DIZZINESS: 0

## 2022-01-21 ENCOUNTER — OFFICE VISIT (OUTPATIENT)
Dept: FAMILY MEDICINE | Facility: CLINIC | Age: 42
End: 2022-01-21
Payer: COMMERCIAL

## 2022-01-21 VITALS
HEIGHT: 65 IN | HEART RATE: 53 BPM | OXYGEN SATURATION: 99 % | RESPIRATION RATE: 15 BRPM | DIASTOLIC BLOOD PRESSURE: 80 MMHG | WEIGHT: 145 LBS | SYSTOLIC BLOOD PRESSURE: 122 MMHG | TEMPERATURE: 97.3 F | BODY MASS INDEX: 24.16 KG/M2

## 2022-01-21 DIAGNOSIS — M41.119 JUVENILE IDIOPATHIC SCOLIOSIS, UNSPECIFIED SPINAL REGION: ICD-10-CM

## 2022-01-21 DIAGNOSIS — G89.29 CHRONIC MIDLINE LOW BACK PAIN WITHOUT SCIATICA: ICD-10-CM

## 2022-01-21 DIAGNOSIS — Z67.40 TYPE O BLOOD, RH POSITIVE: ICD-10-CM

## 2022-01-21 DIAGNOSIS — M54.50 CHRONIC MIDLINE LOW BACK PAIN WITHOUT SCIATICA: ICD-10-CM

## 2022-01-21 DIAGNOSIS — Z00.00 ROUTINE GENERAL MEDICAL EXAMINATION AT A HEALTH CARE FACILITY: Primary | ICD-10-CM

## 2022-01-21 PROCEDURE — 99396 PREV VISIT EST AGE 40-64: CPT | Performed by: FAMILY MEDICINE

## 2022-01-21 ASSESSMENT — ENCOUNTER SYMPTOMS
PALPITATIONS: 0
PARESTHESIAS: 0
NAUSEA: 0
FREQUENCY: 0
JOINT SWELLING: 0
CONSTIPATION: 0
WEAKNESS: 0
SHORTNESS OF BREATH: 0
ABDOMINAL PAIN: 0
DYSURIA: 0
COUGH: 0
FEVER: 0
NERVOUS/ANXIOUS: 1
EYE PAIN: 0
CHILLS: 0
DIZZINESS: 0
SORE THROAT: 0
BREAST MASS: 1
ARTHRALGIAS: 0
HEMATOCHEZIA: 0
DIARRHEA: 0
HEMATURIA: 0
HEADACHES: 0
MYALGIAS: 0
HEARTBURN: 1

## 2022-01-21 ASSESSMENT — MIFFLIN-ST. JEOR: SCORE: 1313.6

## 2022-01-21 NOTE — PROGRESS NOTES
SUBJECTIVE:   CC: Allyn Mcqueen is an 41 year old woman who presents for preventive health visit.     Patient has been advised of split billing requirements and indicates understanding: Yes  Healthy Habits:     Getting at least 3 servings of Calcium per day:  Yes    Bi-annual eye exam:  NO    Dental care twice a year:  Yes    Sleep apnea or symptoms of sleep apnea:  None    Diet:  Regular (no restrictions)    Frequency of exercise:  4-5 days/week    Duration of exercise:  30-45 minutes    Taking medications regularly:  Yes    Medication side effects:  None    PHQ-2 Total Score: 0    Additional concerns today:  Yes     general questions         Musculoskeletal problem/pain      Duration: chronic low back pain, aggravated by pregnancy almost 4 years ago, but hasn't gotten better,described as dull ache of lower back.    Known hx of mild scoliosis    Chiropractic treatment hasn't helped    Today's PHQ-2 Score:   PHQ-2 ( 1999 Pfizer) 1/20/2022   Q1: Little interest or pleasure in doing things 0   Q2: Feeling down, depressed or hopeless 0   PHQ-2 Score 0   PHQ-2 Total Score (12-17 Years)- Positive if 3 or more points; Administer PHQ-A if positive -   Q1: Little interest or pleasure in doing things Not at all   Q2: Feeling down, depressed or hopeless Not at all   PHQ-2 Score 0       Abuse: Current or Past (Physical, Sexual or Emotional) - No  Do you feel safe in your environment? Yes    Have you ever done Advance Care Planning? (For example, a Health Directive, POLST, or a discussion with a medical provider or your loved ones about your wishes): No, advance care planning information given to patient to review.  Patient plans to discuss their wishes with loved ones or provider.      Social History     Tobacco Use     Smoking status: Never Smoker     Smokeless tobacco: Never Used   Substance Use Topics     Alcohol use: Yes     Alcohol/week: 0.0 standard drinks     Comment: occ     If you drink alcohol do you typically  have >3 drinks per day or >7 drinks per week? No    Alcohol Use 1/20/2022   Prescreen: >3 drinks/day or >7 drinks/week? No   Prescreen: >3 drinks/day or >7 drinks/week? -       Reviewed orders with patient.  Reviewed health maintenance and updated orders accordingly - Yes  BP Readings from Last 3 Encounters:   01/21/22 122/80   11/18/21 122/69   09/30/21 112/72    Wt Readings from Last 3 Encounters:   01/21/22 65.8 kg (145 lb)   11/18/21 66.3 kg (146 lb 3.5 oz)   09/30/21 65.7 kg (144 lb 12.8 oz)                  Patient Active Problem List   Diagnosis     CARDIOVASCULAR SCREENING; LDL GOAL LESS THAN 160     Back pain     Type O blood, Rh positive     Juvenile idiopathic scoliosis, unspecified spinal region     Past Surgical History:   Procedure Laterality Date     ZZC SPINAL FUSION,ANT,EA ADNL LEVEL      fused cervical vertebrae ?c3-4       Social History     Tobacco Use     Smoking status: Never Smoker     Smokeless tobacco: Never Used   Substance Use Topics     Alcohol use: Yes     Alcohol/week: 0.0 standard drinks     Comment: occ     Family History   Problem Relation Age of Onset     Respiratory Mother         COPD     Psychotic Disorder Mother         Bi polar     Mental Illness Mother         diagnosed bipolar; depression     Lipids Father      Heart Disease Maternal Grandmother      Cerebrovascular Disease Maternal Grandmother      Cancer Paternal Aunt         breast 60s         Current Outpatient Medications   Medication Sig Dispense Refill     levonorgestrel (MIRENA) 20 MCG/24HR IUD 1 each by Intrauterine route once       omeprazole (PRILOSEC) 20 MG DR capsule Take 1 capsule (20 mg) by mouth daily 30 capsule 3     Allergies   Allergen Reactions     Adhesive Tape Rash     No lab results found.     Breast Cancer Screening:    Breast CA Risk Assessment (FHS-7) 1/20/2022   Do you have a family history of breast, colon, or ovarian cancer? No / Unknown         Mammogram Screening - Offered annual screening and  updated Health Maintenance for mutual plan based on risk factor consideration    Pertinent mammograms are reviewed under the imaging tab.    History of abnormal Pap smear: NO - age 30-65 PAP every 5 years with negative HPV co-testing recommended  PAP / HPV Latest Ref Rng & Units 2018   PAP (Historical) - NIL NIL NIL   HPV16 NEG:Negative Negative Negative -   HPV18 NEG:Negative Negative Negative -   HRHPV NEG:Negative Negative Negative -     Reviewed and updated as needed this visit by clinical staff  Tobacco  Allergies  Meds   Med Hx  Surg Hx  Fam Hx  Soc Hx       Reviewed and updated as needed this visit by Provider               OB History    Para Term  AB Living   2 2 2 0 0 2   SAB IAB Ectopic Multiple Live Births   0 0 0 0 2      # Outcome Date GA Lbr Hill/2nd Weight Sex Delivery Anes PTL Lv   2 Term 18 39w6d  3.912 kg (8 lb 10 oz) M Vag-Spont  N MACIE      Name: Jean      Apgar1: 9  Apgar5: 9   1 Term 07/14/15 39w3d 09:10 / 02:00 3.719 kg (8 lb 3.2 oz) M Vag-Spont EPI  MACIE      Name: Manuel      Apgar1: 9  Apgar5: 9       Review of Systems   Constitutional: Negative for chills and fever.   HENT: Negative for congestion, ear pain, hearing loss and sore throat.    Eyes: Negative for pain and visual disturbance.   Respiratory: Negative for cough and shortness of breath.    Cardiovascular: Negative for chest pain, palpitations and peripheral edema.   Gastrointestinal: Positive for heartburn. Negative for abdominal pain, constipation, diarrhea, hematochezia and nausea.   Breasts:  Positive for breast mass. Negative for tenderness and discharge.   Genitourinary: Negative for dysuria, frequency, genital sores, hematuria, pelvic pain, urgency, vaginal bleeding and vaginal discharge.   Musculoskeletal: Negative for arthralgias, joint swelling and myalgias.   Skin: Negative for rash.   Neurological: Negative for dizziness, weakness, headaches and paresthesias.  "  Psychiatric/Behavioral: Negative for mood changes. The patient is nervous/anxious.         OBJECTIVE:   /80 (BP Location: Left arm, Patient Position: Chair, Cuff Size: Adult Regular)   Pulse 53   Temp 97.3  F (36.3  C) (Temporal)   Resp 15   Ht 1.651 m (5' 5\")   Wt 65.8 kg (145 lb)   LMP 12/01/2021 (Approximate)   SpO2 99%   BMI 24.13 kg/m    Physical Exam  GENERAL: healthy, alert and no distress  EYES: Eyes grossly normal to inspection, PERRL and conjunctivae and sclerae normal  HENT: ear canals and TM's normal, nose and mouth without ulcers or lesions  NECK: no adenopathy, no asymmetry, masses, or scars and thyroid normal to palpation  RESP: lungs clear to auscultation - no rales, rhonchi or wheezes  CV: regular rate and rhythm, normal S1 S2, no S3 or S4, no murmur, click or rub, no peripheral edema and peripheral pulses strong  ABDOMEN: soft, nontender, no hepatosplenomegaly, no masses and bowel sounds normal  MS: no gross musculoskeletal defects noted, no edema  SKIN: no suspicious lesions or rashes  NEURO: Normal strength and tone, mentation intact and speech normal  PSYCH: mentation appears normal, affect normal/bright      ASSESSMENT/PLAN:   (Z00.00) Routine general medical examination at a health care facility  (primary encounter diagnosis)  routine    (Z67.40) Type O blood, Rh positive  Comment: noted on review of labs      (M54.50,  G89.29) Chronic midline low back pain without sciatica  Complicated/caused by  (M41.119) Juvenile idiopathic scoliosis, unspecified spinal region  Comment: referred to PT  Plan: [facility]          COUNSELING:  Reviewed preventive health counseling, as reflected in patient instructions    Estimated body mass index is 24.13 kg/m  as calculated from the following:    Height as of this encounter: 1.651 m (5' 5\").    Weight as of this encounter: 65.8 kg (145 lb).        She reports that she has never smoked. She has never used smokeless " tobacco.      Counseling Resources:  ATP IV Guidelines  Pooled Cohorts Equation Calculator  Breast Cancer Risk Calculator  BRCA-Related Cancer Risk Assessment: FHS-7 Tool  FRAX Risk Assessment  ICSI Preventive Guidelines  Dietary Guidelines for Americans, 2010  USDA's MyPlate  ASA Prophylaxis  Lung CA Screening    Noelle Lundy MD  [facility]

## 2022-01-21 NOTE — PATIENT INSTRUCTIONS
[facility]  Physical therapy to get you back in the game of life   [facility], a service of [facility], provides orthopedic and sports physical therapy at over 30 [facility] locations. In addtion to physical therapy, [facility] offers chiropractic and athletic training services.   Appointments [phone]      Preventive Health Recommendations  Female Ages 40 to 49    Yearly exam:     See your health care provider every year in order to  1. Review health changes.   2. Discuss preventive care.    3. Review your medicines if your doctor prescribed any.      Get a Pap test every three years (unless you have an abnormal result and your provider advises testing more often).      If you get Pap tests with HPV test, you only need to test every 5 years, unless you have an abnormal result. You do not need a Pap test if your uterus was removed (hysterectomy) and you have not had cancer.      You should be tested each year for STDs (sexually transmitted diseases), if you're at risk.     Ask your doctor if you should have a mammogram.      Have a colonoscopy (test for colon cancer) if someone in your family has had colon cancer or polyps before age 50.       Have a cholesterol test every 5 years.       Have a diabetes test (fasting glucose) after age 45. If you are at risk for diabetes, you should have this test every 3 years.    Shots: Get a flu shot each year. Get a tetanus shot every 10 years.     Nutrition:     Eat at least 5 servings of fruits and vegetables each day.    Eat whole-grain bread, whole-wheat pasta and brown rice instead of white grains and rice.    Get adequate Calcium and Vitamin D.      Lifestyle    Exercise at least 150 minutes a week (an average of 30 minutes a day, 5 days a week). This will help you control your weight and prevent disease.    Limit alcohol to one drink per day.    No smoking.     Wear sunscreen to prevent skin cancer.    See your  dentist every six months for an exam and cleaning.

## 2022-02-11 ENCOUNTER — THERAPY VISIT (OUTPATIENT)
Dept: PHYSICAL THERAPY | Facility: CLINIC | Age: 42
End: 2022-02-11
Payer: COMMERCIAL

## 2022-02-11 DIAGNOSIS — M54.50 CHRONIC MIDLINE LOW BACK PAIN WITHOUT SCIATICA: ICD-10-CM

## 2022-02-11 DIAGNOSIS — G89.29 CHRONIC MIDLINE LOW BACK PAIN WITHOUT SCIATICA: ICD-10-CM

## 2022-02-11 DIAGNOSIS — M41.119 JUVENILE IDIOPATHIC SCOLIOSIS, UNSPECIFIED SPINAL REGION: ICD-10-CM

## 2022-02-11 PROBLEM — M54.9 BACK PAIN: Status: ACTIVE | Noted: 2017-12-19

## 2022-02-11 PROCEDURE — 97161 PT EVAL LOW COMPLEX 20 MIN: CPT | Mod: GP

## 2022-02-11 PROCEDURE — 97110 THERAPEUTIC EXERCISES: CPT | Mod: GP

## 2022-02-11 NOTE — PROGRESS NOTES
Physical Therapy Initial Evaluation  Therapist Impression: Allyn is a 41 year old year old female referred to physical therapy by Dr. Noelle Lundy for treatment of chronic low back pain. Patient presents to physical therapy with c/o the above. Subjective history and objective findings are consistent with diagnosis. Due to these impairments, patient has difficulty with twisting, lifting and extended sitting. Patient will benefit from skilled PT to address impairments/limitations in order to reach patient's goals, facilitate return to prior level of function, and maximize participation.    KEY FINDINGS:  1. B hip ABD and extensor weakness  2. Hypermobility in hips, lumbar ROM WNL  3. No radicular sxs    Subjective:  The history is provided by the patient. No  was used.   Therapist Generated HPI Evaluation  Problem details: Pt presents with chronic low back pain that has been ongoing for 4+ years after having her second child. Then two months ago she slipped on fell onto her L hip, since then has been hurting more than usual. Pain is on and off. Has some tingling in L hand at night after sleeping in certain positions - no other numbness or tingling. For physical activity she runs 2-3 miles, 2-3x/week, 1x/week yoga, enjoys walking. Pain at worst up to 5-6/10..         Type of problem:  Lumbar.    This is a chronic condition.  Condition occurred with:  Insidious onset.  Where condition occurred: for unknown reasons.  Patient reports pain:  Lumbar spine left.  Pain is described as aching (occassional sharp pain) and is intermittent.  Radiates to: upper L back, nothing into LEs. Pain is worse in the A.M. (gets stiff over night).  Since onset symptoms are unchanged (but fluctuates).  Associated symptoms:  Loss of motion/stiffness. Symptoms are exacerbated by twisting and lifting (lying down for extended times, sitting for more than 1 hr)  and relieved by activity/movement, rest and ice  (stretching).  Imaging testing: no recent imaging.  Previous treatment includes chiropractic (on and off). There was moderate improvement following previous treatment.  Restrictions due to condition include:  Working in normal job without restrictions.  Barriers include:  None as reported by patient.    Patient Health History         Pain is reported as 3/10 on pain scale.  General health as reported by patient is excellent.  Pertinent medical history includes: anemia and numbness/tingling.   Red flags:  Pain at rest/night.  Medical allergies: latex.   Surgeries include:  Orthopedic surgery (cervical spinal fusion 15 yrs ago).    Current medications:  Other. Other medications details: birth control, omezparole (heartburn).    Current occupation is .   Primary job tasks include:  Computer work.                                    Objective:  Standing Alignment:        Lumbar deviations alignment: Scoliosis not visible with pt in upright sitting posture, can palpate slight curvature.  Pelvic:  Iliac crest high R (slight, in standing)                         Lumbar/SI Evaluation  ROM:  Arom wnl lumbar: * indicates pain.  AROM Lumbar:   Flexion:            Hands to floor  Ext:                    WNL, * L side   Side Bend:        Left:  WNL, * B     Right:  WNL, *L   Rotation:           Left:  WNL, * L    Right:  WNL  Side Glide:        Left:     Right:           Lumbar Myotomes:  normal (hip ABD 4/5 B, hip extension 5-/5 B, pain in L back with R hip extension testing)                Lumbar Dermtomes:  normal                Neural Tension/Mobility:  Lumbar:  Normal (good hamstring and quad mobility)        Lumbar Palpation:  Palpation (lumbar): TTP T10-12.      Functional Tests:  Core strength and proprioception lumbar: SL balance intact EO.        Lumbar Provocation:      Left negative with:  PROM hip    Right negative with:  PROM hip                                      Hip Evaluation  Hip PROM:  :  hypermobile hip rotation.                                           General     ROS    Assessment/Plan:    Patient is a 41 year old female with lumbar complaints.    Patient has the following significant findings with corresponding treatment plan.                Diagnosis 1:  Chronic LBP  Pain -  hot/cold therapy, manual therapy, self management, education, directional preference exercise and home program  Decreased strength - therapeutic exercise, therapeutic activities and home program  Decreased function - therapeutic activities and home program  Impaired posture - neuro re-education, therapeutic activities and home program    Therapy Evaluation Codes:   Cumulative Therapy Evaluation is: Low complexity.    Previous and current functional limitations:  (See Goal Flow Sheet for this information)    Short term and Long term goals: (See Goal Flow Sheet for this information)     Communication ability:  Patient appears to be able to clearly communicate and understand verbal and written communication and follow directions correctly.  Treatment Explanation - The following has been discussed with the patient:   RX ordered/plan of care  Anticipated outcomes  Possible risks and side effects  This patient would benefit from PT intervention to resume normal activities.   Rehab potential is excellent.    Frequency:  1 X week, once daily  Duration:  for 6 weeks  Discharge Plan:  Achieve all LTG.  Independent in home treatment program.  Reach maximal therapeutic benefit.    Please refer to the daily flowsheet for treatment today, total treatment time and time spent performing 1:1 timed codes.

## 2022-02-18 ENCOUNTER — ANCILLARY PROCEDURE (OUTPATIENT)
Dept: ULTRASOUND IMAGING | Facility: CLINIC | Age: 42
End: 2022-02-18
Attending: OBSTETRICS & GYNECOLOGY
Payer: COMMERCIAL

## 2022-02-18 ENCOUNTER — OFFICE VISIT (OUTPATIENT)
Dept: OBGYN | Facility: CLINIC | Age: 42
End: 2022-02-18
Attending: OBSTETRICS & GYNECOLOGY
Payer: COMMERCIAL

## 2022-02-18 VITALS
DIASTOLIC BLOOD PRESSURE: 69 MMHG | HEART RATE: 56 BPM | OXYGEN SATURATION: 97 % | SYSTOLIC BLOOD PRESSURE: 116 MMHG | WEIGHT: 145.8 LBS | BODY MASS INDEX: 24.26 KG/M2

## 2022-02-18 DIAGNOSIS — N83.201 RIGHT OVARIAN CYST: ICD-10-CM

## 2022-02-18 DIAGNOSIS — N83.201 RIGHT OVARIAN CYST: Primary | ICD-10-CM

## 2022-02-18 PROCEDURE — 99213 OFFICE O/P EST LOW 20 MIN: CPT | Performed by: OBSTETRICS & GYNECOLOGY

## 2022-02-18 PROCEDURE — 76830 TRANSVAGINAL US NON-OB: CPT | Performed by: OBSTETRICS & GYNECOLOGY

## 2022-02-18 NOTE — PROGRESS NOTES
"[facility]- OBGYN    CC: ultrasound review    S:Allyn Mcqueen is a 41 year old  who presents today for ultrasound review.  Patient reports she has had no pelvic pain or pelvic concerns since her visit on 21.  No vaginal bleeding.    18- Mirena IUD insertion  21 previous pelvic ultrasound done due to pelvic pain and check IUD position.  Right ovary with 3 simple cysts measuring 3.9cm, 3.9cm, and 2.3 cm seen      O: Patient Vitals for the past 24 hrs:   BP Pulse SpO2 Weight   22 0853 116/69 56 97 % 66.1 kg (145 lb 12.8 oz)   ]  Exam:  General- awake, alert, answering questions appropriately, appears comfortable  CV- regular rate  Lung-breathing comfortably on room air    Imaging and Labs:Gynecological Ultrasound Report  Pelvic U/S - Transvaginal   [facility]etric[facility]ne[facility]  Referring Provider: Nubia Nelson MD   Sonographer:  Sulma Maradiaga RDMS  Indication: ovarian cysts right  LMP: No LMP recorded. (Menstrual status: IUD).     Gynecological Ultrasonography:   Uterus: anteverted. Contour is smooth/regular.  Size: 9.7 x 5.9 x 3.9 cm  Endometrium: Thickness Total 2.6 mm  Findings: IUD in cavity     Right Ovary: 3.1 x 2.8 x 2.9 cm. Simple cyst 2.8 x 3.2 x 3.1 cm, Simple cyst 2.2 x 2.3 x 2.1 cm, Simple cyst 2.5 x 2.4 x 2.4 cm   Left Ovary: 2.5 x 1.8 x 1.5 cm. Wnl  Cul de Sac Free Fluid: Trace     Impression:   IUD is in appropriate location in \"uterus per Dr. Hernandez's addendum\".  Simple cysts in right ovary are still present, although slightly smaller in size.  Left ovary is within normal limits.       A&P: Allyn Mcqueen is a 41 year old  who presents today for ultrasound review.     (N83.201) Right ovarian cyst  (primary encounter diagnosis)  Comment: Ultrasound images reviewed with patient.  Compared today's ultrasound images with ultrasound images from November with patient. Right ovarian cysts appear simple, decreased in " size overall.  No pelvic pain symptoms.  Discussed repeat ultrasound in 6 months to ensure no increased size in cysts and patient instructed to follow up sooner if pelvic pain symptoms.   Plan: US Transvaginal Pelvic Non-OB    Nubia Nelson MD

## 2022-03-15 ENCOUNTER — E-VISIT (OUTPATIENT)
Dept: FAMILY MEDICINE | Facility: CLINIC | Age: 42
End: 2022-03-15
Payer: COMMERCIAL

## 2022-03-15 DIAGNOSIS — F41.1 GENERALIZED ANXIETY DISORDER: Primary | ICD-10-CM

## 2022-03-15 PROCEDURE — 99421 OL DIG E/M SVC 5-10 MIN: CPT | Performed by: FAMILY MEDICINE

## 2022-03-15 RX ORDER — CITALOPRAM HYDROBROMIDE 10 MG/1
10 TABLET ORAL DAILY
Qty: 30 TABLET | Refills: 1 | Status: SHIPPED | OUTPATIENT
Start: 2022-03-15 | End: 2022-04-20

## 2022-03-15 ASSESSMENT — ANXIETY QUESTIONNAIRES
7. FEELING AFRAID AS IF SOMETHING AWFUL MIGHT HAPPEN: SEVERAL DAYS
GAD7 TOTAL SCORE: 6
6. BECOMING EASILY ANNOYED OR IRRITABLE: NOT AT ALL
8. IF YOU CHECKED OFF ANY PROBLEMS, HOW DIFFICULT HAVE THESE MADE IT FOR YOU TO DO YOUR WORK, TAKE CARE OF THINGS AT HOME, OR GET ALONG WITH OTHER PEOPLE?: SOMEWHAT DIFFICULT
5. BEING SO RESTLESS THAT IT IS HARD TO SIT STILL: SEVERAL DAYS
4. TROUBLE RELAXING: SEVERAL DAYS
GAD7 TOTAL SCORE: 6
1. FEELING NERVOUS, ANXIOUS, OR ON EDGE: SEVERAL DAYS
3. WORRYING TOO MUCH ABOUT DIFFERENT THINGS: SEVERAL DAYS
7. FEELING AFRAID AS IF SOMETHING AWFUL MIGHT HAPPEN: SEVERAL DAYS
GAD7 TOTAL SCORE: 6
2. NOT BEING ABLE TO STOP OR CONTROL WORRYING: SEVERAL DAYS

## 2022-03-16 ASSESSMENT — ANXIETY QUESTIONNAIRES: GAD7 TOTAL SCORE: 6

## 2022-04-20 DIAGNOSIS — F41.1 GENERALIZED ANXIETY DISORDER: ICD-10-CM

## 2022-04-20 RX ORDER — CITALOPRAM HYDROBROMIDE 10 MG/1
10 TABLET ORAL DAILY
Qty: 30 TABLET | Refills: 1 | Status: SHIPPED | OUTPATIENT
Start: 2022-04-20 | End: 2022-05-11

## 2022-04-20 NOTE — TELEPHONE ENCOUNTER
SSRIs Protocol Passed 04/20/2022 12:08 PM   Protocol Details  Recent (12 mo) or future (30 days) visit within the authorizing provider's specialty    Medication is active on med list    Patient is age 18 or older    No active pregnancy on record    No positive pregnancy test in last 12 months

## 2022-04-21 ENCOUNTER — TRANSFERRED RECORDS (OUTPATIENT)
Dept: HEALTH INFORMATION MANAGEMENT | Facility: CLINIC | Age: 42
End: 2022-04-21
Payer: COMMERCIAL

## 2022-05-11 ENCOUNTER — MYC MEDICAL ADVICE (OUTPATIENT)
Dept: FAMILY MEDICINE | Facility: CLINIC | Age: 42
End: 2022-05-11
Payer: COMMERCIAL

## 2022-05-11 DIAGNOSIS — F41.1 GENERALIZED ANXIETY DISORDER: ICD-10-CM

## 2022-05-11 RX ORDER — CITALOPRAM HYDROBROMIDE 10 MG/1
10 TABLET ORAL DAILY
Qty: 90 TABLET | Refills: 0 | Status: SHIPPED | OUTPATIENT
Start: 2022-05-11 | End: 2022-09-09

## 2022-05-11 NOTE — TELEPHONE ENCOUNTER
Prescription approved per [facility] Refill Protocol.  ----  Routing to refill pool.    JOANN Denton RN  [facility]

## 2022-06-09 ENCOUNTER — VIRTUAL VISIT (OUTPATIENT)
Dept: GASTROENTEROLOGY | Facility: CLINIC | Age: 42
End: 2022-06-09
Payer: COMMERCIAL

## 2022-06-09 VITALS — WEIGHT: 135 LBS | BODY MASS INDEX: 22.47 KG/M2

## 2022-06-09 DIAGNOSIS — F41.9 ANXIETY: Primary | ICD-10-CM

## 2022-06-09 PROCEDURE — 99214 OFFICE O/P EST MOD 30 MIN: CPT | Mod: 95 | Performed by: PHYSICIAN ASSISTANT

## 2022-06-09 ASSESSMENT — ENCOUNTER SYMPTOMS
DECREASED LIBIDO: 0
NIGHT SWEATS: 0
CONSTIPATION: 1
ARTHRALGIAS: 0
NECK PAIN: 0
HOT FLASHES: 0
INCREASED ENERGY: 0
PANIC: 0
BACK PAIN: 1
VOMITING: 0
JOINT SWELLING: 0
FEVER: 0
CHILLS: 0
RECTAL PAIN: 0
HALLUCINATIONS: 0
INSOMNIA: 0
POLYPHAGIA: 0
WEIGHT LOSS: 1
DECREASED CONCENTRATION: 0
STIFFNESS: 0
DECREASED APPETITE: 1
BLOOD IN STOOL: 0
MYALGIAS: 1
WEIGHT GAIN: 0
DEPRESSION: 0
BLOATING: 1
HEARTBURN: 1
ALTERED TEMPERATURE REGULATION: 0
NAUSEA: 1
DIARRHEA: 1
FATIGUE: 0
POLYDIPSIA: 0
MUSCLE CRAMPS: 0
ABDOMINAL PAIN: 0
MUSCLE WEAKNESS: 0
JAUNDICE: 0
BOWEL INCONTINENCE: 0
NERVOUS/ANXIOUS: 1

## 2022-06-09 ASSESSMENT — PAIN SCALES - GENERAL: PAINLEVEL: NO PAIN (0)

## 2022-06-09 NOTE — PROGRESS NOTES
Allyn is a 41 year old who is being evaluated via a billable video visit.      How would you like to obtain your AVS? MyChart  If the video visit is dropped, the invitation should be resent by: Send to e-mail at: [email or web address]  Will anyone else be joining your video visit? No      Video Start Time: 9:32 AM  Video-Visit Details    Type of service:  Video Visit    Video End Time:9:43 AM    Originating Location (pt. Location): Home    Distant Location (provider location):  [facility] [place]     Platform used for Video Visit: [facility]     GI CLINIC VISIT    CC/REFERRING MD:  Noelle Lundy  REASON FOR FOLLOW UP: GERD and irregular bowel pattern    ASSESSMENT/PLAN:  41-year-old female with past medical history to include anxiety, ruptured ovarian cyst, palpitations, who presents to the GI clinic for consultation regarding GERD.       1.  GERD without esophagitis: This is relatively well managed with Prilosec 20 mg daily and Pepcid as needed.  She has identified occasional triggers and avoids this in addition to understanding possible anxiety triggers leading to exacerbation of symptoms which is under better control.  -- Continue Prilosec 20 mg daily, take this 30 to 60 minutes prior to any oral intake   -- lifestyle modifications of GERD reinforced    2.  Gluten sensitivity: Although serologies do not suggest celiac disease, her duodenal biopsies are suspicious of at least gluten sensitivity.  There was an improvement in her bloating and regulation of her bowel pattern.  If patient is interested in minimizing antiacid medications, she may benefit from going gluten-free, improving her bowel pattern (decreased constipation) and in turn help manage her GERD symptoms.        3.  IBS: This has been most consistent with constipation in the past, she feels that this is under good control with dietary modifications.  She could try fiber as needed in the future.  This seems to also  heavily depend on her anxiety management.  She has initiated antianxiety medications in addition to regular therapy.  I have also placed a referral for GI health psychology which she is interested in pursuing.  -- GI health psychology referral placed    4. Colorectal cancer screening: No family history of colorectal cancer.  Recommend colonoscopy at the age of 45-50 unless symptomatic sooner.    RTC 3 months    Thank you for this consultation.  It was a pleasure to participate in the care of this patient; please contact us with any further questions.       Brian Michel PA-C  Division of Gastroenterology, Hepatology and Nutrition  [facility]      HPI  41-year-old female with past medical history to include anxiety, ruptured ovarian cyst, palpitations, who presents to the GI clinic for consultation regarding GERD.  Patient reports that ever since her second child she has had increasing symptoms of gastroesophageal reflux disease.  She noted specific triggers to include alcohol, caffeine, tomatoes, spicy foods, fatty foods and onions to be particularly offensive.  She denies any dysphasia or odynophagia.  A trial of Prilosec 20 mg daily was helpful and upon discontinuation had return of symptoms so restarted omeprazole 20 mg daily.  She denies any vomiting or nausea.  She also has a baseline of constipation since a child that is described as excessive bloating and often skipping days without bowel movements.  Occasional excessive straining.  Citrucel has significantly helped with this, but she is currently not taking this on a regular basis.    She had been evaluated at [place] [facility] who performed an upper endoscopy.  No evidence of esophagitis, but did have questionable findings of celiac to include increased intraepithelial lymphocytes and marsh to villous atrophy and crypt hyperplasia.  CBC and iron studies normal.  Vitamin D was low at 21.  TTG IgA and IgG, total IgA, DGP IgA and IgG were  within normal limits.    A trial of gluten-free diet improved bloating and improved regulation of bowel pattern.  She did not continue with gluten-free diet due to minimal improvement in other symptoms such as reflux.    Interval hx 6/9/22  She takes prilosec 20 mg and occasional pepcid when she eats triggers.    A few months ago started a new job with increased stress and one particular incidence had a girls trip remembered eating garlic just prior to the trip and noticed loose stools and abdominal pain for the whole trip.  She has since started an antianxiety medication and since this time, she has felt symptoms have improved. She has also been seeing atherapist.     Current bowel pattern is fairly regular, occasional may notice during times of menstruation. On average 1 BM once per day. Regarding reflux, this is largely well controlled with prilosec 20 mg daily and pepcid prn.     ROS:    No fevers or chills  No weight loss  No blurry vision, double vision or change in vision  No sore throat  No lymphadenopathy  No headache, paraesthesias, or weakness in a limb  No shortness of breath or wheezing  No arthralgias or myalgias  No rashes or skin changes  No odynophagia or dysphagia  No BRBPR, hematochezia, melena  No dysuria, frequency or urgency  No hot/cold intolerance or polyria  + anxiety     PROBLEM LIST  Patient Active Problem List    Diagnosis Date Noted     Type O blood, Rh positive 01/21/2022     Priority: Medium     Juvenile idiopathic scoliosis, unspecified spinal region 01/21/2022     Priority: Medium     Back pain 12/19/2017     Priority: Medium     CARDIOVASCULAR SCREENING; LDL GOAL LESS THAN 160 10/31/2010     Priority: Medium       PERTINENT PAST MEDICAL HISTORY:  Past Medical History:   Diagnosis Date     Anxiety     worse post-partum     Esophageal reflux 2018    started during pregnancy; got worse year later     Ovarian cyst rupture 04/2010     Palpitations     off and on for years; more off now      Stomach problems 2018    started post-pregnancy in 2018       PREVIOUS SURGERIES:  Past Surgical History:   Procedure Laterality Date     ZZC SPINAL FUSION,ANT,EA ADNL LEVEL      fused cervical vertebrae ?c3-4       PREVIOUS ENDOSCOPY:  EGD normal, gastric biopsies revealed mild reactive gastropathy, duodenal biopsies demonstrate changes suspicious for celiac disease including increased intraepithelial lymphocytes and marsh to villous atrophy and crypt hyperplasia.    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   Other Topics Concern     Parent/sibling w/ CABG, MI or angioplasty before 65F 55M? No   Social History Narrative    Caffeine intake/servings daily - 0    Calcium intake/servings daily - 3    Exercise 3 times weekly - describe yoga, runs, bikes    Sunscreen used - Yes    Seatbelts used - Yes    Guns stored in the home - Yes    Self Breast Exam - Yes    Pap test up to date -  Yes    Eye exam up  to date -  Yes    Dental exam up to date -  Yes    DEXA scan up to date -  No    Flex Sig/Colonoscopy up to date -  No    Mammography up to date -  No    Immunizations reviewed and up to date - Yes    Abuse: Current or Past (Physical, Sexual or Emotional) - No    Do you feel safe in your environment - Yes    Do you cope well with stress - Yes    Do you suffer from insomnia - No    Last updated by: Betzaida Mckeon  12/2/2014         Social Determinants of Health     Financial Resource Strain: Not on file   Food Insecurity: Not on file   Transportation Needs: Not on file   Physical Activity: Not on file   Stress: Not on file   Social Connections: Not on file   Intimate Partner Violence: Not on file   Housing Stability: Not on file       FAMILY HISTORY:  FH of CRC: None   FH of IBD: None  No Fhx of celiac  Family History   Problem Relation Age of Onset     Respiratory Mother         COPD     Psychotic Disorder Mother         Bi polar     Mental Illness Mother         diagnosed bipolar; depression     Lipids Father      Heart Disease Maternal Grandmother      Cerebrovascular Disease Maternal Grandmother      Cancer Paternal Aunt         breast 60s       Past/family/social history reviewed and no changes    PHYSICAL EXAMINATION:  Constitutional: aaox3, cooperative, pleasant, not dyspneic/diaphoretic, no acute distress  Vitals reviewed: Wt 61.2 kg (135 lb)   BMI 22.47 kg/m    Wt:   Wt Readings from Last 2 Encounters:   06/09/22 61.2 kg (135 lb)   02/18/22 66.1 kg (145 lb 12.8 oz)      Eyes: Sclera anicteric/injected  Ears/nose/mouth/throat: Normal oropharynx without ulcers or exudate, mucus membranes moist, hearing intact  Neck: supple, thyroid normal size  CV: No edema  Respiratory: Unlabored breathing  Lymph: No axillary, submandibular, supraclavicular or inguinal lymphadenopathy  Abd: Nondistended, +bs, no hepatosplenomegaly, nontender, no peritoneal signs  Skin: warm, perfused, no jaundice  Psych: Normal  affect  MSK: Normal gait      PERTINENT STUDIES:    Prenatal Office Visit on 04/18/2018   Component Date Value Ref Range Status     Beta HCG Qual IFA Urine 04/18/2018 Negative  NEG^Negative    Final     PAP 04/18/2018 NIL   Final     Copath Report 04/18/2018    Final                    Value:  Patient Name: SHRUTHI CHERY  MR#: [id number]  Specimen #: [id number]  Collected: 4/18/2018  Received: 4/18/2018  Reported: 4/20/2018 11:19  Ordering Phy(s): FRANK QUAN    For improved result formatting, select 'View Enhanced Report Format' under   Linked Documents section.    SPECIMEN/STAIN PROCESS:  Pap imaged thin layer prep screening (Surepath, FocalPoint with guided   screening)       Pap-Cyto x 1, HPV ordered x 1    SOURCE: Cervical, endocervical  ----------------------------------------------------------------   Pap imaged thin layer prep screening (Surepath, FocalPoint with guided   screening)  SPECIMEN ADEQUACY:  Satisfactory for evaluation.  -Transformation zone component absent.    CYTOLOGIC INTERPRETATION:    Negative for intraepithelial lesion or malignancy    Electronically signed out by:  ANUSHKA Ballesteros (ASCP)    Processed and screened at [facility]    CLINICAL HISTORY:    Post-partum, Previous normal pa                          p  Date of Last Pap: 8/25/15,    Papanicolaou Test Limitations:  Cervical cytology is a screening test with   limited sensitivity; regular  screening is critical for cancer prevention; Pap tests are primarily   effective for the diagnosis/prevention of  squamous cell carcinoma, not adenocarcinomas or other cancers.    TESTING LAB LOCATION:  [address]  [phone]    COLLECTION SITE:  Client:  [facility]  Location: [facility] (B)       HPV Source 04/18/2018 SurePath   Final     HPV 16 DNA 04/18/2018 Negative  NEG^Negative  Final     HPV 18 DNA 04/18/2018 Negative  NEG^Negative Final     Other HR HPV 04/18/2018 Negative  NEG^Negative Final     Final Diagnosis 04/18/2018 This patient's sample is negative for HPV DNA.   Final     Specimen Description 04/18/2018 Cervical Cells   Final

## 2022-06-09 NOTE — LETTER
6/9/2022         RE: Allyn Mcqueen  [address]  [place] [address]        Dear Colleague,    Thank you for referring your patient, Allyn Mcqueen, to the [facility] [place]. Please see a copy of my visit note below.    GI CLINIC VISIT    CC/REFERRING MD:  Noelle Lundy  REASON FOR FOLLOW UP: GERD and irregular bowel pattern    ASSESSMENT/PLAN:  41-year-old female with past medical history to include anxiety, ruptured ovarian cyst, palpitations, who presents to the GI clinic for consultation regarding GERD.       1.  GERD without esophagitis: This is relatively well managed with Prilosec 20 mg daily and Pepcid as needed.  She has identified occasional triggers and avoids this in addition to understanding possible anxiety triggers leading to exacerbation of symptoms which is under better control.  -- Continue Prilosec 20 mg daily, take this 30 to 60 minutes prior to any oral intake   -- lifestyle modifications of GERD reinforced    2.  Gluten sensitivity: Although serologies do not suggest celiac disease, her duodenal biopsies are suspicious of at least gluten sensitivity.  There was an improvement in her bloating and regulation of her bowel pattern.  If patient is interested in minimizing antiacid medications, she may benefit from going gluten-free, improving her bowel pattern (decreased constipation) and in turn help manage her GERD symptoms.        3.  IBS: This has been most consistent with constipation in the past, she feels that this is under good control with dietary modifications.  She could try fiber as needed in the future.  This seems to also heavily depend on her anxiety management.  She has initiated antianxiety medications in addition to regular therapy.  I have also placed a referral for GI health psychology which she is interested in pursuing.  -- GI health psychology referral placed    4. Colorectal cancer screening: No family history of  colorectal cancer.  Recommend colonoscopy at the age of 45-50 unless symptomatic sooner.    RTC 3 months    Thank you for this consultation.  It was a pleasure to participate in the care of this patient; please contact us with any further questions.       Brian Michel PA-C  Division of Gastroenterology, Hepatology and Nutrition  [facility]      HPI  41-year-old female with past medical history to include anxiety, ruptured ovarian cyst, palpitations, who presents to the GI clinic for consultation regarding GERD.  Patient reports that ever since her second child she has had increasing symptoms of gastroesophageal reflux disease.  She noted specific triggers to include alcohol, caffeine, tomatoes, spicy foods, fatty foods and onions to be particularly offensive.  She denies any dysphasia or odynophagia.  A trial of Prilosec 20 mg daily was helpful and upon discontinuation had return of symptoms so restarted omeprazole 20 mg daily.  She denies any vomiting or nausea.  She also has a baseline of constipation since a child that is described as excessive bloating and often skipping days without bowel movements.  Occasional excessive straining.  Citrucel has significantly helped with this, but she is currently not taking this on a regular basis.    She had been evaluated at [facility] who performed an upper endoscopy.  No evidence of esophagitis, but did have questionable findings of celiac to include increased intraepithelial lymphocytes and marsh to villous atrophy and crypt hyperplasia.  CBC and iron studies normal.  Vitamin D was low at 21.  TTG IgA and IgG, total IgA, DGP IgA and IgG were within normal limits.    A trial of gluten-free diet improved bloating and improved regulation of bowel pattern.  She did not continue with gluten-free diet due to minimal improvement in other symptoms such as reflux.    Interval hx 6/9/22  She takes prilosec 20 mg and occasional pepcid when she eats  triggers.    A few months ago started a new job with increased stress and one particular incidence had a girls trip remembered eating garlic just prior to the trip and noticed loose stools and abdominal pain for the whole trip.  She has since started an antianxiety medication and since this time, she has felt symptoms have improved. She has also been seeing atherapist.     Current bowel pattern is fairly regular, occasional may notice during times of menstruation. On average 1 BM once per day. Regarding reflux, this is largely well controlled with prilosec 20 mg daily and pepcid prn.     ROS:    No fevers or chills  No weight loss  No blurry vision, double vision or change in vision  No sore throat  No lymphadenopathy  No headache, paraesthesias, or weakness in a limb  No shortness of breath or wheezing  No arthralgias or myalgias  No rashes or skin changes  No odynophagia or dysphagia  No BRBPR, hematochezia, melena  No dysuria, frequency or urgency  No hot/cold intolerance or polyria  + anxiety     PROBLEM LIST  Patient Active Problem List    Diagnosis Date Noted     Type O blood, Rh positive 01/21/2022     Priority: Medium     Juvenile idiopathic scoliosis, unspecified spinal region 01/21/2022     Priority: Medium     Back pain 12/19/2017     Priority: Medium     CARDIOVASCULAR SCREENING; LDL GOAL LESS THAN 160 10/31/2010     Priority: Medium       PERTINENT PAST MEDICAL HISTORY:  Past Medical History:   Diagnosis Date     Anxiety     worse post-partum     Esophageal reflux 2018    started during pregnancy; got worse year later     Ovarian cyst rupture 04/2010     Palpitations     off and on for years; more off now     Stomach problems 2018    started post-pregnancy in 2018       PREVIOUS SURGERIES:  Past Surgical History:   Procedure Laterality Date     ZZC SPINAL FUSION,ANT,EA ADNL LEVEL      fused cervical vertebrae ?c3-4       PREVIOUS ENDOSCOPY:  EGD normal, gastric biopsies revealed mild reactive  gastropathy, duodenal biopsies demonstrate changes suspicious for celiac disease including increased intraepithelial lymphocytes and marsh to villous atrophy and crypt hyperplasia.    ALLERGIES:     Allergies   Allergen Reactions     Adhesive Tape Rash       PERTINENT MEDICATIONS:    Current Outpatient Medications:      citalopram (CELEXA) 10 MG tablet, Take 1 tablet (10 mg) by mouth daily, Disp: 90 tablet, Rfl: 0     levonorgestrel (MIRENA) 20 MCG/24HR IUD, 1 each by Intrauterine route once, Disp: , Rfl:      omeprazole (PRILOSEC) 20 MG DR capsule, Take 1 capsule (20 mg) by mouth daily, Disp: 30 capsule, Rfl: 3    SOCIAL HISTORY:  Works for [employer]  Social History     Socioeconomic History     Marital status:      Spouse name: Not on file     Number of children: Not on file     Years of education: Not on file     Highest education level: Not on file   Occupational History     Employer: [employer]     Comment: organizer [employer]   Tobacco Use     Smoking status: Never Smoker     Smokeless tobacco: Never Used   Substance and Sexual Activity     Alcohol use: Yes     Alcohol/week: 0.0 standard drinks     Comment: occ     Drug use: No     Sexual activity: Yes     Partners: Male     Birth control/protection: I.U.D.   Other Topics Concern     Parent/sibling w/ CABG, MI or angioplasty before 65F 55M? No   Social History Narrative    Caffeine intake/servings daily - 0    Calcium intake/servings daily - 3    Exercise 3 times weekly - describe yoga, runs, bikes    Sunscreen used - Yes    Seatbelts used - Yes    Guns stored in the home - Yes    Self Breast Exam - Yes    Pap test up to date -  Yes    Eye exam up to date -  Yes    Dental exam up to date -  Yes    DEXA scan up to date -  No    Flex Sig/Colonoscopy up to date -  No    Mammography up to date -  No    Immunizations reviewed and up to date - Yes    Abuse: Current or Past (Physical, Sexual or Emotional) - No    Do you feel safe in your  environment - Yes    Do you cope well with stress - Yes    Do you suffer from insomnia - No    Last updated by: Betzaida Mckeon  12/2/2014         Social Determinants of Health     Financial Resource Strain: Not on file   Food Insecurity: Not on file   Transportation Needs: Not on file   Physical Activity: Not on file   Stress: Not on file   Social Connections: Not on file   Intimate Partner Violence: Not on file   Housing Stability: Not on file       FAMILY HISTORY:  FH of CRC: None   FH of IBD: None  No Fhx of celiac  Family History   Problem Relation Age of Onset     Respiratory Mother         COPD     Psychotic Disorder Mother         Bi polar     Mental Illness Mother         diagnosed bipolar; depression     Lipids Father      Heart Disease Maternal Grandmother      Cerebrovascular Disease Maternal Grandmother      Cancer Paternal Aunt         breast 60s       Past/family/social history reviewed and no changes    PHYSICAL EXAMINATION:  Constitutional: aaox3, cooperative, pleasant, not dyspneic/diaphoretic, no acute distress  Vitals reviewed: Wt 61.2 kg (135 lb)   BMI 22.47 kg/m    Wt:   Wt Readings from Last 2 Encounters:   06/09/22 61.2 kg (135 lb)   02/18/22 66.1 kg (145 lb 12.8 oz)      Eyes: Sclera anicteric/injected  Ears/nose/mouth/throat: Normal oropharynx without ulcers or exudate, mucus membranes moist, hearing intact  Neck: supple, thyroid normal size  CV: No edema  Respiratory: Unlabored breathing  Lymph: No axillary, submandibular, supraclavicular or inguinal lymphadenopathy  Abd: Nondistended, +bs, no hepatosplenomegaly, nontender, no peritoneal signs  Skin: warm, perfused, no jaundice  Psych: Normal affect  MSK: Normal gait      PERTINENT STUDIES:    Prenatal Office Visit on 04/18/2018   Component Date Value Ref Range Status     Beta HCG Qual IFA Urine 04/18/2018 Negative  NEG^Negative    Final     PAP 04/18/2018 NIL   Final     Copath Report 04/18/2018    Final                     Value:  Patient Name: SHRUTHI CHERY  MR#: [id number]  Specimen #: [id number]  Collected: 4/18/2018  Received: 4/18/2018  Reported: 4/20/2018 11:19  Ordering Phy(s): FRANK QUAN    For improved result formatting, select 'View Enhanced Report Format' under   Linked Documents section.    SPECIMEN/STAIN PROCESS:  Pap imaged thin layer prep screening (Surepath, FocalPoint with guided   screening)       Pap-Cyto x 1, HPV ordered x 1    SOURCE: Cervical, endocervical  ----------------------------------------------------------------   Pap imaged thin layer prep screening (Surepath, FocalPoint with guided   screening)  SPECIMEN ADEQUACY:  Satisfactory for evaluation.  -Transformation zone component absent.    CYTOLOGIC INTERPRETATION:    Negative for intraepithelial lesion or malignancy    Electronically signed out by:  ANUSHKA Ballesteros (ASCP)    Processed and screened at [facility]    CLINICAL HISTORY:    Post-partum, Previous normal pa                          p  Date of Last Pap: 8/25/15,    Papanicolaou Test Limitations:  Cervical cytology is a screening test with   limited sensitivity; regular  screening is critical for cancer prevention; Pap tests are primarily   effective for the diagnosis/prevention of  squamous cell carcinoma, not adenocarcinomas or other cancers.    TESTING LAB LOCATION:  [address]  [phone]    COLLECTION SITE:  Client:  [facility]  Location: [facility] (B)       HPV Source 04/18/2018 SurePath   Final     HPV 16 DNA 04/18/2018 Negative  NEG^Negative Final     HPV 18 DNA 04/18/2018 Negative  NEG^Negative Final     Other HR HPV 04/18/2018 Negative  NEG^Negative Final     Final Diagnosis 04/18/2018 This patient's sample is negative for HPV DNA.   Final     Specimen Description 04/18/2018 Cervical Cells   Final            Sincerely,    Brian Michel PA-C

## 2022-06-09 NOTE — PATIENT INSTRUCTIONS
It was a pleasure taking care of you today.  I've included a brief summary of our discussion and care plan from today's visit below.  Please review this information with your primary care provider.  ______________________________________________________________________    My recommendations are summarized as follows:    -- Continue prilosec 20 mg daily     Return to GI Clinic as needed to review your progress.    ______________________________________________________________________    How do I schedule labs, imaging studies, or procedures that were ordered in clinic today?     Labs: To schedule lab appointment at the [facility], use my chart or call [phone]. If you have a [place] lab closer to home where you are regularly seen you can give them a call.     Procedures: If a colonoscopy, upper endoscopy, breath test, esophageal manometry, or pH impedence was ordered today, our endoscopy team will call you to schedule this. If you have not heard from our endoscopy team within a week, please call [phone] to schedule.     Imaging Studies: If you were scheduled for a CT scan, X-ray, MRI, ultrasound, HIDA scan or other imaging study, please call [phone] to have this scheduled.     Referral: If a referral to another specialty was ordered, expect a phone call or follow instructions above. If you have not heard from anyone regarding your referral in a week, please call our clinic to check the status.     Who do I call with any questions after my visit?  Please be in touch if there are any further questions that arise following today's visit.  There are multiple ways to contact your gastroenterology care team.      During business hours, you may reach a Gastroenterology nurse at [phone]    To schedule or reschedule an appointment, please call [phone].     You can always send a secure message through [employer].  [employer] messages are answered by your nurse or doctor typically within 24  hours.  Please allow extra time on weekends and holidays.      For urgent/emergent questions after business hours, you may reach the on-call GI Fellow by contacting the [facility]  at [phone].     How will I get the results of any tests ordered?    You will receive all of your results.  If you have signed up for [employer], any tests ordered at your visit will be available to you after your physician reviews them.  Typically this takes 1-2 weeks.  If there are urgent results that require a change in your care plan, your physician or nurse will call you to discuss the next steps.      What is [employer]?  [employer] is a secure way for you to access all of your healthcare records from the [facility].  It is a web based computer program, so you can sign on to it from any location.  It also allows you to send secure messages to your care team.  I recommend signing up for [employer] access if you have not already done so and are comfortable with using a computer.         Sincerely,    Brian Michel PA-C  [facility]  Division of Gastroenterology

## 2022-06-27 ENCOUNTER — E-VISIT (OUTPATIENT)
Dept: FAMILY MEDICINE | Facility: CLINIC | Age: 42
End: 2022-06-27
Payer: COMMERCIAL
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
## 2022-06-27 PROCEDURE — 99207 PR NON-BILLABLE SERV PER CHARTING: CPT | Performed by: FAMILY MEDICINE

## 2022-09-09 RX ORDER — CITALOPRAM HYDROBROMIDE 10 MG/1
10 TABLET ORAL DAILY
Qty: 90 TABLET | Refills: 0 | Status: SHIPPED | OUTPATIENT
Start: 2022-09-09 | End: 2022-12-14

## 2022-10-10 ENCOUNTER — ANCILLARY PROCEDURE (OUTPATIENT)
Dept: MAMMOGRAPHY | Facility: CLINIC | Age: 42
End: 2022-10-10
Attending: FAMILY MEDICINE
Payer: COMMERCIAL

## 2022-10-10 DIAGNOSIS — Z12.31 VISIT FOR SCREENING MAMMOGRAM: ICD-10-CM

## 2022-10-10 PROCEDURE — 77063 BREAST TOMOSYNTHESIS BI: CPT | Mod: GC | Performed by: STUDENT IN AN ORGANIZED HEALTH CARE EDUCATION/TRAINING PROGRAM

## 2022-10-10 PROCEDURE — 77067 SCR MAMMO BI INCL CAD: CPT | Mod: GC | Performed by: STUDENT IN AN ORGANIZED HEALTH CARE EDUCATION/TRAINING PROGRAM

## 2022-10-19 ENCOUNTER — E-VISIT (OUTPATIENT)
Dept: URGENT CARE | Facility: CLINIC | Age: 42
End: 2022-10-19
Payer: COMMERCIAL

## 2022-10-19 DIAGNOSIS — H92.01 OTALGIA, RIGHT: ICD-10-CM

## 2022-10-19 DIAGNOSIS — J01.90 ACUTE SINUSITIS, RECURRENCE NOT SPECIFIED, UNSPECIFIED LOCATION: Primary | ICD-10-CM

## 2022-10-19 PROCEDURE — 99421 OL DIG E/M SVC 5-10 MIN: CPT | Performed by: EMERGENCY MEDICINE

## 2022-10-19 RX ORDER — FLUTICASONE PROPIONATE 50 MCG
1 SPRAY, SUSPENSION (ML) NASAL DAILY
Qty: 9.9 ML | Refills: 0 | Status: SHIPPED | OUTPATIENT
Start: 2022-10-19 | End: 2023-03-16

## 2022-10-19 RX ORDER — GUAIFENESIN 1200 MG/1
1200 TABLET, EXTENDED RELEASE ORAL 2 TIMES DAILY
Qty: 60 TABLET | Refills: 0 | Status: SHIPPED | OUTPATIENT
Start: 2022-10-19 | End: 2023-03-16

## 2022-10-19 NOTE — PATIENT INSTRUCTIONS
The symptoms you describe suggest a viral cause, which is much more common than a bacterial cause. Antibiotics will treat bacterial infections, but have no effect on viral infections. If possible, especially if improving, start with symptom care for the first 7-10 days, then consider seeking further treatment or taking an antibiotic. Bacterial infections generally are more severe, including symptoms such as pus, fever over 101degrees F, or rapidly worsening.  You may want to try a nasal lavage (also known as nasal irrigation). You can find over-the-counter products, such as Neti-Pot, at retail locations or make your own at home. Instructions for homemade nasal lavage and more information on the process are available online at http://www.aafp.org/afp/2009/1115/p1121.html.    Dear Allyn Mcqueen    After reviewing your responses, I've been able to diagnose you with?a sinus infection; the vast majority of sinus infections are viral or related to allergies and do not respond to antibiotics.  I suggest getting a COVID PCR and influenza testing, these were ordered for a lab only visit at any [facility] Location    Based on your responses and diagnosis, I have prescribed Flonase and Mucinex to treat your symptoms. I have sent this to your pharmacy.?     It is also important to stay well hydrated, get lots of rest and take over-the-counter decongestants,?tylenol?or ibuprofen if you?are able to?take those medications per your primary care provider to help relieve discomfort.?     It is important that you take?all of?your prescribed medication even if your symptoms are improving after a few doses.? Taking?all of?your medicine helps prevent the symptoms from returning.?     If your symptoms worsen, you develop severe headache, vomiting, high fever (>102), or are not improving in 7 days, please contact your primary care provider for an appointment or visit any of our convenient Walk-in Care or Urgent Care Centers  to be seen which can be found on our website?here.?     Thanks again for choosing?us?as your health care partner,?   ?  Jose Dexter PA-C?

## 2022-11-28 ENCOUNTER — MEDICAL CORRESPONDENCE (OUTPATIENT)
Dept: HEALTH INFORMATION MANAGEMENT | Facility: CLINIC | Age: 42
End: 2022-11-28

## 2022-11-29 ENCOUNTER — TRANSCRIBE ORDERS (OUTPATIENT)
Dept: OTHER | Age: 42
End: 2022-11-29

## 2022-11-29 DIAGNOSIS — H65.119: Primary | ICD-10-CM

## 2022-12-15 ENCOUNTER — TRANSFERRED RECORDS (OUTPATIENT)
Dept: HEALTH INFORMATION MANAGEMENT | Facility: CLINIC | Age: 42
End: 2022-12-15

## 2023-01-10 ASSESSMENT — ENCOUNTER SYMPTOMS
CONSTIPATION: 0
HEMATOCHEZIA: 0
CHILLS: 0
HEARTBURN: 1
NERVOUS/ANXIOUS: 0
DIARRHEA: 0
NAUSEA: 0
FEVER: 0
MYALGIAS: 1
PALPITATIONS: 0
ARTHRALGIAS: 0
DYSURIA: 0
EYE PAIN: 0
HEMATURIA: 0
PARESTHESIAS: 0
DIZZINESS: 0
SHORTNESS OF BREATH: 0
SORE THROAT: 0
HEADACHES: 1
FREQUENCY: 0
COUGH: 0
WEAKNESS: 0
JOINT SWELLING: 0
ABDOMINAL PAIN: 0
BREAST MASS: 0

## 2023-01-13 ENCOUNTER — OFFICE VISIT (OUTPATIENT)
Dept: FAMILY MEDICINE | Facility: CLINIC | Age: 43
End: 2023-01-13
Payer: COMMERCIAL

## 2023-01-13 VITALS
WEIGHT: 144 LBS | SYSTOLIC BLOOD PRESSURE: 124 MMHG | HEART RATE: 76 BPM | BODY MASS INDEX: 24.59 KG/M2 | TEMPERATURE: 98.3 F | RESPIRATION RATE: 16 BRPM | OXYGEN SATURATION: 99 % | HEIGHT: 64 IN | DIASTOLIC BLOOD PRESSURE: 68 MMHG

## 2023-01-13 DIAGNOSIS — Z11.59 NEED FOR HEPATITIS C SCREENING TEST: ICD-10-CM

## 2023-01-13 DIAGNOSIS — Z12.4 CERVICAL CANCER SCREENING: ICD-10-CM

## 2023-01-13 DIAGNOSIS — Z13.6 CARDIOVASCULAR SCREENING; LDL GOAL LESS THAN 160: ICD-10-CM

## 2023-01-13 DIAGNOSIS — Z00.00 ROUTINE GENERAL MEDICAL EXAMINATION AT A HEALTH CARE FACILITY: Primary | ICD-10-CM

## 2023-01-13 PROBLEM — K21.9 GASTROESOPHAGEAL REFLUX DISEASE: Status: ACTIVE | Noted: 2022-10-21

## 2023-01-13 PROBLEM — M54.9 BACK PAIN: Status: RESOLVED | Noted: 2017-12-19 | Resolved: 2023-01-13

## 2023-01-13 PROBLEM — J30.9 ALLERGIC RHINITIS: Status: ACTIVE | Noted: 2022-11-21

## 2023-01-13 PROBLEM — F41.9 ANXIETY: Status: ACTIVE | Noted: 2022-10-21

## 2023-01-13 PROBLEM — H66.90 RECURRENT ACUTE OTITIS MEDIA: Status: ACTIVE | Noted: 2022-11-21

## 2023-01-13 PROCEDURE — 99396 PREV VISIT EST AGE 40-64: CPT | Performed by: FAMILY MEDICINE

## 2023-01-13 PROCEDURE — 87624 HPV HI-RISK TYP POOLED RSLT: CPT | Performed by: FAMILY MEDICINE

## 2023-01-13 PROCEDURE — G0145 SCR C/V CYTO,THINLAYER,RESCR: HCPCS | Performed by: FAMILY MEDICINE

## 2023-01-13 RX ORDER — LORATADINE 10 MG/1
TABLET ORAL EVERY 24 HOURS
COMMUNITY
End: 2023-03-16

## 2023-01-13 RX ORDER — FLUTICASONE PROPIONATE 50 MCG
SPRAY, SUSPENSION (ML) NASAL EVERY 24 HOURS
COMMUNITY
End: 2023-01-13

## 2023-01-13 ASSESSMENT — ENCOUNTER SYMPTOMS
EYE PAIN: 0
COUGH: 0
SORE THROAT: 0
MYALGIAS: 1
FREQUENCY: 0
PALPITATIONS: 0
BREAST MASS: 0
PARESTHESIAS: 0
JOINT SWELLING: 0
HEMATOCHEZIA: 0
FEVER: 0
ARTHRALGIAS: 0
HEADACHES: 1
DYSURIA: 0
NERVOUS/ANXIOUS: 0
CHILLS: 0
ABDOMINAL PAIN: 0
SHORTNESS OF BREATH: 0
HEARTBURN: 1
NAUSEA: 0
WEAKNESS: 0
DIARRHEA: 0
HEMATURIA: 0
CONSTIPATION: 0
DIZZINESS: 0

## 2023-01-13 NOTE — PROGRESS NOTES
SUBJECTIVE:   CC: Allyn is an 42 year old who presents for preventive health visit.   Patient has been advised of split billing requirements and indicates understanding: Yes  Healthy Habits:     Getting at least 3 servings of Calcium per day:  Yes    Bi-annual eye exam:  NO    Dental care twice a year:  Yes    Sleep apnea or symptoms of sleep apnea:  None    Diet:  Regular (no restrictions)    Frequency of exercise:  4-5 days/week    Duration of exercise:  15-30 minutes    Taking medications regularly:  No    Barriers to taking medications:  None    Medication side effects:  None    PHQ-2 Total Score: 0    Additional concerns today:  Yes    Today's PHQ-2 Score:   PHQ-2 ( 1999 Pfizer) 1/10/2023   Q1: Little interest or pleasure in doing things 0   Q2: Feeling down, depressed or hopeless 0   PHQ-2 Score 0   PHQ-2 Total Score (12-17 Years)- Positive if 3 or more points; Administer PHQ-A if positive -   Q1: Little interest or pleasure in doing things Not at all   Q2: Feeling down, depressed or hopeless Not at all   PHQ-2 Score 0           Social History     Tobacco Use     Smoking status: Never     Smokeless tobacco: Never   Substance Use Topics     Alcohol use: Yes     Alcohol/week: 0.0 standard drinks     Comment: occ     If you drink alcohol do you typically have >3 drinks per day or >7 drinks per week? No    No flowsheet data found.    Reviewed orders with patient.  Reviewed health maintenance and updated orders accordingly - Yes  BP Readings from Last 3 Encounters:   01/13/23 124/68   02/18/22 116/69   01/21/22 122/80    Wt Readings from Last 3 Encounters:   01/13/23 65.3 kg (144 lb)   06/09/22 61.2 kg (135 lb)   02/18/22 66.1 kg (145 lb 12.8 oz)                  Patient Active Problem List   Diagnosis     CARDIOVASCULAR SCREENING; LDL GOAL LESS THAN 160     Type O blood, Rh positive     Juvenile idiopathic scoliosis, unspecified spinal region     Allergic rhinitis     Anxiety     Gastroesophageal reflux  disease     Recurrent acute otitis media     Past Surgical History:   Procedure Laterality Date     ZZC SPINAL FUSION,ANT,EA ADNL LEVEL      fused cervical vertebrae ?c3-4       Social History     Tobacco Use     Smoking status: Never     Smokeless tobacco: Never   Substance Use Topics     Alcohol use: Yes     Alcohol/week: 0.0 standard drinks     Comment: occ     Family History   Problem Relation Age of Onset     Respiratory Mother         COPD     Psychotic Disorder Mother         Bi polar     Mental Illness Mother         diagnosed bipolar; depression     Lipids Father      Heart Disease Maternal Grandmother      Cerebrovascular Disease Maternal Grandmother      Cancer Paternal Aunt         breast 60s         Current Outpatient Medications   Medication Sig Dispense Refill     citalopram (CELEXA) 10 MG tablet Take 1 tablet (10 mg) by mouth daily 90 tablet 3     fluticasone (FLONASE) 50 MCG/ACT nasal spray Spray 1 spray into both nostrils daily 9.9 mL 0     guaiFENesin 1200 MG TB12 Take 1 tablet (1,200 mg) by mouth 2 times daily 60 tablet 0     levonorgestrel (MIRENA) 20 MCG/24HR IUD 1 each by Intrauterine route once       loratadine (CLARITIN) 10 MG tablet every 24 hours       omeprazole (PRILOSEC) 20 MG DR capsule Take 1 capsule (20 mg) by mouth daily 30 capsule 3     Pseudoephedrine HCl (SUDAFED 24 HOUR PO) Sudafed       Allergies   Allergen Reactions     Adhesive Tape Rash     No lab results found.     Breast Cancer Screening:    Breast CA Risk Assessment (FHS-7) 1/20/2022   Do you have a family history of breast, colon, or ovarian cancer? No / Unknown      History of abnormal Pap smear: NO - age 30-65 PAP every 5 years with negative HPV co-testing recommended  PAP / HPV Latest Ref Rng & Units 4/18/2018 8/25/2015 7/30/2012   PAP (Historical) - NIL NIL NIL   HPV16 NEG:Negative Negative Negative -   HPV18 NEG:Negative Negative Negative -   HRHPV NEG:Negative Negative Negative -     Reviewed and updated as needed  this visit by clinical staff     Meds              Reviewed and updated as needed this visit by Provider                 Past Medical History:   Diagnosis Date     Anxiety     worse post-partum     Esophageal reflux 2018    started during pregnancy; got worse year later     Ovarian cyst rupture 2010     Palpitations     off and on for years; more off now     Stomach problems 2018    started post-pregnancy in 2018      Past Surgical History:   Procedure Laterality Date     ZZC SPINAL FUSION,ANT,EA ADNL LEVEL      fused cervical vertebrae ?c3-4     OB History    Para Term  AB Living   2 2 2 0 0 2   SAB IAB Ectopic Multiple Live Births   0 0 0 0 2      # Outcome Date GA Lbr Hill/2nd Weight Sex Delivery Anes PTL Lv   2 Term 18 39w6d  3.912 kg (8 lb 10 oz) M Vag-Spont  N MACIE      Name: Jean      Apgar1: 9  Apgar5: 9   1 Term 07/14/15 39w3d 09:10 / 02:00 3.719 kg (8 lb 3.2 oz) M Vag-Spont EPI  MACIE      Name: Manuel      Apgar1: 9  Apgar5: 9     Review of Systems   Constitutional: Negative for chills and fever.   HENT: Positive for ear pain. Negative for congestion, hearing loss and sore throat.    Eyes: Negative for pain and visual disturbance.   Respiratory: Negative for cough and shortness of breath.    Cardiovascular: Negative for chest pain, palpitations and peripheral edema.   Gastrointestinal: Positive for heartburn. Negative for abdominal pain, constipation, diarrhea, hematochezia and nausea.   Breasts:  Negative for tenderness, breast mass and discharge.   Genitourinary: Negative for dysuria, frequency, genital sores, hematuria, pelvic pain, urgency, vaginal bleeding and vaginal discharge.   Musculoskeletal: Positive for myalgias. Negative for arthralgias and joint swelling.   Skin: Negative for rash.   Neurological: Positive for headaches. Negative for dizziness, weakness and paresthesias.   Psychiatric/Behavioral: Negative for mood changes. The patient is not nervous/anxious.      "  OBJECTIVE:   /68 (BP Location: Right arm, Patient Position: Sitting, Cuff Size: Adult Regular)   Pulse 76   Temp 98.3  F (36.8  C) (Oral)   Resp 16   Ht 1.613 m (5' 3.5\")   Wt 65.3 kg (144 lb)   SpO2 99%   Breastfeeding No   BMI 25.11 kg/m    Physical Exam  GENERAL: healthy, alert and no distress  EYES: Eyes grossly normal to inspection, PERRL and conjunctivae and sclerae normal  HENT: ear canals and TM's normal, nose and mouth without ulcers or lesions  NECK: no adenopathy, no asymmetry, masses, or scars and thyroid normal to palpation  RESP: lungs clear to auscultation - no rales, rhonchi or wheezes  BREAST: normal without masses, tenderness or nipple discharge and no palpable axillary masses or adenopathy  CV: regular rate and rhythm, normal S1 S2, no S3 or S4, no murmur, click or rub, no peripheral edema and peripheral pulses strong  ABDOMEN: soft, nontender, no hepatosplenomegaly, no masses and bowel sounds normal   (female): normal female external genitalia, normal urethral meatus, vaginal mucosa pink, moist, well rugated, and normal cervix/adnexa/uterus without masses or discharge, dark IUD strings visible protruding 1 cm from cervical os  MS: no gross musculoskeletal defects noted, no edema  SKIN: no suspicious lesions or rashes  NEURO: Normal strength and tone, mentation intact and speech normal  PSYCH: mentation appears normal, affect normal/bright    ASSESSMENT/PLAN:   (Z00.00) Routine general medical examination at a health care facility  (primary encounter diagnosis)  (Z12.4) Cervical cancer screening  Comment: routine healthy 42 year old woman  Plan: Pap Screen with HPV - recommended age 30 - 65         years          (Z13.6) CARDIOVASCULAR SCREENING; LDL GOAL LESS THAN 160  Comment:   Plan: Lipid panel reflex to direct LDL Fasting            (Z11.59) Need for hepatitis C screening test  Comment:   Plan: Hepatitis C Screen Reflex to HCV RNA Quant and         Genotype          Return " to clinic for fasting labs    COUNSELING:  Reviewed preventive health counseling, as reflected in patient instructions      No results for input(s): CHOL, HDL, LDL, TRIG, CHOLHDLRATIO in the last 87787 hours.  She reports that she has never smoked. She has never used smokeless tobacco.          Noelle Lundy MD  [facility]

## 2023-01-17 LAB
BKR LAB AP GYN ADEQUACY: NORMAL
BKR LAB AP GYN INTERPRETATION: NORMAL
BKR LAB AP HPV REFLEX: NORMAL
BKR LAB AP PREVIOUS ABNORMAL: NORMAL
PATH REPORT.COMMENTS IMP SPEC: NORMAL
PATH REPORT.COMMENTS IMP SPEC: NORMAL
PATH REPORT.RELEVANT HX SPEC: NORMAL

## 2023-01-19 LAB
HUMAN PAPILLOMA VIRUS 16 DNA: NEGATIVE
HUMAN PAPILLOMA VIRUS 18 DNA: NEGATIVE
HUMAN PAPILLOMA VIRUS FINAL DIAGNOSIS: NORMAL
HUMAN PAPILLOMA VIRUS OTHER HR: NEGATIVE

## 2023-03-14 ENCOUNTER — MYC MEDICAL ADVICE (OUTPATIENT)
Dept: FAMILY MEDICINE | Facility: CLINIC | Age: 43
End: 2023-03-14
Payer: COMMERCIAL

## 2023-03-15 ENCOUNTER — E-VISIT (OUTPATIENT)
Dept: FAMILY MEDICINE | Facility: CLINIC | Age: 43
End: 2023-03-15
Payer: COMMERCIAL

## 2023-03-15 DIAGNOSIS — U07.1 INFECTION DUE TO 2019 NOVEL CORONAVIRUS: Primary | ICD-10-CM

## 2023-03-15 PROCEDURE — 99421 OL DIG E/M SVC 5-10 MIN: CPT | Performed by: FAMILY MEDICINE

## 2023-03-16 PROBLEM — K21.9 GASTROESOPHAGEAL REFLUX DISEASE: Status: RESOLVED | Noted: 2022-10-21 | Resolved: 2023-03-16

## 2023-03-16 PROBLEM — K21.9 GASTROESOPHAGEAL REFLUX DISEASE WITHOUT ESOPHAGITIS: Status: ACTIVE | Noted: 2019-11-13

## 2023-03-16 NOTE — TELEPHONE ENCOUNTER
"Provider E-Visit time total (minutes): 8 minutes    ASSESSMENT / PLAN:  (U07.1) Infection due to 2019 novel coronavirus  (primary encounter diagnosis)  Comment:   Thank you for choosing us for your care, sounds like a really rough \"vacation\"!    I recommend stopping your antibiotics. I think you've taken enough that you don't have to continue or take another one at this time, but please let me know if you're still having GI symptoms or if new ones develop.    Given everything going on, I do feel that you could certainly take Paxlovid for covid treatment, just to try to help you feel better sooner. Paxlovid can cause nausea, but should reduce risk of serious illness.  I'll prescribe it for you, if you do decide to take it, please start it as soon as possible.    If you're not feeling better within 5-7 days, please schedule an in person appointment because I'd recommend lab work and perhaps a repeat EKG. Certainly if symptoms worsen quickly, please go to ER.      You can schedule an appointment right here in [facility], or call [phone]  If the visit is for the same symptoms as your eVisit, we'll refund the cost of your eVisit if seen within seven days.      Sincerely,  Dr. Noelle Lundy MD  3/16/2023    Plan: nirmatrelvir and ritonavir (PAXLOVID) 300         mg/100 mg therapy pack               HPI:  I was prescribed Cefixima 400 mg while i was in [place]. I have taken only 3 pills but they also gave me IV antibiotics in the hospital for a gastro infection. I woke up today with a mild trunk rash. Should I continue the antibiotics or not?         Also - I now have COVID (first day Monday; tested positive Tues)        Background:    I was taken to the ER in [facility] because I fainted and vomited and was unresponsive for 3-5 mins on my first evening there (Thurs). They detected an arrhythmia at the hospital and determined the arrhythmia was exacerbated by the gastric infection, which they treated with antibiotics.     "    I have had a trunk rash in response to antobiotics before    Have you had these symptoms before? Yes   How long have you been having these symptoms? Just today   Please list any medications you are currently taking for this condition. None but could take benedryl.   Please describe any probable cause for these symptoms.  Reaction to antibiotix cefixima   Wrap up    Anything else you would like to add?    Are you pregnant or breastfeeding? I am confident that I am neither     No results found for: GFRESTIMATED  No results found for: GFRESTBLACK

## 2023-03-16 NOTE — PATIENT INSTRUCTIONS
"  Thank you for choosing us for your care, sounds like a really rough \"vacation\"!    I recommend stopping your antibiotics. I think you've taken enough that you don't have to continue or take another one at this time, but please let me know if you're still having GI symptoms or if new ones develop.    Given everything going on, I do feel that you could certainly take Paxlovid for covid treatment, just to try to help you feel better sooner. Paxlovid can cause nausea, but should reduce risk of serious illness.  I'll prescribe it for you, if you do decide to take it, please start it as soon as possible.    If you're not feeling better within 5-7 days, please schedule an in person appointment because I'd recommend lab work and perhaps a repeat EKG. Certainly if symptoms worsen quickly, please go to ER.      You can schedule an appointment right here in [facility], or call [phone]  If the visit is for the same symptoms as your eVisit, we'll refund the cost of your eVisit if seen within seven days.      Sincerely,  Dr. Noelle Lundy MD  3/16/2023    COVID-19 Outpatient Treatments  Your care team can help you find the best treatments for COVID-19. Talk to a health care provider or refer to the FDA medicine fact sheets below.    Important: You can't have Paxlovid or molnupiravir if you're starting the medicine more than 5 days after your symptoms have started.  Paxlovid: https://www.fda.gov/media/779425/download  Molnupiravir (Lagevrio): https://www.fda.gov/media/481228/download  Paxlovid (nimatrelvir and ritonavir)  How it works  Two medicines (nirmatrelvir and ritonavir) are taken together. They stop the virus from growing. Less amount of virus is easier for your body to fight.  Benefits  Lowers risk of a hospital stay or death from COVID-19.  How to take    Medicine comes in a daily container with both medicine tablets. Take by mouth twice daily (once in the morning, once at night) for 5 days.    The number of " tablets to take varies by patient.    Don't chew or break capsules. Swallow whole.  When to take  Take as soon as possible after positive COVID-19 test result, and within 5 days of your first symptoms.  Who can take it  Patients must be 12 years or older, weigh at least 88 pounds, and have tested positive for COVID-19. Paxlovid is the preferred treatment for pregnant patients.  Possible side effects  Can cause altered sense of taste, diarrhea (loose, watery stools), high blood pressure, muscle aches.  Medicine conflicts    Some medicines may conflict with Paxlovid and may cause serious side effects.    Tell your care team about all the medicines you take, including prescription and over-the-counter medicines, vitamins, and herbal supplements.    Your care team will review your medicines to make sure that you can safely take Paxlovid.  Cautions    Paxlovid is not advised for patients with severe kidney or liver disease. If you have kidney or liver problems, the dose may need to be changed.    If you're pregnant or breastfeeding, talk to your care team about your options.    If you take hormonal birth control (such as the Pill), then you or your partner should also use a non-hormonal form of birth control (such as a condom). Keep doing this for 1 menstrual cycle after your last dose of Paxlovid.  Molnupiravir (Lagevrio)  How it works  Stops the virus from growing. Less amount of virus is easier for your body to fight.  Benefits  Lowers risk of a hospital stay or death from COVID-19.  How to take  Take 4 capsules by mouth every 12 hours (4 in the morning and 4 at night) for 5 days. Don't chew or break capsules. Swallow whole.  When to take  Take as soon as possible after positive COVID-19 test result, and within 5 days of your first symptoms.  Who can take it  Patients must be 18 years or older and have tested positive for COVID-19.  Possible side effects  Diarrhea (loose, watery stools), nausea (feeling sick to your  stomach), dizziness, headaches.  Medicine conflicts  Right now, there are no known conflicts with other drugs. But tell your care team about all medicines you take.  Cautions    This medicine is not advised for patients who are pregnant.    If you are someone who could become pregnant, use trusted birth control until 4 days after your last dose of molnupiravir.    If your partner could become pregnant, you should use trusted birth control until 3 months after your last dose of molnupiravir.  For informational purposes only. Not to replace the advice of your health care provider. Copyright   2022 [employer] [facility]. All rights reserved. Clinically reviewed by Sabrina Conway, PharmD, BCACP. [employer] [id number] - REV 12/22.

## 2023-03-21 ENCOUNTER — OFFICE VISIT (OUTPATIENT)
Dept: INTERNAL MEDICINE | Facility: CLINIC | Age: 43
End: 2023-03-21
Payer: COMMERCIAL

## 2023-03-21 VITALS
HEART RATE: 84 BPM | DIASTOLIC BLOOD PRESSURE: 60 MMHG | OXYGEN SATURATION: 100 % | BODY MASS INDEX: 24.72 KG/M2 | HEIGHT: 64 IN | SYSTOLIC BLOOD PRESSURE: 106 MMHG | WEIGHT: 144.8 LBS | TEMPERATURE: 98.7 F

## 2023-03-21 DIAGNOSIS — U07.1 INFECTION DUE TO 2019 NOVEL CORONAVIRUS: ICD-10-CM

## 2023-03-21 DIAGNOSIS — I49.3 PVC'S (PREMATURE VENTRICULAR CONTRACTIONS): ICD-10-CM

## 2023-03-21 DIAGNOSIS — A05.9 FOOD POISONING: Primary | ICD-10-CM

## 2023-03-21 DIAGNOSIS — Z88.1 DRUG ALLERGY, ANTIBIOTIC: ICD-10-CM

## 2023-03-21 PROCEDURE — 99213 OFFICE O/P EST LOW 20 MIN: CPT | Mod: CS | Performed by: INTERNAL MEDICINE

## 2023-03-21 ASSESSMENT — PAIN SCALES - GENERAL: PAINLEVEL: NO PAIN (0)

## 2023-03-21 NOTE — PROGRESS NOTES
"  Assessment & Plan     (A05.9) Food poisoning  (primary encounter diagnosis)  Comment:   intially probably just had food poisoning but on the acute appearnce of the ysmptoms as she was arrivign in [facility].   Plan:     (U07.1) Infection due to 2019 novel coronavirus  Comment: developed the infeciton as she was there.   Resolving.   Prolonged convalescence from viral infections are very common with potentially numerous generalized symptoms due to the systemic nature of both infection.    The typical recovery course from most common viral URIs and post viral syndrome with the most acute symptoms usually over in 5-7 days, but then a lingering convalescence phase with lingering symptoms such as fatigue, malaise, decreased appetite, mild joint or muscle aching, etc.    The timeframe for recovery from COVID-19 symptoms can last far longer than typical viral syndromes.  Symptoms associated with the \"post Covid 19 syndrome\" can include decreased appetite, shortness of breath, mood changes, palpitations, increased anxiety, insomnia, nonspecific fatigue, and \"brain fog\".    Unfortunately, there is no specific treatment for this post Covid syndrome other than supportive cares, getting adequate sleep, adequate nutrition, and adequate hydration.  May return to activities as tolerated and prioritize their activities accordingly.    Return for any worsening or changing of symptoms or if their symptoms are interfering with their activities of daily living to any significant degree.    Plan:     (I49.3) PVC's (premature ventricular contractions)  Comment: extensive workup at [facility] showed nothing more than PVCs, which would not be surprising given her illness and gatroenteritis.   Plan:     (Z88.1) Drug allergy, antibiotic  Comment: developed allergic reaciotn to the ceurozime that she got.   Added to drug allergy list.   Plan:               MED REC REQUIRED  Post Medication Reconciliation Status:       BMI:   Estimated body " "mass index is 25.25 kg/m  as calculated from the following:    Height as of this encounter: 1.613 m (5' 3.5\").    Weight as of this encounter: 65.7 kg (144 lb 12.8 oz).           Pelon Joseph MD  [facility] STACEY Gruber is a 42 year old, presenting for the following health issues:  Hospital F/U      HPI     Hospital follow up-  Went to hospital in [facility] after fainting and vomiting. Doctors said I had heart arrhythmia exacerbated by gastric infection. Need follow up. Also got COVID 3/13 but already feeling better.  Feeling fatigued following COVID    Developed acute nasuea and vomoting as she was arriving in [facility]   rememebrs possibly eating food that tasted funny as she was leaving.   Upon arrivla to [facility] felt dizzy, fainted.   Went to ER in [facility] kept for 48 hours and had many tests.   She brought alrge packet of hospital records, which we reviewed in deatil today.     Reviewed the results she brought with her (sent for abstrction into her chart)  Labs were unremkarble.   holter monitoring negative for any significant abnormal arrhythmia, just eh PVCs.   CXR normal.   ECHO normal.       Given Cefixime by hospital staff in case this was bacterial enteritis, but developed rash with the cefixime.     symptosm all resovled promptly.       **I reviewed the information recorded in the patient's EPIC chart (including but not limited to medical history, surgical history, family history, problem list, medication list, and allergy list) and updated the information as indicated based on the patients reported information.         Review of Systems   Constitutional, HEENT, cardiovascular, pulmonary, gi and gu systems are negative, except as otherwise noted.      Objective    /60   Pulse 84   Temp 98.7  F (37.1  C) (Oral)   Ht 1.613 m (5' 3.5\")   Wt 65.7 kg (144 lb 12.8 oz)   LMP  (LMP Unknown)   SpO2 100%   Breastfeeding No   BMI 25.25 kg/m    Body " mass index is 25.25 kg/m .  Physical Exam   GENERAL alert and no distress  EYES:  Normal sclera,conjunctiva, EOMI  HENT: oral and posterior pharynx without lesions or erythema, facies symmetric  NECK: Neck supple. No LAD, without thyroidmegaly.  RESP: Clear to ausculation bilaterally without wheezes or crackles. Normal BS in all fields.  CV: RRR normal S1S2 without murmurs, rubs or gallops.  LYMPH: no cervical lymph adenopathy appreciated  MS: extremities- no gross deformities of the visible extremities noted,   EXT:  no lower extremity edema  PSYCH: Alert and oriented times 3; speech- coherent  SKIN:  No obvious significant skin lesions on visible portions of face

## 2023-03-21 NOTE — PATIENT INSTRUCTIONS
" You most likely had food poisoning as the initial event.      Due to the mild arrhythmia ( lots of premature beats (PVCs) and a minor abnormality on the original EKG, they needed to do the stress test to make sure no active coronary artery disease.       The 2 days of rhythm showed the premature beats, but no other significant abnormalities.   These premature beats occur with any sort of acute infection, inflammation, etc.      The remainder of the labs were normal.   You probably did not need the antibiotics that they gave you (they likely thought that you had a bacterial intestinal infection ( traveller's diarrhea) which is very common.        Covid infection was probably a separate, and unfortunately unlucky event.     No further treatment or evaluation required at this time as long as you continue to improve.      Probable drug allergy to cefixime, the antibiotic they gave you. This is relative of penicillin to which you are allergic.      contact your primary MD if you have any concerning symptoms.         Post Covid Syndrome:     Prolonged convalescence from viral infections are very common with potentially numerous generalized symptoms due to the systemic nature of both infection.    The typical recovery course from most common viral URIs and post viral syndrome with the most acute symptoms usually over in 5-7 days, but then a lingering convalescence phase with lingering symptoms such as fatigue, malaise, decreased appetite, mild joint or muscle aching, etc.    The timeframe for recovery from COVID-19 symptoms can last far longer than typical viral syndromes.  Symptoms associated with the \"post Covid 19 syndrome\" can include decreased appetite, shortness of breath, mood changes, palpitations, increased anxiety, insomnia, nonspecific fatigue, and \"brain fog\".    Unfortunately, there is no specific treatment for this post Covid syndrome other than supportive cares, getting adequate sleep, adequate nutrition, " and adequate hydration.  Understanding that it may take more time to recover after COVID-19 infections is important  May return to activities as tolerated and prioritize their activities accordingly.    Return for any worsening or changing of symptoms or if their symptoms are interfering with their activities of daily living to any significant degree.     *  Your Covid test can remain positive for up to 90 days afterwards due to retained dead virus particles in the nasal passages.  You should not have any Covid testing during this time because your test may be positive, which would cause you troubles.  If you require any Covid testing during this time, you can meet the requirements with a copy of the positive test results and a letter from us declaring that you had an infection that has resolved.  Schedule a virtual visit appointment ( telephone or video) should you need us to provide any such documentation.

## 2023-04-08 ENCOUNTER — E-VISIT (OUTPATIENT)
Dept: FAMILY MEDICINE | Facility: CLINIC | Age: 43
End: 2023-04-08
Payer: COMMERCIAL

## 2023-04-08 DIAGNOSIS — H66.90 RECURRENT ACUTE OTITIS MEDIA: Primary | ICD-10-CM

## 2023-04-08 PROCEDURE — 99421 OL DIG E/M SVC 5-10 MIN: CPT | Performed by: FAMILY MEDICINE

## 2023-04-11 RX ORDER — SULFAMETHOXAZOLE/TRIMETHOPRIM 800-160 MG
1 TABLET ORAL
COMMUNITY
Start: 2023-04-07 | End: 2023-04-18

## 2023-04-11 RX ORDER — FLUTICASONE PROPIONATE 50 MCG
SPRAY, SUSPENSION (ML) NASAL
COMMUNITY
Start: 2023-04-07 | End: 2023-10-09

## 2023-04-11 NOTE — PATIENT INSTRUCTIONS
HI, it looks like it's time to see an Ear, Nose and Throat specialist. It is highly unusual for adult to get ear infections, and you've had them repeatedly   I will go ahead and put in a referral and someone will call to help you schedule an appointment. Unfortunately, the soonest appointment  is likely to be June or July.    If your ear drum is ruptured, that's actually going to feel better on a flight. A congested, infected ear with an intact ear drum could be quite painful and ascent and descent while flying. If you're having any symptoms of congestion, I recommend taking decongestants, but especially if you're flying.    Decongestants include pseudoephedrine (the real one, available without a prescription, but you do have to ask the pharmacist for it), steroid nasal sprays, Afrin for 3 days, and anti allergy medications if allergies are contributing to the symptoms.    I also recommend daily or twice daily nasal lavage (also known as nasal irrigation, or Netipot). You can find over-the-counter products, such as Neti-Pot, at retail locations or make your own at home. Instructions for homemade nasal lavage and more information on the process are available online at http://www.aafp.org/afp/2009/1115/p1121.html, and you can google Youtube videos for Netipot techniques.    Hang in there!    Sincerely,  Dr. Noelle Lundy MD  4/11/2023      Dear Allyn Mcqueen    After reviewing your responses, I've been able to diagnose you with Recurrent acute otitis media.        If your symptoms worsen, you develop severe headache, vomiting, high fever (>102), or are not improving in 7 days, please schedule an in ffice appointment or visit any of our convenient Walk-in Care or Urgent Care Centers to be seen which can be found on our website?here.?     Thanks again for choosing?us?as your health care partner,?   ?  Noelle Lundy MD?

## 2023-04-18 ENCOUNTER — VIRTUAL VISIT (OUTPATIENT)
Dept: FAMILY MEDICINE | Facility: CLINIC | Age: 43
End: 2023-04-18
Payer: COMMERCIAL

## 2023-04-18 DIAGNOSIS — H92.01 MASTOID PAIN, RIGHT: Primary | ICD-10-CM

## 2023-04-18 PROCEDURE — 99214 OFFICE O/P EST MOD 30 MIN: CPT | Mod: VID | Performed by: FAMILY MEDICINE

## 2023-04-18 NOTE — PATIENT INSTRUCTIONS
Please call [facility] at [phone] to schedule your MRI of the skull for mastoid pain.    Locations:   [facility], [facility] [address] [address]

## 2023-04-18 NOTE — PROGRESS NOTES
Allyn is a 42 year old who is being evaluated via a billable video visit.      How would you like to obtain your AVS? MyChart  If the video visit is dropped, the invitation should be resent by: Text to cell phone: [phone]  Will anyone else be joining your video visit? No          Assessment & Plan     (H92.01) Mastoid pain, right  (primary encounter diagnosis)  Comment: persistent after acute OM with ruptured  Plan: MR Skull Base wo & w Contrast        New, previously undiagnosed problem with uncertain prognosis and additional work-up planned.   Will evaluate for abscess or other abnormality of ear/skull  Will fu as indicated.   Follow up with ENT as scheduled    Noelle Lundy MD  [facility]   Allyn is a 42 year old, presenting for the following health issues:  Ear issues  She does have a scheduled ENT appointment at [facility] coming up to review ear symptoms, had ear infection after camping in [place] developed severe symptoms overnight with ear drainage of bloody fluid discharge, extreme dizziness. She did see an ENT and received oral antibiotics and ear drops.    She has ongoing severe pain behind her right ear, associated with pain/feeling of fullness, going slight discharge, but much reduced, and only very mild dizziness. She is taking 600 mg ibuprofen scheduled every 4-6 hours.    Follow Up (F/U )        4/18/2023    10:33 AM   Additional Questions   Roomed by Ludmila Weinberg     History of Present Illness       Reason for visit:  Follow up ear and heart    She eats 2-3 servings of fruits and vegetables daily.She consumes 1 sweetened beverage(s) daily.She exercises with enough effort to increase her heart rate 30 to 60 minutes per day.  She exercises with enough effort to increase her heart rate 5 days per week.   She is taking medications regularly.         Review of Systems   Constitutional, HEENT, cardiovascular, pulmonary, gi and gu systems are  negative, except as otherwise noted.      Objective           Vitals:  No vitals were obtained today due to virtual visit.    Physical Exam   GENERAL: Healthy, alert and no distress  EYES: Eyes grossly normal to inspection.  No discharge or erythema, or obvious scleral/conjunctival abnormalities.  RESP: No audible wheeze, cough, or visible cyanosis.  No visible retractions or increased work of breathing.    SKIN: Visible skin clear. No significant rash, abnormal pigmentation or lesions.  NEURO: Cranial nerves grossly intact.  Mentation and speech appropriate for age.  PSYCH: Mentation appears normal, affect normal/bright, judgement and insight intact, normal speech and appearance well-groomed.            Video-Visit Details    Type of service:  Video Visit     Originating Location (pt. Location): Home  Distant Location (provider location):  Off-site  Platform used for Video Visit: Vaughn Burton

## 2023-04-19 ENCOUNTER — ANCILLARY PROCEDURE (OUTPATIENT)
Dept: MRI IMAGING | Facility: CLINIC | Age: 43
End: 2023-04-19
Attending: FAMILY MEDICINE
Payer: COMMERCIAL

## 2023-04-19 DIAGNOSIS — H92.01 MASTOID PAIN, RIGHT: ICD-10-CM

## 2023-04-19 PROCEDURE — A9585 GADOBUTROL INJECTION: HCPCS | Performed by: STUDENT IN AN ORGANIZED HEALTH CARE EDUCATION/TRAINING PROGRAM

## 2023-04-19 PROCEDURE — 70553 MRI BRAIN STEM W/O & W/DYE: CPT | Mod: GC | Performed by: STUDENT IN AN ORGANIZED HEALTH CARE EDUCATION/TRAINING PROGRAM

## 2023-04-19 RX ORDER — GADOBUTROL 604.72 MG/ML
7.5 INJECTION INTRAVENOUS ONCE
Status: COMPLETED | OUTPATIENT
Start: 2023-04-19 | End: 2023-04-19

## 2023-04-19 RX ADMIN — GADOBUTROL 6.5 ML: 604.72 INJECTION INTRAVENOUS at 17:38

## 2023-04-20 ENCOUNTER — TELEPHONE (OUTPATIENT)
Dept: FAMILY MEDICINE | Facility: CLINIC | Age: 43
End: 2023-04-20
Payer: COMMERCIAL

## 2023-04-20 ENCOUNTER — MYC MEDICAL ADVICE (OUTPATIENT)
Dept: FAMILY MEDICINE | Facility: CLINIC | Age: 43
End: 2023-04-20
Payer: COMMERCIAL

## 2023-04-20 PROBLEM — H70.001 MASTOIDITIS, ACUTE, RIGHT: Status: ACTIVE | Noted: 2023-04-20

## 2023-04-20 LAB — RADIOLOGIST FLAGS: ABNORMAL

## 2023-04-20 NOTE — TELEPHONE ENCOUNTER
See telephone encounter/result message, follow up with ENT as scheduled and go to ER for IV medication.  Sincerely,  Dr. Noelle Lundy MD  4/20/2023

## 2023-04-20 NOTE — RESULT ENCOUNTER NOTE
I called patient on her mobile to discuss MR result with new diagnosis of osteomastoiditis, recommendation for immediate IV abx treatment. She has an inperson appointment today with outside ENT in [place], it is reasonable to go to this appointment as they may be able to facilitate treatment. Otherwise, I recommend she go to ER for evaluation and to initiate treatment.    Sincerely,  Dr. Noelle Lundy MD  4/20/2023  12:02 PM

## 2023-04-20 NOTE — TELEPHONE ENCOUNTER
Dr. Lundy --    Nikia, Imaging Outreach, called to report URGENT FINDINGS from 4/19/2023 scan. Ready to review.     Marilee Garnett, BSN RN  [facility]

## 2023-05-03 ENCOUNTER — TRANSFERRED RECORDS (OUTPATIENT)
Dept: HEALTH INFORMATION MANAGEMENT | Facility: CLINIC | Age: 43
End: 2023-05-03

## 2023-06-14 NOTE — PATIENT INSTRUCTIONS
Patient Education     Treating Anxiety Disorders with Medicine  An anxiety disorder can make you feel nervous or apprehensive, even without a clear reason. In people age 65 and older, generalized anxiety disorder is one of the most commonly diagnosed anxiety disorders. Many times it occurs with depression. Certain anxiety disorders can cause intense feelings of fear or panic. You may even have physical symptoms such as a racing heartbeat, sweating, or dizziness. If you have these feelings, you don t have to suffer anymore. Treatment to help you overcome your fears will likely include therapy (also called counseling). Medicine may also be prescribed to help control your symptoms.     Medicines  Certain medicines may be prescribed to help control your symptoms. So you may feel less anxious. You may also feel able to move forward with therapy. At first, medicines and dosages may need to be adjusted to find what works best for you. Try to be patient. Tell your healthcare provider how a medicine makes you feel. This way, you can work together to find the treatment that s best for you. Keep in mind that medicines can have side effects. Talk with your provider about any side effects that are bothering you. Changing the dose or type of medicine may help. Don t stop taking medicine on your own. That can cause symptoms to come back or cause dangerous withdrawal symptoms.     Anti-anxiety medicine. This medicine eases symptoms and helps you relax. Your healthcare provider will explain when and how to use it. It may be prescribed for use before situations that make you anxious. You may also be told to take medicine on a regular schedule. Anti-anxiety medicine may make you feel a little sleepy or  out of it.  Don t drive a car or operate machinery while on this medicine, until you know how it affects you.  Never use alcohol or other drugs with anti-anxiety medicines. This could result in loss of muscular control, sedation,  coma, or death. Also, use only the amount of medicine prescribed for you. If you think you may have taken too much, get emergency care right away. Never share your medicines with others. Store these medicines in a safe place that can't be reached by children or visitors.   Keep taking medicines as prescribed  Never change your dosage, share or use another person's medicine, or stop taking your medicines without talking to your healthcare provider first. Keep the following in mind:     Some medicines must be taken on a schedule. Make this part of your daily routine. For instance, always take your pill before brushing your teeth. A pillbox can help you remember if you ve taken your medicine each day.    Medicines are often taken for 6 to 12 months. Your healthcare provider will then evaluate whether you need to stay on them. Many people who have also had therapy may no longer need medicine to manage anxiety.    You may need to stop taking medicine slowly to give your body time to adjust. When it s time to stop, your healthcare provider will tell you more. Remember: Never stop taking your medicine without talking to your provider first.    If symptoms return, you may need to start taking medicines again.  This isn t your fault. It s just the nature of your anxiety disorder.  What to think about    Side effects. Medicines may cause side effects. Ask your healthcare provider or pharmacist what you can expect. They may have ideas for avoiding some side effects.    Sexual problems. Some antidepressants can affect your desire for sex or your ability to have an orgasm. A change in dosage or medicine often solves the problem. If you have a sexual side effect that concerns you, tell your healthcare provider.    Addiction. If you ve never had a problem with drugs or alcohol, you may not have a problem with medicines used to treat anxiety disorders. But always discuss the medicines with your healthcare provider before taking  them. If you have a history of addiction, you may not be able to use certain medicines used to treat anxiety disorders.    Medicine interactions. Always check with your pharmacist before using any over-the-counter medicines (OTCs), including herbal supplements. Some OTCs may interact with your anti-anxiety medicines and increase or decrease their effectiveness.    Jorge last reviewed this educational content on 12/1/2019 2000-2021 [employer]. All rights reserved. This information is not intended as a substitute for professional medical care. Always follow your healthcare professional's instructions.           Patient Education     Anxiety Reaction  Anxiety is the feeling we all get when we think something bad might happen. It is a normal response to stress and normally causes only a mild reaction. When anxiety becomes more severe, it can interfere with daily life. In some cases, you may not even be aware of what you re anxious about. There may also be a genetic link. Or it may be a learned behavior in the home.   Both psychological and physical triggers cause stress reaction. It's often a response to fear or emotional stress, real or imagined. This stress may come from home, family, work, or social relationships.   During an anxiety reaction, you may feel:    Helpless    Nervous    Depressed    Grouchy  Your body may show signs of anxiety in many ways. You may experience:    Dry mouth    Shakiness    Dizziness    Weakness    Trouble breathing    Breathing fast (hyperventilating)    Chest pressure    Sweating    Headache    Nausea    Diarrhea    Tiredness    Inability to sleep    Sexual problems  Home care    Try to find the sources of stress in your life. They may not be obvious. These may include:  ? Daily hassles of life (such as traffic jams, missed appointments, or car troubles)  ? Major life changes, both good (new baby or job promotion) and bad (loss of job or loss of loved  one)  ? Overload (feeling that you have too many responsibilities and can't take care of all of them at once)  ? Feeling helpless or feeling that your problems can't be solved    Notice how your body reacts to stress. Learn to listen to your body signals. This will help you take action before the stress becomes severe.    When you can, do something about the source of your stress. (Avoid hassles, limit the amount of change that happens in your life at one time, and take a break when you feel overloaded).    Unfortunately, many stressful situations can't be avoided. It is necessary to learn how to better manage stress. There are many proven methods that will reduce your anxiety. These include simple things such as exercise, good nutrition, and adequate rest. Also, there are certain techniques that are helpful:  ? Relaxation  ? Breathing exercises  ? Visualization  ? Biofeedback  ? Meditation  For more information about this, talk with your healthcare provider. Or check online or at your local library or bookstore. You'll find many books and audiobooks on this subject.   Follow-up care  If you feel your anxiety is not responding to self-help measures, call your healthcare provider or make an appointment with a counselor. You may need short-term psychological counseling or medicine to help you manage stress.   Call 911  Call 911 if any of these happen:     Trouble breathing    Confusion    Drowsiness or trouble waking up    Fainting or loss of consciousness    Rapid heart rate    Seizure    New chest pain that becomes more severe, lasts longer, or spreads into your shoulder, arm, neck, jaw, or back  When to get medical advice  Call your healthcare provider right away if any of these happen:    Your symptoms get worse    Severe headache not eased by rest and mild pain reliever  [employer] last reviewed this educational content on 4/1/2020 2000-2021 [employer]. All rights reserved. This information is not  intended as a substitute for professional medical care. Always follow your healthcare professional's instructions.            Doxepin Pregnancy And Lactation Text: This medication is Pregnancy Category C and it isn't known if it is safe during pregnancy. It is also excreted in breast milk and breast feeding isn't recommended.

## 2023-06-21 ENCOUNTER — TRANSFERRED RECORDS (OUTPATIENT)
Dept: HEALTH INFORMATION MANAGEMENT | Facility: CLINIC | Age: 43
End: 2023-06-21
Payer: COMMERCIAL

## 2023-06-28 NOTE — TELEPHONE ENCOUNTER
FUTURE VISIT INFORMATION:      FUTURE VISIT INFORMATION:    Date: 9/19/23    Time: 8:45 am    Location: [facility]  REFERRAL INFORMATION:    Referring provider: Noelle Lundy MD    Referring providers clinic: [facility]    Reason for visit/diagnosis:  per patient recurrent ear infections. referral from Irvin Lundy MD.confirmed [facility]    RECORDS REQUESTED FROM:       Clinic name Comments Records Status Imaging Status   [facility] 4/8/23 E-visit with Noelle Lundy MD  4/18/23 OV note [facility] Imaging MR skull base 4/19/23 [facility] Pacs   [facility] 12/2022 - 6/2023 OV note  5/3/23 audiogram  2/15/22 audiogram Send to scan 6/30/23 June 30, 2023 at 8:57 AM - Received recs from [facility] and send to scanning -Brenda

## 2023-08-18 DIAGNOSIS — Z01.10 ENCOUNTER FOR HEARING TEST: Primary | ICD-10-CM

## 2023-09-19 ENCOUNTER — OFFICE VISIT (OUTPATIENT)
Dept: AUDIOLOGY | Facility: CLINIC | Age: 43
End: 2023-09-19
Payer: COMMERCIAL

## 2023-09-19 ENCOUNTER — PRE VISIT (OUTPATIENT)
Dept: OTOLARYNGOLOGY | Facility: CLINIC | Age: 43
End: 2023-09-19

## 2023-09-19 ENCOUNTER — OFFICE VISIT (OUTPATIENT)
Dept: OTOLARYNGOLOGY | Facility: CLINIC | Age: 43
End: 2023-09-19
Payer: COMMERCIAL

## 2023-09-19 VITALS
TEMPERATURE: 98.7 F | DIASTOLIC BLOOD PRESSURE: 81 MMHG | WEIGHT: 151 LBS | HEART RATE: 71 BPM | SYSTOLIC BLOOD PRESSURE: 125 MMHG | OXYGEN SATURATION: 100 % | HEIGHT: 63 IN | BODY MASS INDEX: 26.75 KG/M2

## 2023-09-19 DIAGNOSIS — J32.0 CHRONIC SINUSITIS OF BOTH MAXILLARY SINUSES: ICD-10-CM

## 2023-09-19 DIAGNOSIS — H93.8X1 EAR PRESSURE, RIGHT: ICD-10-CM

## 2023-09-19 DIAGNOSIS — H90.41 SENSORINEURAL HEARING LOSS (SNHL) OF RIGHT EAR WITH UNRESTRICTED HEARING OF LEFT EAR: Primary | ICD-10-CM

## 2023-09-19 DIAGNOSIS — H61.23 EXCESSIVE CERUMEN IN BOTH EAR CANALS: ICD-10-CM

## 2023-09-19 DIAGNOSIS — H69.93 CHRONIC EUSTACHIAN TUBE DYSFUNCTION, BILATERAL: Primary | ICD-10-CM

## 2023-09-19 DIAGNOSIS — H65.119: ICD-10-CM

## 2023-09-19 PROCEDURE — 92504 EAR MICROSCOPY EXAMINATION: CPT | Performed by: OTOLARYNGOLOGY

## 2023-09-19 PROCEDURE — 99203 OFFICE O/P NEW LOW 30 MIN: CPT | Mod: 25 | Performed by: OTOLARYNGOLOGY

## 2023-09-19 PROCEDURE — 92550 TYMPANOMETRY & REFLEX THRESH: CPT | Performed by: AUDIOLOGIST

## 2023-09-19 PROCEDURE — 92565 STENGER TEST PURE TONE: CPT | Performed by: AUDIOLOGIST

## 2023-09-19 PROCEDURE — 92557 COMPREHENSIVE HEARING TEST: CPT | Performed by: AUDIOLOGIST

## 2023-09-19 RX ORDER — SULFAMETHOXAZOLE/TRIMETHOPRIM 800-160 MG
1 TABLET ORAL 2 TIMES DAILY
Qty: 28 TABLET | Refills: 0 | Status: SHIPPED | OUTPATIENT
Start: 2023-09-19 | End: 2023-10-03

## 2023-09-19 RX ORDER — FLUTICASONE PROPIONATE 50 MCG
2 SPRAY, SUSPENSION (ML) NASAL 2 TIMES DAILY
Qty: 18.2 ML | Refills: 8 | Status: SHIPPED | OUTPATIENT
Start: 2023-09-19

## 2023-09-19 ASSESSMENT — PAIN SCALES - GENERAL: PAINLEVEL: MILD PAIN (2)

## 2023-09-19 NOTE — NURSING NOTE
"Chief Complaint   Patient presents with    Consult     Recurrent ear infection     Blood pressure 125/81, pulse 71, temperature 98.7  F (37.1  C), height 1.6 m (5' 3\"), weight 68.5 kg (151 lb), SpO2 100 %, not currently breastfeeding.Marlon Cyr LPN    "

## 2023-09-19 NOTE — PROGRESS NOTES
AUDIOLOGY REPORT    SUMMARY: Audiology visit completed. See audiogram for results.      RECOMMENDATIONS: Follow-up with ENT.    Marcus Fung.  Licensed Audiologist  [place] # [id number]

## 2023-09-19 NOTE — PROGRESS NOTES
Dear Noelle Oro:    I had the pleasure of meeting Allyn Mcqueen in consultation today at the [facility] at your request.    CHIEF COMPLAINT: Right ear pressure, hearing loss    HISTORY OF PRESENT ILLNESS: Patient is a 42-year-old in today for consultation on her ears, specifically the right ear and referred from her family physician.  She has had problems with her ears, especially the right side since April.  She flew to [place] at that time and had a cold.  While in [place] she developed right ear pain and actually drainage.  She was told the ear ruptured and she had bloody discharge.  She delayed her flight back and on the flight back years seemed okay.  She was told by ENT in [place] that she had ruptured the eardrum.  Again the flight back was okay.  She did have an MRI with and without contrast in April.  Report was negative, I cannot find that scan today but there is no retrocochlear pathology by report.  She did see ENT here, Dr. Anna with the ENT specialty care.  She was placed on oral steroids and antibiotics and here seem to get better.  Still had a lot of pressure and they did try to place a tube, a myringotomy was made and fluid was removed but apparently they are unable to place a tube.  Their note says if symptoms return they would place a tube under anesthesia.  On her follow-up today, the ear is much better but still some pressure.  She can tell the hearing slightly down on the right side but improved.  She had a lot of congestion lately with her sinuses and ear symptoms seem to get worse would agree with that.  She has been on Flonase in the past but has never used that regularly.  She has not had any oral antibiotics since April.    ALLERGIES:    Allergies   Allergen Reactions    Adhesive Tape Rash    Amoxicillin Rash    Cefixime Rash    Latex Rash       HABITS: Social History    Substance and Sexual Activity      Alcohol use: Yes        Comment:  occ     History   Smoking Status    Never   Smokeless Tobacco    Never         PAST MEDICAL HISTORY: Please see today's intake form (for the remainder of the PMH) which I reviewed and signed.  Past Medical History:   Diagnosis Date    Anxiety     worse post-partum    Esophageal reflux 2018    started during pregnancy; got worse year later    Ovarian cyst rupture 04/2010    Palpitations     off and on for years; more off now    Recurrent otitis media     Stomach problems 2018    started post-pregnancy in 2018       FAMILY HISTORY/SOCIAL HISTORY:   Family History   Problem Relation Age of Onset    Respiratory Mother         COPD    Psychotic Disorder Mother         Bi polar    Mental Illness Mother         diagnosed bipolar; depression    Lipids Father     Heart Disease Maternal Grandmother     Cerebrovascular Disease Maternal Grandmother     Cancer Paternal Aunt         breast 60s      Social History     Socioeconomic History    Marital status:      Spouse name: Not on file    Number of children: Not on file    Years of education: Not on file    Highest education level: Not on file   Occupational History     Employer: [employer]     Comment: organizer [employer]   Tobacco Use    Smoking status: Never    Smokeless tobacco: Never   Vaping Use    Vaping Use: Never used   Substance and Sexual Activity    Alcohol use: Yes     Comment: occ    Drug use: No    Sexual activity: Yes     Partners: Male     Birth control/protection: I.U.D.   Other Topics Concern    Parent/sibling w/ CABG, MI or angioplasty before 65F 55M? No   Social History Narrative    Caffeine intake/servings daily - 0    Calcium intake/servings daily - 3    Exercise 3 times weekly - describe yoga, runs, bikes    Sunscreen used - Yes    Seatbelts used - Yes    Guns stored in the home - Yes    Self Breast Exam - Yes    Pap test up to date -  Yes    Eye exam up to date -  Yes    Dental exam up to date -  Yes    DEXA scan up to date -  No    Flex  Sig/Colonoscopy up to date -  No    Mammography up to date -  No    Immunizations reviewed and up to date - Yes    Abuse: Current or Past (Physical, Sexual or Emotional) - No    Do you feel safe in your environment - Yes    Do you cope well with stress - Yes    Do you suffer from insomnia - No    Last updated by: Betzaida Mckeon  12/2/2014         Social Determinants of Health     Financial Resource Strain: Not on file   Food Insecurity: Not on file   Transportation Needs: Not on file   Physical Activity: Not on file   Stress: Not on file   Social Connections: Not on file   Intimate Partner Violence: Not on file   Housing Stability: Not on file       REVIEW OF SYSTEMS: Patient Supplied Answers to Review of Systems      9/19/2023     7:08 AM    ENT ROS   Neurology Dizzy spells   Ears, Nose, Throat Hearing loss    Ear pain            The remainder of the 10 point ROS is negative    PHYSICIAL EXAMINATION:  Constitutional: The patient was well-groomed and in no acute distress.   Skin: Warm and pink.  Psychiatric: The patient's affect was calm, cooperative, and appropriate.   Respiratory: Breathing comfortably without stridor or exertion of accessory muscles.  Eyes: Pupils were equal and reactive. Extraocular movement intact.   Head: Normocephalic and atraumatic. No lesions or scars.  Ears: Patient placed under the microscope for microscopic evaluation and cleaning of cerumen which was obscuring full visualization and complete assessment of both TMs. Under high power magnification, the right ear was examined and cleaned of cerumen using instruments.  After cleaning, TM is fully visualized and has normal position with normal middle ear aeration. The left ear was then cleaned and inspected using microscope, instruments and similar techniques. After cleaning of cerumen, the TM has normal position with normal aeration to middle ear.  Specifically with a forced Valsalva she is able to pop the right ear.  Nose:  Sinuses were nontender. Anterior rhinoscopy revealed midline septum and absence of purulence or polyps.  Oral Cavity: Normal tongue, floor of mouth, buccal mucosa, and palate. No lesions or masses on inspection or palpation. No abnormal lymph tissue in the oropharynx.   Neck: The parotid is soft without masses. Supple with normal laryngeal and tracheal landmarks.   Lymphatic: There is no palpable lymphadenopathy or other masses in the neck.   Neurologic: Alert and oriented x 3. Cranial nerves III-XI within normal limits. Voice quality normal.  Cerebellar Function Tests:  Grossly normal    Audiogram: Audiogram performed shows normal hearing in the left ear, she has a mild right conductive hearing loss through the speech frequencies and a higher conductive loss above 3000 Hz.  She is 100% discrimination levels bilaterally.  Left side shows normal tympanograms she has a flattened type a tympanogram on the right.      IMPRESSION AND PLAN:   Right eustachian tube dysfunction: Lingering symptoms since April and a severe infection.  I do not see any persistent right tympanic membrane perforation.  She has had sinus issues recently and I think that may be contributing to symptoms in the right ear.  We discussed this in detail.  I am going to put her on Bactrim DS twice a day for 2 weeks.  Question of allergy to Augmentin.  Also get her on Flonase and she is never used that regularly and we discussed the importance of using it on a regular basis, frequent Valsalvas.  I am going to follow-up in 2 months to see if this is cleared and discussed option for tube placement.  I think it would be difficult to place this in the clinic as she got lightheaded just cleaning the ear today.  Right conductive hearing loss: Secondary to eustachian tube as above.  Monitor.  Treatment as above.  Right ear pressure: Feel secondary to eustachian tube dysfunction, treatment as above, regular Valsalvas as well.  Sinusitis: Symptoms of facial  pressure and nasal congestion recently, treatment with the Bactrim as above and Flonase hopefully to help with the sinus as well.    Thank you very much for the opportunity to participate in the care of your patient.    Rick L Nissen MD

## 2023-09-19 NOTE — LETTER
9/19/2023       RE: Allyn Mcqueen  [address]  [place] [address]     Dear Colleague,    Thank you for referring your patient, Allyn Mcqueen, to the [facility] [place] [facility]. Please see a copy of my visit note below.    Dear Noelle Oro:    I had the pleasure of meeting Allyn Mcqueen in consultation today at the [facility] at your request.    CHIEF COMPLAINT: Right ear pressure, hearing loss    HISTORY OF PRESENT ILLNESS: Patient is a 42-year-old in today for consultation on her ears, specifically the right ear and referred from her family physician.  She has had problems with her ears, especially the right side since April.  She flew to [place] at that time and had a cold.  While in [place] she developed right ear pain and actually drainage.  She was told the ear ruptured and she had bloody discharge.  She delayed her flight back and on the flight back years seemed okay.  She was told by ENT in [place] that she had ruptured the eardrum.  Again the flight back was okay.  She did have an MRI with and without contrast in April.  Report was negative, I cannot find that scan today but there is no retrocochlear pathology by report.  She did see ENT here, Dr. Anna with the ENT specialty care.  She was placed on oral steroids and antibiotics and here seem to get better.  Still had a lot of pressure and they did try to place a tube, a myringotomy was made and fluid was removed but apparently they are unable to place a tube.  Their note says if symptoms return they would place a tube under anesthesia.  On her follow-up today, the ear is much better but still some pressure.  She can tell the hearing slightly down on the right side but improved.  She had a lot of congestion lately with her sinuses and ear symptoms seem to get worse would agree with that.  She has been on  Flonase in the past but has never used that regularly.  She has not had any oral antibiotics since April.    ALLERGIES:    Allergies   Allergen Reactions     Adhesive Tape Rash     Amoxicillin Rash     Cefixime Rash     Latex Rash       HABITS: Social History    Substance and Sexual Activity      Alcohol use: Yes        Comment: occ     History   Smoking Status     Never   Smokeless Tobacco     Never         PAST MEDICAL HISTORY: Please see today's intake form (for the remainder of the PMH) which I reviewed and signed.  Past Medical History:   Diagnosis Date     Anxiety     worse post-partum     Esophageal reflux 2018    started during pregnancy; got worse year later     Ovarian cyst rupture 04/2010     Palpitations     off and on for years; more off now     Recurrent otitis media      Stomach problems 2018    started post-pregnancy in 2018       FAMILY HISTORY/SOCIAL HISTORY:   Family History   Problem Relation Age of Onset     Respiratory Mother         COPD     Psychotic Disorder Mother         Bi polar     Mental Illness Mother         diagnosed bipolar; depression     Lipids Father      Heart Disease Maternal Grandmother      Cerebrovascular Disease Maternal Grandmother      Cancer Paternal Aunt         breast 60s      Social History     Socioeconomic History     Marital status:      Spouse name: Not on file     Number of children: Not on file     Years of education: Not on file     Highest education level: Not on file   Occupational History     Employer: [employer]     Comment: organizer [employer]   Tobacco Use     Smoking status: Never     Smokeless tobacco: Never   Vaping Use     Vaping Use: Never used   Substance and Sexual Activity     Alcohol use: Yes     Comment: occ     Drug use: No     Sexual activity: Yes     Partners: Male     Birth control/protection: I.U.D.   Other Topics Concern     Parent/sibling w/ CABG, MI or angioplasty before 65F 55M? No   Social History Narrative    Caffeine  intake/servings daily - 0    Calcium intake/servings daily - 3    Exercise 3 times weekly - describe yoga, runs, bikes    Sunscreen used - Yes    Seatbelts used - Yes    Guns stored in the home - Yes    Self Breast Exam - Yes    Pap test up to date -  Yes    Eye exam up to date -  Yes    Dental exam up to date -  Yes    DEXA scan up to date -  No    Flex Sig/Colonoscopy up to date -  No    Mammography up to date -  No    Immunizations reviewed and up to date - Yes    Abuse: Current or Past (Physical, Sexual or Emotional) - No    Do you feel safe in your environment - Yes    Do you cope well with stress - Yes    Do you suffer from insomnia - No    Last updated by: Betzaida Mckeon  12/2/2014         Social Determinants of Health     Financial Resource Strain: Not on file   Food Insecurity: Not on file   Transportation Needs: Not on file   Physical Activity: Not on file   Stress: Not on file   Social Connections: Not on file   Intimate Partner Violence: Not on file   Housing Stability: Not on file       REVIEW OF SYSTEMS: Patient Supplied Answers to Review of Systems      9/19/2023     7:08 AM    ENT ROS   Neurology Dizzy spells   Ears, Nose, Throat Hearing loss    Ear pain            The remainder of the 10 point ROS is negative    PHYSICIAL EXAMINATION:  Constitutional: The patient was well-groomed and in no acute distress.   Skin: Warm and pink.  Psychiatric: The patient's affect was calm, cooperative, and appropriate.   Respiratory: Breathing comfortably without stridor or exertion of accessory muscles.  Eyes: Pupils were equal and reactive. Extraocular movement intact.   Head: Normocephalic and atraumatic. No lesions or scars.  Ears: Patient placed under the microscope for microscopic evaluation and cleaning of cerumen which was obscuring full visualization and complete assessment of both TMs. Under high power magnification, the right ear was examined and cleaned of cerumen using instruments.  After  cleaning, TM is fully visualized and has normal position with normal middle ear aeration. The left ear was then cleaned and inspected using microscope, instruments and similar techniques. After cleaning of cerumen, the TM has normal position with normal aeration to middle ear.  Specifically with a forced Valsalva she is able to pop the right ear.  Nose: Sinuses were nontender. Anterior rhinoscopy revealed midline septum and absence of purulence or polyps.  Oral Cavity: Normal tongue, floor of mouth, buccal mucosa, and palate. No lesions or masses on inspection or palpation. No abnormal lymph tissue in the oropharynx.   Neck: The parotid is soft without masses. Supple with normal laryngeal and tracheal landmarks.   Lymphatic: There is no palpable lymphadenopathy or other masses in the neck.   Neurologic: Alert and oriented x 3. Cranial nerves III-XI within normal limits. Voice quality normal.  Cerebellar Function Tests:  Grossly normal    Audiogram: Audiogram performed shows normal hearing in the left ear, she has a mild right conductive hearing loss through the speech frequencies and a higher conductive loss above 3000 Hz.  She is 100% discrimination levels bilaterally.  Left side shows normal tympanograms she has a flattened type a tympanogram on the right.      IMPRESSION AND PLAN:   Right eustachian tube dysfunction: Lingering symptoms since April and a severe infection.  I do not see any persistent right tympanic membrane perforation.  She has had sinus issues recently and I think that may be contributing to symptoms in the right ear.  We discussed this in detail.  I am going to put her on Bactrim DS twice a day for 2 weeks.  Question of allergy to Augmentin.  Also get her on Flonase and she is never used that regularly and we discussed the importance of using it on a regular basis, frequent Valsalvas.  I am going to follow-up in 2 months to see if this is cleared and discussed option for tube placement.  I  think it would be difficult to place this in the clinic as she got lightheaded just cleaning the ear today.  Right conductive hearing loss: Secondary to eustachian tube as above.  Monitor.  Treatment as above.  Right ear pressure: Feel secondary to eustachian tube dysfunction, treatment as above, regular Valsalvas as well.  Sinusitis: Symptoms of facial pressure and nasal congestion recently, treatment with the Bactrim as above and Flonase hopefully to help with the sinus as well.    Thank you very much for the opportunity to participate in the care of your patient.    Rick L Nissen MD        Again, thank you for allowing me to participate in the care of your patient.      Sincerely,    Rick L. Nissen, MD

## 2023-09-19 NOTE — PATIENT INSTRUCTIONS
1. You were seen in the ENT Clinic today by Dr. Nissen.  If you have any questions or concerns after your appointment, please call   - Option 1: ENT Clinic: [phone]   - Option 2: Annette (Dr.Nissen's Nurse): [phone]       Sherin (Dr. Nissen's Nurse): [phone]    2.   Plan to return to clinic on 11/14 with hearing test- tympanogram    3. Sent bactrim and flonase to the pharmacy- use as directed    How to Contact Us:  Send a [facility] message to your provider. Our team will respond to you via [facility]. Occasionally, we will need to call you to get further information.  For urgent matters (Monday-Friday), call the ENT Clinic: [phone] and speak with a call center team member - they will route your call appropriately.   If you'd like to speak directly with a nurse, please find our contact information below. We do our best to check voicemail frequently throughout the day, and will work to call you back within 1-2 days. For urgent matters, please use the general clinic phone numbers listed above.      Annette CODY LPN  [facility] - Otolaryngology

## 2023-10-09 ENCOUNTER — OFFICE VISIT (OUTPATIENT)
Dept: FAMILY MEDICINE | Facility: CLINIC | Age: 43
End: 2023-10-09
Payer: COMMERCIAL

## 2023-10-09 ENCOUNTER — ANCILLARY PROCEDURE (OUTPATIENT)
Dept: GENERAL RADIOLOGY | Facility: CLINIC | Age: 43
End: 2023-10-09
Attending: PHYSICIAN ASSISTANT
Payer: COMMERCIAL

## 2023-10-09 VITALS
HEIGHT: 64 IN | DIASTOLIC BLOOD PRESSURE: 70 MMHG | BODY MASS INDEX: 26.1 KG/M2 | OXYGEN SATURATION: 100 % | HEART RATE: 74 BPM | RESPIRATION RATE: 16 BRPM | SYSTOLIC BLOOD PRESSURE: 118 MMHG | WEIGHT: 152.9 LBS | TEMPERATURE: 98.5 F

## 2023-10-09 DIAGNOSIS — M89.8X8 MASS OF SKULL: ICD-10-CM

## 2023-10-09 DIAGNOSIS — M89.8X8 MASS OF SKULL: Primary | ICD-10-CM

## 2023-10-09 PROCEDURE — 99213 OFFICE O/P EST LOW 20 MIN: CPT | Performed by: PHYSICIAN ASSISTANT

## 2023-10-09 PROCEDURE — 70260 X-RAY EXAM OF SKULL: CPT | Mod: TC | Performed by: STUDENT IN AN ORGANIZED HEALTH CARE EDUCATION/TRAINING PROGRAM

## 2023-10-09 ASSESSMENT — PAIN SCALES - GENERAL: PAINLEVEL: MILD PAIN (2)

## 2023-10-09 NOTE — PROGRESS NOTES
"  Assessment & Plan     (M89.8X8) Mass of skull  (primary encounter diagnosis)  Comment:   Plan: XR Skull G/E 4 Views        Based on the bony nature of her skull abnormality, x-ray ordered today for further investigation.  Follow-up to be based on radiology read, possible need for increased detailed imaging such as CT scan of her skull.  Patient did have an MRI in April of this year for mastoid issues, this preceded her left frontal skull swelling and was negative from a anatomical standpoint             BMI:   Estimated body mass index is 26.45 kg/m  as calculated from the following:    Height as of this encounter: 1.619 m (5' 3.75\").    Weight as of this encounter: 69.4 kg (152 lb 14.4 oz).           Trever Munguia PA-C  [facility]   Allyn is a 42 year old, presenting for the following health issues:  lump on forehead (Doesn't hurt, just has not gone away. Been on forehead for around 2 months )      10/9/2023    10:03 AM   Additional Questions   Roomed by Keely MCCANN       History of Present Illness       Reason for visit:  Hard bump on forehead  Symptom onset:  More than a month  Symptoms include:  Hard bump on forehead  Symptom intensity:  Mild  Symptom progression:  Staying the same  Had these symptoms before:  No    She eats 4 or more servings of fruits and vegetables daily.She consumes 1 sweetened beverage(s) daily.She exercises with enough effort to increase her heart rate 30 to 60 minutes per day.  She exercises with enough effort to increase her heart rate 6 days per week.   She is taking medications regularly.       Patient presents today for evaluation of a skull mass in her left frontal area.  Patient notes that this has been present for the past 2 months, not enlarging in size, not tender.  She denies any pain in her eye, no trouble closing her eye, no temporal pain.  Patient does have an extensive history of sinus issues but notes no current sinus symptoms, " "she is currently being managed by ENT for mastoiditis.  Patient reports that the area in her left frontal skull is not enlarging in size, no overlying skin changes no erythema, no systemic symptoms such as fevers or chills.              Review of Systems         Objective    /70 (BP Location: Right arm, Patient Position: Sitting, Cuff Size: Adult Regular)   Pulse 74   Temp 98.5  F (36.9  C) (Temporal)   Resp 16   Ht 1.619 m (5' 3.75\")   Wt 69.4 kg (152 lb 14.4 oz)   LMP  (LMP Unknown)   SpO2 100%   BMI 26.45 kg/m    Body mass index is 26.45 kg/m .  Physical Exam   GENERAL: healthy, alert and no distress  HENT: ear canals and TM's normal, nose and mouth without ulcers or lesions, oropharynx clear, oral mucous membranes moist, and L frontal skull with bony appearance and palpation of small non mobile mass, no underlying fluctuance, no induration  NECK: no adenopathy, no asymmetry, masses, or scars and thyroid normal to palpation  MS: no gross musculoskeletal defects noted, no edema  NEURO: Normal strength and tone, mentation intact and speech normal  LYMPH: no cervical, supraclavicular, axillary, or inguinal adenopathy                      "

## 2023-10-14 ENCOUNTER — MYC MEDICAL ADVICE (OUTPATIENT)
Dept: FAMILY MEDICINE | Facility: CLINIC | Age: 43
End: 2023-10-14
Payer: COMMERCIAL

## 2023-10-14 DIAGNOSIS — M89.8X8 SKULL MASS: ICD-10-CM

## 2023-10-14 DIAGNOSIS — M89.8X8 MASS OF SKULL: Primary | ICD-10-CM

## 2023-10-16 NOTE — TELEPHONE ENCOUNTER
Patient would like to have CT ordered to further evaluate mass of skull. Order pended for your review.    Kacey Luque RN, BSN, PHN  [facility]  [phone]

## 2023-10-27 ENCOUNTER — TRANSFERRED RECORDS (OUTPATIENT)
Dept: HEALTH INFORMATION MANAGEMENT | Facility: CLINIC | Age: 43
End: 2023-10-27
Payer: COMMERCIAL

## 2023-11-07 ENCOUNTER — ANCILLARY PROCEDURE (OUTPATIENT)
Dept: MAMMOGRAPHY | Facility: CLINIC | Age: 43
End: 2023-11-07
Attending: FAMILY MEDICINE
Payer: COMMERCIAL

## 2023-11-07 ENCOUNTER — ANCILLARY PROCEDURE (OUTPATIENT)
Dept: CT IMAGING | Facility: CLINIC | Age: 43
End: 2023-11-07
Attending: PHYSICIAN ASSISTANT
Payer: COMMERCIAL

## 2023-11-07 DIAGNOSIS — M89.8X8 MASS OF SKULL: ICD-10-CM

## 2023-11-07 DIAGNOSIS — Z12.31 VISIT FOR SCREENING MAMMOGRAM: ICD-10-CM

## 2023-11-07 PROCEDURE — 77063 BREAST TOMOSYNTHESIS BI: CPT | Mod: GC

## 2023-11-07 PROCEDURE — 70450 CT HEAD/BRAIN W/O DYE: CPT | Performed by: RADIOLOGY

## 2023-11-07 PROCEDURE — 77067 SCR MAMMO BI INCL CAD: CPT | Mod: GC

## 2023-11-30 ENCOUNTER — OFFICE VISIT (OUTPATIENT)
Dept: FAMILY MEDICINE | Facility: CLINIC | Age: 43
End: 2023-11-30
Payer: COMMERCIAL

## 2023-11-30 VITALS
HEIGHT: 64 IN | SYSTOLIC BLOOD PRESSURE: 118 MMHG | WEIGHT: 154.2 LBS | DIASTOLIC BLOOD PRESSURE: 68 MMHG | BODY MASS INDEX: 26.32 KG/M2 | OXYGEN SATURATION: 100 % | TEMPERATURE: 97.6 F | RESPIRATION RATE: 21 BRPM | HEART RATE: 80 BPM

## 2023-11-30 DIAGNOSIS — H92.01 RIGHT EAR PAIN: Primary | ICD-10-CM

## 2023-11-30 DIAGNOSIS — Z12.11 SCREEN FOR COLON CANCER: ICD-10-CM

## 2023-11-30 PROCEDURE — 90746 HEPB VACCINE 3 DOSE ADULT IM: CPT | Performed by: FAMILY MEDICINE

## 2023-11-30 PROCEDURE — 99213 OFFICE O/P EST LOW 20 MIN: CPT | Mod: 25 | Performed by: FAMILY MEDICINE

## 2023-11-30 PROCEDURE — 90471 IMMUNIZATION ADMIN: CPT | Performed by: FAMILY MEDICINE

## 2023-11-30 ASSESSMENT — PAIN SCALES - GENERAL: PAINLEVEL: MILD PAIN (3)

## 2023-11-30 NOTE — NURSING NOTE
Prior to immunization administration, verified patients identity using patient s name and date of birth. Please see Immunization Activity for additional information.     Screening Questionnaire for Adult Immunization    Are you sick today?   No   Do you have allergies to medications, food, a vaccine component or latex?   No   Have you ever had a serious reaction after receiving a vaccination?   No   Do you have a long-term health problem with heart, lung, kidney, or metabolic disease (e.g., diabetes), asthma, a blood disorder, no spleen, complement component deficiency, a cochlear implant, or a spinal fluid leak?  Are you on long-term aspirin therapy?   No   Do you have cancer, leukemia, HIV/AIDS, or any other immune system problem?   No   Do you have a parent, brother, or sister with an immune system problem?   No   In the past 3 months, have you taken medications that affect  your immune system, such as prednisone, other steroids, or anticancer drugs; drugs for the treatment of rheumatoid arthritis, Crohn s disease, or psoriasis; or have you had radiation treatments?   No   Have you had a seizure, or a brain or other nervous system problem?   No   During the past year, have you received a transfusion of blood or blood    products, or been given immune (gamma) globulin or antiviral drug?   No   For women: Are you pregnant or is there a chance you could become       pregnant during the next month?   No   Have you received any vaccinations in the past 4 weeks?   No     Immunization questionnaire answers were all negative.            Patient instructed to remain in clinic for 15 minutes afterwards, and to report any adverse reactions.     Screening performed by Sarah Locke MA on 11/30/2023 at 9:27 AM.

## 2023-11-30 NOTE — PROGRESS NOTES
"  Assessment & Plan     (H92.01) Right ear pain  (primary encounter diagnosis)  Comment: normal exam today, I suspect eustachian tube dysfunction, continue over the counter decongestants  Plan: follow up with ENT specialist as planned    (Z12.11) Screen for colon cancer  Plan: Colonoscopy Screening  Referral      Noelle Lundy MD  [facility]    Kailyn Gruber is a 43 year old, presenting for the following health issues:  Right ear pain  Onset 2 weeks ago with minor URI symptoms (sinus congestion) without fever.  Her children have had recurrent UTIs since starting school.    office visit        11/30/2023     9:04 AM   Additional Questions   Roomed by MICHEL Catherine   Accompanied by self         11/30/2023     9:04 AM   Patient Reported Additional Medications   Patient reports taking the following new medications none       History of Present Illness       Headaches:   Since the patient's last clinic visit, headaches are: no change  The patient is getting headaches:  Tension and sinus headaches on occasion  She is able to do normal daily activities when she has a migraine.  The patient is taking the following rescue/relief medications:  Ibuprofen (Advil, Motrin)   Patient states \"I get some relief\" from the rescue/relief medications.   The patient is taking the following medications to prevent migraines:  No medications to prevent migraines  In the past 4 weeks, the patient has gone to an Urgent Care or Emergency Room 0 times times due to headaches.    Reason for visit:  Ear pain - ongoing issue but acute symptoms returned    She eats 2-3 servings of fruits and vegetables daily.She consumes 1 sweetened beverage(s) daily.She exercises with enough effort to increase her heart rate 30 to 60 minutes per day.  She exercises with enough effort to increase her heart rate 4 days per week.   She is taking medications regularly.         Review of Systems   Constitutional, HEENT, " "cardiovascular, pulmonary, gi and gu systems are negative, except as otherwise noted.      Objective    /68 (BP Location: Right arm, Patient Position: Sitting, Cuff Size: Adult Regular)   Pulse 80   Temp 97.6  F (36.4  C) (Temporal)   Resp 21   Ht 1.626 m (5' 4\")   Wt 69.9 kg (154 lb 3.2 oz)   LMP  (LMP Unknown)   SpO2 100%   BMI 26.47 kg/m    Body mass index is 26.47 kg/m .  Physical Exam   GENERAL: healthy, alert and no distress  EYES: Eyes grossly normal to inspection, PERRL and conjunctivae and sclerae normal  HENT: ear canals and TM's normal, nose and mouth without ulcers or lesions  NECK: no adenopathy, no asymmetry, masses, or scars and thyroid normal to palpation  MS: no gross musculoskeletal defects noted, no edema                      "

## 2023-11-30 NOTE — PATIENT INSTRUCTIONS
Afrin; can use nightly 3 nights in a row, then off for a week.  Sinus Rinse/Neti Pot Instructions  Time Required: 3 to 5 minutes twice daily; before using steroid nasal spray.  Fill the neti pot with water. The water should be lukewarm (not too hot, not too cold) and can be poured into the pot directly from the tap (approximately 1/2 cup of water). Distilled water is recommended if the purity/safety of the tap water in your region is questionable.   Add 1/4 to 1/2 teaspoon of pickling salt, sea salt or table salt (without added iodine) to the water. Stir with spoon to dissolve thoroughly. You may also use prepackaged packets.  Lean your head forward over the basin, bending your neck down slightly with your eyes looking downwards.   Gently place the spout of the neti pot inside your right nostril, forming a seal to avoid any outer leakage.   Open your mouth slightly. Breathe continuously through your open mouth during this sinus cleansing procedure. This allows a necessary air passageway so that the water will not drain from behind your nose into your mouth.   Tilt your head sideways, so that your right nostril is directly above your left nostril. Tip the neti pot, allowing the water solution to pour into your right nostril. Within a few seconds the water will naturally drain from your left nostril into the sink.   After the net pot is empty, remove the spout from your right nostril, and exhale through both nostrils. Gently blow your nose into a tissue.   Repeat steps 1 through 7 for your left nostril.  Tips:   Thoroughly clean your Neti Pot after each use. Periodically place it in your  for a thorough sanitizing.Same as a toothbrush, do not share your neti pot with anyone else. Everyone in the household should have their own neti pot.   Try using only half the amount of recommended salt the first few times you use your neti pot until you become more accustomed to the process.   Applying a thin layer of  petroleum jelly on the inside of both nostrils before the treatment helps sooth sensitive skin.   You may notice improved breathing, smell and taste.   If you experience any discomfort please discontinue using your neti pot and consult your primary care doctor.

## 2023-12-12 ENCOUNTER — MYC MEDICAL ADVICE (OUTPATIENT)
Dept: FAMILY MEDICINE | Facility: CLINIC | Age: 43
End: 2023-12-12
Payer: COMMERCIAL

## 2023-12-13 ENCOUNTER — NURSE TRIAGE (OUTPATIENT)
Dept: FAMILY MEDICINE | Facility: CLINIC | Age: 43
End: 2023-12-13
Payer: COMMERCIAL

## 2023-12-13 NOTE — TELEPHONE ENCOUNTER
Message from OB: If patient wants to see Dr Figueroa she can call and schedule next available appointment with her at the OB clinic or can see FP if they can get her in sooner. She was last in our clinic almost 2 years ago. Looks like [facility] next available is 1/10/24. Thank you!     ---  Left detailed message advising of the above and that Dr. Lundy next non-urgent appt is late February.  Left both clinic call back numbers for patient to schedule or with further questions.    Sabrina CODY RN  [facility]

## 2023-12-13 NOTE — TELEPHONE ENCOUNTER
"Nadeent message from 12/12/23:  On Friday I started having pain in my lower right abdomen - especially when walking/exercising. I assumed it was another ruptured ovarian cyst, as I have had before. It got better by the end of day Saturday.     Yesterday the pain returned, and I figured maybe it was another cyst. It still hurts today, especially when I walk a lot.     I have not had any fever or other symptoms. I had not been bleeding at all, but I just noticed I am bleeding some vaginally now. Not a lot - no more than a normal period for the moment.  One brief instance of blood - just in the evening.  Small amount but brighter in color than period would normally be.     Do you think I should get an appointment for this? I was going to reach out to my obgyn, Dr Figueroa, but my chart didn t give me that option!   ------  Called patient to triage.    Pain is better today - \"feels like period cramps\"  Feels \"a little bloated,\" no tenderness  Bleeding was a singular, time-limited episode and stopped prior to bedtime.  She notes that since IUD placement 5.5 years ago gets \"very sporatic random periods.\"  One loose stool yesterday which pt reports often accompanies her cycle.    Pt states that per last discussion with OB ~ one year ago that a new IUD may be needed.    OB: do you feel this needs an appointment and if so, with you or with PCP?    Appropriate triage team: call back to mobile, ok to [facility]    [facility]      Reason for Disposition   MILD pain (e.g., does not interfere with normal activities) and pain comes and goes (cramps) lasts > 48 hours  (Exception: This same abdominal pain is a chronic symptom recurrent or ongoing AND present > 4 weeks.)    Additional Information   Negative: Passed out (i.e., fainted, collapsed and was not responding)   Negative: Shock suspected (e.g., cold/pale/clammy skin, too weak to stand, low BP, rapid pulse)   Negative: Sounds like a " life-threatening emergency to the triager   Negative: Followed an abdomen (stomach) injury   Negative: Chest pain   Negative: Abdominal pain and pregnant < 20 weeks   Negative: Abdominal pain and pregnant 20 or more weeks   Negative: Pain is mainly in upper abdomen (if needed ask: 'is it mainly above the belly button?')   Negative: Abdomen bloating or swelling are main symptoms   Negative: SEVERE abdominal pain (e.g., excruciating)   Negative: Vomiting red blood or black (coffee ground) material   Negative: Blood in bowel movements  (Exception: Blood on surface of BM with constipation.)   Negative: Black or tarry bowel movements  (Exception: Chronic-unchanged black-grey BMs AND is taking iron pills or Pepto-Bismol.)   Negative: MILD TO MODERATE constant pain lasting > 2 hours   Negative: Vomiting bile (green color)   Negative: Patient sounds very sick or weak to the triager   Negative: Vomiting and abdomen looks much more swollen than usual   Negative: White of the eyes have turned yellow (i.e., jaundice)   Negative: Blood in urine (red, pink, or tea-colored)   Negative: Fever > 103 F (39.4 C)   Negative: Fever > 101 F (38.3 C) and over 60 years of age   Negative: Fever > 100.0 F (37.8 C) and has diabetes mellitus or a weak immune system (e.g., HIV positive, cancer chemotherapy, organ transplant, splenectomy, chronic steroids)   Negative: Fever > 100.0 F (37.8 C) and bedridden (e.g., CVA, chronic illness, recovering from surgery)   Negative: Pregnant or could be pregnant (i.e., missed last menstrual period)   Negative: MODERATE pain (e.g., interferes with normal activities that comes and goes (cramps) lasts > 24 hours  (Exception: Pain with Vomiting or Diarrhea - see that Protocol.)   Negative: Unusual vaginal discharge   Negative: Age > 60 years   Negative: Patient wants to be seen    Protocols used: Abdominal Pain - Female-A-[place]

## 2023-12-27 DIAGNOSIS — F41.1 GENERALIZED ANXIETY DISORDER: ICD-10-CM

## 2023-12-29 RX ORDER — CITALOPRAM HYDROBROMIDE 10 MG/1
10 TABLET ORAL DAILY
Qty: 90 TABLET | Refills: 2 | Status: SHIPPED | OUTPATIENT
Start: 2023-12-29 | End: 2024-09-06

## 2024-01-04 DIAGNOSIS — H93.8X1 EAR PRESSURE, RIGHT: Primary | ICD-10-CM

## 2024-01-09 ENCOUNTER — OFFICE VISIT (OUTPATIENT)
Dept: AUDIOLOGY | Facility: CLINIC | Age: 44
End: 2024-01-09
Payer: COMMERCIAL

## 2024-01-09 ENCOUNTER — OFFICE VISIT (OUTPATIENT)
Dept: OTOLARYNGOLOGY | Facility: CLINIC | Age: 44
End: 2024-01-09
Payer: COMMERCIAL

## 2024-01-09 VITALS
HEART RATE: 66 BPM | DIASTOLIC BLOOD PRESSURE: 76 MMHG | WEIGHT: 152 LBS | OXYGEN SATURATION: 96 % | SYSTOLIC BLOOD PRESSURE: 112 MMHG | TEMPERATURE: 98.1 F | BODY MASS INDEX: 26.09 KG/M2

## 2024-01-09 DIAGNOSIS — H69.91 ACUTE DYSFUNCTION OF RIGHT EUSTACHIAN TUBE: ICD-10-CM

## 2024-01-09 DIAGNOSIS — H90.71 MIXED CONDUCTIVE AND SENSORINEURAL HEARING LOSS OF RIGHT EAR WITH UNRESTRICTED HEARING OF LEFT EAR: Primary | ICD-10-CM

## 2024-01-09 DIAGNOSIS — H93.8X1 EAR PRESSURE, RIGHT: Primary | ICD-10-CM

## 2024-01-09 PROCEDURE — 92557 COMPREHENSIVE HEARING TEST: CPT | Performed by: AUDIOLOGIST

## 2024-01-09 PROCEDURE — 99202 OFFICE O/P NEW SF 15 MIN: CPT | Performed by: REGISTERED NURSE

## 2024-01-09 PROCEDURE — 92550 TYMPANOMETRY & REFLEX THRESH: CPT | Performed by: AUDIOLOGIST

## 2024-01-09 ASSESSMENT — PAIN SCALES - GENERAL: PAINLEVEL: NO PAIN (1)

## 2024-01-09 NOTE — NURSING NOTE
Chief Complaint   Patient presents with    RECHECK     Follow up  with tympanogram      .Blood pressure 112/76, pulse 66, temperature 98.1  F (36.7  C), weight 68.9 kg (152 lb), SpO2 96%, not currently breastfeeding.  Marlon Cyr LPN

## 2024-01-09 NOTE — PROGRESS NOTES
Otolaryngology Clinic  January 9, 2024    HPI:  Allyn Mcqueen is here for a recheck of their right ear. Last seen in clinic 9/19/23 by Dr. Nissen for a persistent right effusion that is been present since April 2023.  Patient had seen a previous ENT who performed a myringotomy and attempted tube placement, however, patient could not tolerate tube placement in clinic due to vasovagal reaction.  Previous ENT provider recommended to place it under anesthesia.  Dr. Nissen also noted in his previous note that ear cleaning made patient lightheaded.  Recommended Flonase and Bactrim with frequent Valsalva's to treat this right eustachian tube dysfunction.    Today, patient reports ongoing right ear pressure and muffled hearing.  Denies any otalgia.    Otologic microscope exam:    Patient's ear pathology required use of the binocular microscope for the purpose of cleaning and improving visualization in order to assure a more accurate diagnostic evaluation.    Right ear was examined under the microscope.  Ear canal is clean and dry. TM visualized under microscope.  TM is intact with dull middle ear space.  Appears to be a serous effusion.  Unable to pop right ear with Valsalva maneuver.    Left ear was also examined under the microscope.  Ear canal is clean and dry, free of cerumen.  Normal appearing TM, nicely aerated middle ear space.     Audiogram: 1/9/2024 - data independently reviewed  Right ear: Normal sloping to profound mixed hearing loss  Left ear: Normal hearing  % at 60 dB bilaterally  Acoustic Reflexes: Absent in left contra.  Present in all other conditions  Tympanograms: type As right, type A left     Assessment and Plan:  1. Acute dysfunction of right eustachian tube  Patient with persistent right eustachian tube dysfunction.  There is evidence of a serous effusion in the right middle ear on today's exam.  Based on previous history, would not attempt a tube placement in clinic today.  I will discuss  with my neurotology colleagues moving forward with a PE tube placement in the right ear under anesthesia.  Patient is comfortable proceeding with a PE tube.  Reviewed dry ear precaution and risks of PE tube with patient today.  I will contact my colleagues and have their team reach out to patient regarding plan.    Carley Monae DNP, APRN, CNP  Otolaryngology  Head & Neck Surgery  [phone]    20 minutes spent on the date of the encounter doing chart review, history and exam, documentation and further activities per the note

## 2024-01-09 NOTE — LETTER
1/9/2024       RE: Allyn Mcqueen  [address]  [place] [address]     Dear Colleague,    Thank you for referring your patient, Allyn Mcqueen, to the [facility] [place] [facility]. Please see a copy of my visit note below.      Otolaryngology Clinic  January 9, 2024    HPI:  Allyn Mcqueen is here for a recheck of their right ear. Last seen in clinic 9/19/23 by Dr. Nissen for a persistent right effusion that is been present since April 2023.  Patient had seen a previous ENT who performed a myringotomy and attempted tube placement, however, patient could not tolerate tube placement in clinic due to vasovagal reaction.  Previous ENT provider recommended to place it under anesthesia.  Dr. Nissen also noted in his previous note that ear cleaning made patient lightheaded.  Recommended Flonase and Bactrim with frequent Valsalva's to treat this right eustachian tube dysfunction.    Today, patient reports ongoing right ear pressure and muffled hearing.  Denies any otalgia.    Otologic microscope exam:    Patient's ear pathology required use of the binocular microscope for the purpose of cleaning and improving visualization in order to assure a more accurate diagnostic evaluation.    Right ear was examined under the microscope.  Ear canal is clean and dry. TM visualized under microscope.  TM is intact with dull middle ear space.  Appears to be a serous effusion.  Unable to pop right ear with Valsalva maneuver.    Left ear was also examined under the microscope.  Ear canal is clean and dry, free of cerumen.  Normal appearing TM, nicely aerated middle ear space.     Audiogram: 1/9/2024 - data independently reviewed  Right ear: Normal sloping to profound mixed hearing loss  Left ear: Normal hearing  % at 60 dB bilaterally  Acoustic Reflexes: Absent in left contra.  Present in all other conditions  Tympanograms: type As  right, type A left     Assessment and Plan:  1. Acute dysfunction of right eustachian tube  Patient with persistent right eustachian tube dysfunction.  There is evidence of a serous effusion in the right middle ear on today's exam.  Based on previous history, would not attempt a tube placement in clinic today.  I will discuss with my neurotology colleagues moving forward with a PE tube placement in the right ear under anesthesia.  Patient is comfortable proceeding with a PE tube.  Reviewed dry ear precaution and risks of PE tube with patient today.  I will contact my colleagues and have their team reach out to patient regarding plan.    Carley Monae DNP, APRN, CNP  Otolaryngology  Head & Neck Surgery  [phone]    20 minutes spent on the date of the encounter doing chart review, history and exam, documentation and further activities per the note      Again, thank you for allowing me to participate in the care of your patient.      Sincerely,    Sandra Monae, NP

## 2024-01-09 NOTE — PROGRESS NOTES
AUDIOLOGY REPORT    SUMMARY: Audiology visit completed. See audiogram for results.      RECOMMENDATIONS: Follow-up with ENT.    Annabel Mak, CCC-A  Clinical Audiologist  [place] #[id number]

## 2024-01-25 ENCOUNTER — OFFICE VISIT (OUTPATIENT)
Dept: OBGYN | Facility: CLINIC | Age: 44
End: 2024-01-25
Payer: COMMERCIAL

## 2024-01-25 VITALS
HEART RATE: 60 BPM | BODY MASS INDEX: 26.26 KG/M2 | WEIGHT: 153.8 LBS | HEIGHT: 64 IN | TEMPERATURE: 98.1 F | SYSTOLIC BLOOD PRESSURE: 114 MMHG | DIASTOLIC BLOOD PRESSURE: 73 MMHG

## 2024-01-25 DIAGNOSIS — Z87.42 HISTORY OF OVARIAN CYST: ICD-10-CM

## 2024-01-25 DIAGNOSIS — R10.31 RLQ ABDOMINAL PAIN: Primary | ICD-10-CM

## 2024-01-25 PROCEDURE — 99213 OFFICE O/P EST LOW 20 MIN: CPT | Performed by: OBSTETRICS & GYNECOLOGY

## 2024-01-25 ASSESSMENT — PATIENT HEALTH QUESTIONNAIRE - PHQ9: SUM OF ALL RESPONSES TO PHQ QUESTIONS 1-9: 1

## 2024-01-25 NOTE — PROGRESS NOTES
S; Allyn Mcqueen is a 43 year old  who presents to discuss ovarian cysts.  She has a h/o ovarian cyst formation and rupture, nothing that has required surgical intervention.  After her second child she had a mirena IUD placed and has liked it both for contraception and also for management of menses.  She does have occasional periods, but very light.  Over the past year she has noticed that she may have had a few cysts rupture based on her symptoms.  It is not bothersome to her, but wondering if replacing her IUD early would help prevent new cysts from forming.  She reports overall feeling good now, but mild RLQ discomfort, so maybe a cyst is forming?    She gets routine preventative care with PCP, UTD on screening, etc.    Past Medical History:   Diagnosis Date    Anxiety     worse post-partum    Esophageal reflux 2018    started during pregnancy; got worse year later    Ovarian cyst rupture 2010    Palpitations     off and on for years; more off now    Recurrent otitis media     Stomach problems 2018    started post-pregnancy in 2018     Past Surgical History:   Procedure Laterality Date    HEAD & NECK SURGERY      ZZC SPINAL FUSION,ANT,EA ADNL LEVEL      fused cervical vertebrae ?c3-4     OB History    Para Term  AB Living   2 2 2 0 0 2   SAB IAB Ectopic Multiple Live Births   0 0 0 0 2      # Outcome Date GA Lbr Hill/2nd Weight Sex Delivery Anes PTL Lv   2 Term 1818 39w6d  3.912 kg (8 lb 10 oz) M Vag-Spont  N MACIE      Name: Jean      Apgar1: 9  Apgar5: 9   1 Term /15 39w3d 09:10 / 02:00 3.719 kg (8 lb 3.2 oz) M Vag-Spont EPI  MACIE      Name: Manuel      Apgar1: 9  Apgar5: 9        Allergies   Allergen Reactions    Adhesive Tape Rash    Amoxicillin Rash    Cefixime Rash    Latex Rash       Current Outpatient Medications:     citalopram (CELEXA) 10 MG tablet, TAKE 1 TABLET (10 MG) BY MOUTH DAILY, Disp: 90 tablet, Rfl: 2    fluticasone (FLONASE) 50 MCG/ACT nasal spray, Spray 2  sprays into both nostrils 2 times daily For 2 months then 2 sprays daily after, Disp: 18.2 mL, Rfl: 8    levonorgestrel (MIRENA) 20 MCG/24HR IUD, 1 each by Intrauterine route once, Disp: , Rfl:     omeprazole (PRILOSEC) 20 MG DR capsule, Take 1 capsule by mouth every 24 hours, Disp: , Rfl:     Social History     Socioeconomic History    Marital status:      Spouse name: Not on file    Number of children: Not on file    Years of education: Not on file    Highest education level: Not on file   Occupational History     Employer: [employer]     Comment: organizer [employer]   Tobacco Use    Smoking status: Never    Smokeless tobacco: Never   Vaping Use    Vaping Use: Never used   Substance and Sexual Activity    Alcohol use: Yes     Comment: occ    Drug use: No    Sexual activity: Yes     Partners: Male     Birth control/protection: I.U.D.   Other Topics Concern    Parent/sibling w/ CABG, MI or angioplasty before 65F 55M? No   Social History Narrative    Caffeine intake/servings daily - 0    Calcium intake/servings daily - 3    Exercise 3 times weekly - describe yoga, runs, bikes    Sunscreen used - Yes    Seatbelts used - Yes    Guns stored in the home - Yes    Self Breast Exam - Yes    Pap test up to date -  Yes    Eye exam up to date -  Yes    Dental exam up to date -  Yes    DEXA scan up to date -  No    Flex Sig/Colonoscopy up to date -  No    Mammography up to date -  No    Immunizations reviewed and up to date - Yes    Abuse: Current or Past (Physical, Sexual or Emotional) - No    Do you feel safe in your environment - Yes    Do you cope well with stress - Yes    Do you suffer from insomnia - No    Last updated by: Betzaida Mckeon  12/2/2014         Social Determinants of Health     Financial Resource Strain: Low Risk  (11/30/2023)    Financial Resource Strain     Within the past 12 months, have you or your family members you live with been unable to get utilities (heat, electricity) when it  "was really needed?: No   Food Insecurity: Low Risk  (11/30/2023)    Food Insecurity     Within the past 12 months, did you worry that your food would run out before you got money to buy more?: No     Within the past 12 months, did the food you bought just not last and you didn t have money to get more?: No   Transportation Needs: Low Risk  (11/30/2023)    Transportation Needs     Within the past 12 months, has lack of transportation kept you from medical appointments, getting your medicines, non-medical meetings or appointments, work, or from getting things that you need?: No   Physical Activity: Not on file   Stress: Not on file   Social Connections: Not on file   Interpersonal Safety: Low Risk  (10/9/2023)    Interpersonal Safety     Do you feel physically and emotionally safe where you currently live?: Yes     Within the past 12 months, have you been hit, slapped, kicked or otherwise physically hurt by someone?: No     Within the past 12 months, have you been humiliated or emotionally abused in other ways by your partner or ex-partner?: No   Housing Stability: Low Risk  (11/30/2023)    Housing Stability     Do you have housing? : Yes     Are you worried about losing your housing?: No     Family History   Problem Relation Age of Onset    Respiratory Mother         COPD    Psychotic Disorder Mother         Bi polar    Mental Illness Mother         diagnosed bipolar; depression    Lipids Father     Heart Disease Maternal Grandmother     Cerebrovascular Disease Maternal Grandmother     Cancer Paternal Aunt         breast 60s     Past medical, surgical, social and family history were reviewed and updated in EPIC.    PE: /73   Pulse 60   Temp 98.1  F (36.7  C)   Ht 1.626 m (5' 4\")   Wt 69.8 kg (153 lb 12.8 oz)   LMP 12/05/2023 (Approximate)   BMI 26.40 kg/m      Gen: NAD    A/P: RLQ pain in setting of h/o ovarian cyst rupture   We discussed that in general ovarian cysts are not worrisome and typically " resolve spontaneously.  We discussed that many/most come from fluid/blood filling back into the egg sac after ovulation.  We discussed that when these rupture, the pain is due to irritation of the peritoneal wall from the fluid or blood and is usually self limited.  If large enough, or not resolving spontaneously, then sometimes surgical intervention is needed.  I explained that since mirena does not inhibit ovulation in the majority of users, it is not necessarily a tool to prevent cyst formation and replacement may not make a difference.  We discussed that if symptoms are bothersome, using hormonal medication like OCP, depo, nuvaring, etc to inhibit ovulation would be an option, with or without mirena.  At this time it is not bothersome for her.   We discussed that since she has some persistent pain, it would be helpful to get pelvic us and assess ovaries.  We discussed based on findings we may need to monitor cysts or consider surgical intervention if very large.  She is agreeable.  Will order.  Beyond that, she will monitor symptoms and if bothersome, reach out to start meds.  Plan to replace IUD in 2 years or prn.  Questions answered    FRANK QUAN MD

## 2024-01-26 ENCOUNTER — MYC MEDICAL ADVICE (OUTPATIENT)
Dept: OTOLARYNGOLOGY | Facility: CLINIC | Age: 44
End: 2024-01-26

## 2024-02-26 ENCOUNTER — HOSPITAL ENCOUNTER (OUTPATIENT)
Dept: ULTRASOUND IMAGING | Facility: CLINIC | Age: 44
Discharge: HOME | End: 2024-02-26
Attending: OBSTETRICS & GYNECOLOGY | Admitting: OBSTETRICS & GYNECOLOGY
Payer: COMMERCIAL

## 2024-02-26 DIAGNOSIS — R10.31 RLQ ABDOMINAL PAIN: ICD-10-CM

## 2024-02-26 DIAGNOSIS — Z87.42 HISTORY OF OVARIAN CYST: ICD-10-CM

## 2024-02-26 PROCEDURE — 76830 TRANSVAGINAL US NON-OB: CPT | Mod: 26 | Performed by: RADIOLOGY

## 2024-02-26 PROCEDURE — 76856 US EXAM PELVIC COMPLETE: CPT | Mod: 26 | Performed by: RADIOLOGY

## 2024-02-26 PROCEDURE — 76830 TRANSVAGINAL US NON-OB: CPT

## 2024-02-28 DIAGNOSIS — N83.202 LEFT OVARIAN CYST: Primary | ICD-10-CM

## 2024-03-19 ENCOUNTER — TELEPHONE (OUTPATIENT)
Dept: OTOLARYNGOLOGY | Facility: OTHER | Age: 44
End: 2024-03-19
Payer: COMMERCIAL

## 2024-03-19 ENCOUNTER — TELEPHONE (OUTPATIENT)
Dept: OTOLARYNGOLOGY | Facility: CLINIC | Age: 44
End: 2024-03-19
Payer: COMMERCIAL

## 2024-03-19 NOTE — TELEPHONE ENCOUNTER
Patient called in requesting to schedule. Transferred to Benjamin Noel, Perioperative Coordinator 3/19/2024 at 1:43 PM

## 2024-03-25 ENCOUNTER — TELEPHONE (OUTPATIENT)
Dept: OTOLARYNGOLOGY | Facility: OTHER | Age: 44
End: 2024-03-25
Payer: COMMERCIAL

## 2024-03-25 NOTE — TELEPHONE ENCOUNTER
LVM that a ENT audio within a few weeks of Dr. Ray's visit is needed as last one was in January and the clinic requires one within 2 months of ENT visit. Also since the patient has never seen Dr. Ray before, the first visit will be a consult and then he can decide if tubes are needed and get that scheduled accordingly.       Gave call center number

## 2024-04-01 ENCOUNTER — TELEPHONE (OUTPATIENT)
Dept: OTOLARYNGOLOGY | Facility: CLINIC | Age: 44
End: 2024-04-01
Payer: COMMERCIAL

## 2024-04-01 NOTE — TELEPHONE ENCOUNTER
This patient can reschedule their appt with Dr. Martinez on 7/11 to be with Dr. Dougherty([facility]), Otis ([facility]), or Michelle ([facility] or [facility]) first available [facility]. Last checked was end of May with Dr. Berg

## 2024-04-03 NOTE — TELEPHONE ENCOUNTER
FUTURE VISIT INFORMATION      FUTURE VISIT INFORMATION:  Date: 5/29/24  Time: 10:45 AM  [place] [facility] [place]  REFERRAL INFORMATION:  Referring provider:    Referring providers clinic:    Reason for visit/diagnosis:  Possible Tube placement - Referred by Carley ROWELL at [facility] [place]. LVM to schedule ENT audio within a few weeks of visit     RECORDS REQUESTED FROM      Clinic name Comments Records Status Imaging Status   [facility] Otolaryngology & Audiology - [facility] 1/9/24 note- Sandra Monae NP   9/19/23 note- Nissen, Rick L, MD     1/9/24, 9/19/23 audiogram [facility]  11/7/23 CT head  4/19/23 MR skull base [facility] PACS   [facility] 12/2022 - 6/2023 OV note  5/3/23 audiogram  2/15/22 audiogram Scanned in Epic

## 2024-04-05 ENCOUNTER — TELEPHONE (OUTPATIENT)
Dept: OTOLARYNGOLOGY | Facility: CLINIC | Age: 44
End: 2024-04-05
Payer: COMMERCIAL

## 2024-04-06 ENCOUNTER — E-VISIT (OUTPATIENT)
Dept: FAMILY MEDICINE | Facility: CLINIC | Age: 44
End: 2024-04-06
Payer: COMMERCIAL

## 2024-04-06 DIAGNOSIS — H10.31 ACUTE BACTERIAL CONJUNCTIVITIS OF RIGHT EYE: Primary | ICD-10-CM

## 2024-04-06 PROCEDURE — 99421 OL DIG E/M SVC 5-10 MIN: CPT | Performed by: NURSE PRACTITIONER

## 2024-04-07 ENCOUNTER — OFFICE VISIT (OUTPATIENT)
Dept: URGENT CARE | Facility: URGENT CARE | Age: 44
End: 2024-04-07
Payer: COMMERCIAL

## 2024-04-07 VITALS
TEMPERATURE: 97.5 F | HEIGHT: 64 IN | OXYGEN SATURATION: 99 % | BODY MASS INDEX: 27.83 KG/M2 | RESPIRATION RATE: 16 BRPM | DIASTOLIC BLOOD PRESSURE: 78 MMHG | SYSTOLIC BLOOD PRESSURE: 112 MMHG | HEART RATE: 72 BPM | WEIGHT: 163 LBS

## 2024-04-07 DIAGNOSIS — J01.00 ACUTE NON-RECURRENT MAXILLARY SINUSITIS: ICD-10-CM

## 2024-04-07 DIAGNOSIS — J30.89 ALLERGIC RHINITIS DUE TO OTHER ALLERGIC TRIGGER, UNSPECIFIED SEASONALITY: ICD-10-CM

## 2024-04-07 DIAGNOSIS — H10.501 BLEPHAROCONJUNCTIVITIS OF RIGHT EYE, UNSPECIFIED BLEPHAROCONJUNCTIVITIS TYPE: Primary | ICD-10-CM

## 2024-04-07 PROCEDURE — 99213 OFFICE O/P EST LOW 20 MIN: CPT | Performed by: EMERGENCY MEDICINE

## 2024-04-07 RX ORDER — OFLOXACIN 3 MG/ML
1-2 SOLUTION/ DROPS OPHTHALMIC 4 TIMES DAILY
Qty: 5 ML | Refills: 0 | Status: SHIPPED | OUTPATIENT
Start: 2024-04-07 | End: 2024-04-14

## 2024-04-07 NOTE — PROGRESS NOTES
Assessment & Plan     Diagnosis:    ICD-10-CM    1. Blepharoconjunctivitis of right eye, unspecified blepharoconjunctivitis type  H10.501 ofloxacin (OCUFLOX) 0.3 % ophthalmic solution      2. Acute non-recurrent maxillary sinusitis  J01.00       3. Allergic rhinitis due to other allergic trigger, unspecified seasonality  J30.89           Medical Decision Making:  Allyn Mcqueen is a 43 year old female who presents for evaluation of a red eye.  A broad differential diagnosis was considered including bacterial conjunctivitis, viral conjunctivitis, foreign body, corneal abrasion, chemical vs allergic conjunctivitis, orbital cellulitis, etc.  Signs and symptoms consistent with a conjunctivitis, likely bacterial. Will start antibiotics and have close follow-up of eye physician.  No red flag symptoms to suggest any of the above worrisome etiologies.  She does have acute sinusitis as well does feel like her allergies have been bad recently.  No indication for antibiotics at this time or signs of otitis media, bacterial or fungal sinusitis or other infectious process.    Jose Dexter PA-C  [facility]    Subjective     Allyn Mcqueen is a 43 year old female who presents to clinic today for the following health issues:  Chief Complaint   Patient presents with    Eye Problem     X1 day of redness, drainage and swelling in right eye     Sinus Problem     X9 days of congestion and sinus pressure     Urgent Care       HPI  Patient states that she had a cold over the past week, has had some sinus pressure and pain intermittently, has a history of problems with this as well as eustachian tube dysfunction and ear effusions on the right.  She notes this morning she is having eyelid swelling, drainage and mattering/crust noted on her right eyelid.  No vision changes when she removes the crust but she notes continuous discharge.  She not had any discharge from the ears or ear pain.  She does feel congested,  "has not really been using her nasal sprays.  No headache, fever, cough or sore throat.    Review of Systems    See HPI    Objective      Vitals: /78   Pulse 72   Temp 97.5  F (36.4  C) (Temporal)   Resp 16   Ht 1.626 m (5' 4\")   Wt 73.9 kg (163 lb)   SpO2 99%   BMI 27.98 kg/m        Patient Vitals for the past 24 hrs:   BP Temp Temp src Pulse Resp SpO2 Height Weight   04/07/24 1131 112/78 97.5  F (36.4  C) Temporal 72 16 99 % 1.626 m (5' 4\") 73.9 kg (163 lb)       Vital signs reviewed by: Jose Dexter PA-C    Physical Exam   Constitutional: Patient is alert and cooperative. No acute distress.  Ears: Right TM is normal. Left TM is normal. External ear canals are normal.  Mouth: Mucous membranes are moist. Normal tongue and tonsil. Posterior oropharynx is clear.  Eyes: Conjunctive is injected on the right, upper eyelid is swollen and slightly erythematous.  No fluctuance or areas of pointing.  PERRL.  EOMI.  Nose: Nasal turbinates are slightly swollen and erythematous, right greater than left.  Clear rhinorrhea noted.  No tenderness to percussion over the sinuses.  No septal mass or epistaxis.  Cardiovascular: Regular rate and rhythm  Pulmonary/Chest: Lungs are clear to auscultation throughout. Effort normal. No respiratory distress. No wheezes, rales or rhonchi.  Neurological: Alert and oriented x3. CN 3-7 intact.  Psychiatric:The patient has a normal mood and affect.       Jose Dexter PA-C, April 7, 2024      "

## 2024-04-07 NOTE — PATIENT INSTRUCTIONS
Flonase to help decrease inflammation in the nose. Start the antibiotics drops and if no improvement in 5 days return for recheck.

## 2024-04-08 RX ORDER — POLYMYXIN B SULFATE AND TRIMETHOPRIM 1; 10000 MG/ML; [USP'U]/ML
SOLUTION OPHTHALMIC
Qty: 10 ML | Refills: 0 | Status: SHIPPED | OUTPATIENT
Start: 2024-04-08 | End: 2024-04-08

## 2024-04-08 NOTE — PATIENT INSTRUCTIONS
Thank you for choosing us for your care. I have placed an order for a prescription so that you can start treatment. View your full visit summary for details by clicking on the link below. Your pharmacist will able to address any questions you may have about the medication.     If you re not feeling better within 2-3 days, please schedule an appointment.  You can schedule an appointment right here in [facility], or call [phone]  If the visit is for the same symptoms as your eVisit, we ll refund the cost of your eVisit if seen within seven days.    Pinkeye: Care Instructions  Overview     Pinkeye is redness and swelling of the eye surface and the conjunctiva (the lining of the eyelid and the covering of the white part of the eye). Pinkeye is also called conjunctivitis. Pinkeye is often caused by infection with bacteria or a virus. Dry air, allergies, smoke, and chemicals are other common causes.  Pinkeye often gets better on its own in 7 to 10 days. Antibiotics only help if the pinkeye is caused by bacteria. Pinkeye caused by infection spreads easily. If an allergy or chemical is causing pinkeye, it will not go away unless you can avoid whatever is causing it.  Follow-up care is a key part of your treatment and safety. Be sure to make and go to all appointments, and call your doctor if you are having problems. It's also a good idea to know your test results and keep a list of the medicines you take.  How can you care for yourself at home?  Wash your hands often. Always wash them before and after you treat pinkeye or touch your eyes or face.  Use moist cotton or a clean, wet cloth to remove crust. Wipe from the inside corner of the eye to the outside. Use a clean part of the cloth for each wipe.  Put cold or warm wet cloths on your eye a few times a day if the eye hurts.  Do not wear contact lenses or eye makeup until the pinkeye is gone. Throw away any eye makeup you were using when you got pinkeye. Clean your  "contacts and storage case. If you wear disposable contacts, use a new pair when your eye has cleared and it is safe to wear contacts again.  If the doctor gave you antibiotic ointment or eyedrops, use them as directed. Use the medicine for as long as instructed, even if your eye starts looking better soon. Keep the bottle tip clean, and do not let it touch the eye area.  To put in eyedrops or ointment:  Tilt your head back, and pull your lower eyelid down with one finger.  Drop or squirt the medicine inside the lower lid.  Close your eye for 30 to 60 seconds to let the drops or ointment move around.  Do not touch the ointment or dropper tip to your eyelashes or any other surface.  Do not share towels, pillows, or washcloths while you have pinkeye.  When should you call for help?   Call your doctor now or seek immediate medical care if:    You have pain in your eye, not just irritation on the surface.     You have a change in vision or loss of vision.     You have an increase in discharge from the eye.     Your eye has not started to improve or begins to get worse within 48 hours after you start using antibiotics.     Pinkeye lasts longer than 7 days.   Watch closely for changes in your health, and be sure to contact your doctor if you have any problems.  Where can you learn more?  Go to https://www.[employer].net/patiented  Enter Y392 in the search box to learn more about \"Pinkeye: Care Instructions.\"  Current as of: June 5, 2023               Content Version: 14.0    6928-4506 [employer].   Care instructions adapted under license by your healthcare professional. If you have questions about a medical condition or this instruction, always ask your healthcare professional. [employer] disclaims any warranty or liability for your use of this information.      "

## 2024-04-09 ENCOUNTER — TRANSFERRED RECORDS (OUTPATIENT)
Dept: HEALTH INFORMATION MANAGEMENT | Facility: CLINIC | Age: 44
End: 2024-04-09

## 2024-04-21 ENCOUNTER — HEALTH MAINTENANCE LETTER (OUTPATIENT)
Age: 44
End: 2024-04-21

## 2024-05-22 DIAGNOSIS — H93.8X1 EAR PRESSURE, RIGHT: Primary | ICD-10-CM

## 2024-05-28 ENCOUNTER — OFFICE VISIT (OUTPATIENT)
Dept: AUDIOLOGY | Facility: CLINIC | Age: 44
End: 2024-05-28
Payer: COMMERCIAL

## 2024-05-28 DIAGNOSIS — H90.41 SENSORINEURAL HEARING LOSS (SNHL) OF RIGHT EAR WITH UNRESTRICTED HEARING OF LEFT EAR: Primary | ICD-10-CM

## 2024-05-28 DIAGNOSIS — H69.91 DYSFUNCTION OF RIGHT EUSTACHIAN TUBE: Primary | ICD-10-CM

## 2024-05-28 PROCEDURE — 92550 TYMPANOMETRY & REFLEX THRESH: CPT | Performed by: AUDIOLOGIST

## 2024-05-28 PROCEDURE — 92557 COMPREHENSIVE HEARING TEST: CPT | Performed by: AUDIOLOGIST

## 2024-05-28 NOTE — PROGRESS NOTES
AUDIOLOGY REPORT    SUMMARY: Audiology visit completed. See audiogram for results.      RECOMMENDATIONS: Follow-up with ENT.    Annabel Mak, CCC-A  Clinical Audiologist  [place] #[id number]

## 2024-05-29 ENCOUNTER — PRE VISIT (OUTPATIENT)
Dept: OTOLARYNGOLOGY | Facility: CLINIC | Age: 44
End: 2024-05-29

## 2024-05-30 ENCOUNTER — TELEPHONE (OUTPATIENT)
Dept: OTOLARYNGOLOGY | Facility: CLINIC | Age: 44
End: 2024-05-30
Payer: COMMERCIAL

## 2024-05-30 PROBLEM — H69.91 DYSFUNCTION OF RIGHT EUSTACHIAN TUBE: Status: ACTIVE | Noted: 2024-05-28

## 2024-05-30 NOTE — TELEPHONE ENCOUNTER
Left message regarding scheduling surgery/procedure with Dr. Berg. Writer left call back number on the patients voicemail.      Pinky Rahman on 5/30/2024 at 1:27 PM   P: [phone]

## 2024-05-30 NOTE — TELEPHONE ENCOUNTER
Called patient to schedule surgery with Dr. Berg. Offered sooner surgery dates, patient declined June and July.     Date of Surgery: 7/12/2024    Approximate arrival time given:  Yes - AM     Location of surgery: [facility]    Pre-Op H&P: PCP - MAYCOL Handy, 30 days     Post-Op Appt Date w/ Surgeon: 6 week post op, scheduled 8/26/2024 at 930am       Patient aware that pre-op RN will call 2-3 days prior to surgery with arrival time and instructions Yes    Packet sent out: Yes, Mychart 05/30/24       Additional Comments: All patients questions were answered and was instructed to review surgical packet and call back with any questions or concerns.       Pinky Rahman on 5/30/2024 at 3:15 PM

## 2024-06-04 ENCOUNTER — ANCILLARY PROCEDURE (OUTPATIENT)
Dept: ULTRASOUND IMAGING | Facility: CLINIC | Age: 44
End: 2024-06-04
Attending: OBSTETRICS & GYNECOLOGY
Payer: COMMERCIAL

## 2024-06-04 DIAGNOSIS — N83.202 LEFT OVARIAN CYST: ICD-10-CM

## 2024-06-04 PROCEDURE — 76830 TRANSVAGINAL US NON-OB: CPT | Performed by: OBSTETRICS & GYNECOLOGY

## 2024-06-16 ENCOUNTER — MYC MEDICAL ADVICE (OUTPATIENT)
Dept: FAMILY MEDICINE | Facility: CLINIC | Age: 44
End: 2024-06-16
Payer: COMMERCIAL

## 2024-06-16 DIAGNOSIS — N63.10 MASS OF RIGHT BREAST, UNSPECIFIED QUADRANT: Primary | ICD-10-CM

## 2024-06-16 DIAGNOSIS — N64.4 BREAST PAIN, RIGHT: ICD-10-CM

## 2024-06-17 NOTE — TELEPHONE ENCOUNTER
Dr. Lundy/DOD-     Pended diagnostic mammo and ultrasound of R breast d/t symptoms reported in message    ASHLEY WyattN RN  [facility]

## 2024-06-17 NOTE — TELEPHONE ENCOUNTER
Would recommend following up with clinic visit. Can discuss it with provider during visit on July 1st.    Flaco Cool, DO

## 2024-07-02 ENCOUNTER — VIRTUAL VISIT (OUTPATIENT)
Dept: FAMILY MEDICINE | Facility: CLINIC | Age: 44
End: 2024-07-02
Payer: COMMERCIAL

## 2024-07-02 DIAGNOSIS — Z98.1 HISTORY OF FUSION OF CERVICAL SPINE: ICD-10-CM

## 2024-07-02 DIAGNOSIS — M54.2 NECK PAIN: Primary | ICD-10-CM

## 2024-07-02 DIAGNOSIS — M50.30 DDD (DEGENERATIVE DISC DISEASE), CERVICAL: ICD-10-CM

## 2024-07-02 PROCEDURE — 99213 OFFICE O/P EST LOW 20 MIN: CPT | Mod: 95 | Performed by: FAMILY MEDICINE

## 2024-07-02 ASSESSMENT — ENCOUNTER SYMPTOMS: BACK PAIN: 1

## 2024-07-02 NOTE — PROGRESS NOTES
Allyn is a 43 year old who is being evaluated via a billable video visit.    How would you like to obtain your AVS? MyChart  If the video visit is dropped, the invitation should be resent by: Text to cell phone: [phone]  Will anyone else be joining your video visit? No      Assessment & Plan     (M54.2) Neck pain  (primary encounter diagnosis)  (M50.30) DDD (degenerative disc disease), cervical  (Z98.1) History of fusion of cervical spine  Comment: patient managing well with over the counter pain medication and stretching/strength exercises, but given history I do think it's important to establish with a specialist to have more options in future if symptoms worsen. Referred to medical spine specialist.  Plan: Spine  Referral          Subjective   Allyn is a 43 year old, presenting for the following health issues:  Back and neck pain  Many years of neck and back pain with history of scoliosis and cervical fusion age 13.  She is currently have more acutely severe neck pain without bony tenderness. She does yoga regularly which does help her neck, back and shoulders. She takes ibuprofen as needed. She denies muscle spasm pain.    Her chiropracter has done an xray and noted DDD of cervical spine. She is doing strengthening exercises. She's had C3-C4 (?) fusion done 2006    Back Pain (Patient stated that have back and neck pain for the last couple month. Patient stated that was seen by chiropractor with mild success. Was getting treatment for couple months. Stated that xray was done about a month ago. )      7/2/2024    12:31 PM   Additional Questions   Roomed by MICHEL Catherine   Accompanied by self         7/2/2024    12:31 PM   Patient Reported Additional Medications   Patient reports taking the following new medications none     She possibly has covid, just came home from trip with URI symptoms.    Back Pain     History of Present Illness       Back Pain:  She presents for follow up of back pain.  "Patient's back pain is a recurring problem.  Location of back pain:  Right upper back, left upper back, right side of neck and left side of neck  Description of back pain: dull ache  Back pain spreads: nowhere    Since patient first noticed back pain, pain is: always present, but gets better and worse  Does back pain interfere with her job:  No       She eats 4 or more servings of fruits and vegetables daily.She consumes 0 sweetened beverage(s) daily.She exercises with enough effort to increase her heart rate 30 to 60 minutes per day.  She exercises with enough effort to increase her heart rate 5 days per week.   She is taking medications regularly.       Review of Systems  Constitutional, HEENT, cardiovascular, pulmonary, gi and gu systems are negative, except as otherwise noted.      Objective    Vitals - Patient Reported  Weight (Patient Reported): 65.8 kg (145 lb)  Height (Patient Reported): 162.6 cm (5' 4\")  BMI (Based on Pt Reported Ht/Wt): 24.89  Pain Score: Mild Pain (3)  Pain Loc:  (neck and upper back)        Physical Exam   GENERAL: alert and no distress  EYES: Eyes grossly normal to inspection.  No discharge or erythema, or obvious scleral/conjunctival abnormalities.  RESP: No audible wheeze, cough, or visible cyanosis.    SKIN: Visible skin clear. No significant rash, abnormal pigmentation or lesions.  NEURO: Cranial nerves grossly intact.  Mentation and speech appropriate for age.  PSYCH: Appropriate affect, tone, and pace of words        Video-Visit Details    Type of service:  Video Visit   Originating Location (pt. Location): Home    Distant Location (provider location):  Off-site  Platform used for Video Visit: Simi  Signed Electronically by: Noelle Lundy MD    "

## 2024-07-05 ENCOUNTER — TELEPHONE (OUTPATIENT)
Dept: OTOLARYNGOLOGY | Facility: CLINIC | Age: 44
End: 2024-07-05
Payer: COMMERCIAL

## 2024-07-05 NOTE — TELEPHONE ENCOUNTER
Patient confirmed scheduled appointment:  Date: 8/29  Time: 1015  Provider: Otis  Location: [facility]   Testing/imaging:   Additional notes:

## 2024-07-11 ENCOUNTER — PATIENT OUTREACH (OUTPATIENT)
Dept: CARE COORDINATION | Facility: CLINIC | Age: 44
End: 2024-07-11

## 2024-07-11 ENCOUNTER — OFFICE VISIT (OUTPATIENT)
Dept: PEDIATRICS | Facility: CLINIC | Age: 44
End: 2024-07-11
Payer: COMMERCIAL

## 2024-07-11 ENCOUNTER — ANESTHESIA EVENT (OUTPATIENT)
Dept: SURGERY | Facility: AMBULATORY SURGERY CENTER | Age: 44
End: 2024-07-11
Payer: COMMERCIAL

## 2024-07-11 VITALS
RESPIRATION RATE: 14 BRPM | DIASTOLIC BLOOD PRESSURE: 70 MMHG | SYSTOLIC BLOOD PRESSURE: 106 MMHG | BODY MASS INDEX: 25.18 KG/M2 | HEART RATE: 74 BPM | TEMPERATURE: 98.2 F | WEIGHT: 147.5 LBS | HEIGHT: 64 IN | OXYGEN SATURATION: 96 %

## 2024-07-11 DIAGNOSIS — Z01.818 PREOP GENERAL PHYSICAL EXAM: Primary | ICD-10-CM

## 2024-07-11 DIAGNOSIS — Z98.1 HISTORY OF FUSION OF CERVICAL SPINE: ICD-10-CM

## 2024-07-11 DIAGNOSIS — H66.90 RECURRENT ACUTE OTITIS MEDIA: ICD-10-CM

## 2024-07-11 PROCEDURE — 99214 OFFICE O/P EST MOD 30 MIN: CPT | Performed by: PHYSICIAN ASSISTANT

## 2024-07-11 RX ORDER — HYDROMORPHONE HYDROCHLORIDE 1 MG/ML
0.4 INJECTION, SOLUTION INTRAMUSCULAR; INTRAVENOUS; SUBCUTANEOUS EVERY 5 MIN PRN
Status: CANCELLED | OUTPATIENT
Start: 2024-07-11

## 2024-07-11 RX ORDER — NALOXONE HYDROCHLORIDE 0.4 MG/ML
0.1 INJECTION, SOLUTION INTRAMUSCULAR; INTRAVENOUS; SUBCUTANEOUS
Status: CANCELLED | OUTPATIENT
Start: 2024-07-11

## 2024-07-11 RX ORDER — OXYCODONE HYDROCHLORIDE 5 MG/1
5 TABLET ORAL
Status: CANCELLED | OUTPATIENT
Start: 2024-07-11

## 2024-07-11 RX ORDER — DEXAMETHASONE SODIUM PHOSPHATE 10 MG/ML
4 INJECTION, SOLUTION INTRAMUSCULAR; INTRAVENOUS
Status: CANCELLED | OUTPATIENT
Start: 2024-07-11

## 2024-07-11 RX ORDER — FENTANYL CITRATE 50 UG/ML
25 INJECTION, SOLUTION INTRAMUSCULAR; INTRAVENOUS EVERY 5 MIN PRN
Status: CANCELLED | OUTPATIENT
Start: 2024-07-11

## 2024-07-11 RX ORDER — ONDANSETRON 2 MG/ML
4 INJECTION INTRAMUSCULAR; INTRAVENOUS EVERY 30 MIN PRN
Status: CANCELLED | OUTPATIENT
Start: 2024-07-11

## 2024-07-11 RX ORDER — ONDANSETRON 4 MG/1
4 TABLET, ORALLY DISINTEGRATING ORAL EVERY 30 MIN PRN
Status: CANCELLED | OUTPATIENT
Start: 2024-07-11

## 2024-07-11 RX ORDER — HYDROMORPHONE HYDROCHLORIDE 1 MG/ML
0.2 INJECTION, SOLUTION INTRAMUSCULAR; INTRAVENOUS; SUBCUTANEOUS EVERY 5 MIN PRN
Status: CANCELLED | OUTPATIENT
Start: 2024-07-11

## 2024-07-11 RX ORDER — OXYCODONE HYDROCHLORIDE 5 MG/1
10 TABLET ORAL
Status: CANCELLED | OUTPATIENT
Start: 2024-07-11

## 2024-07-11 RX ORDER — FENTANYL CITRATE 50 UG/ML
50 INJECTION, SOLUTION INTRAMUSCULAR; INTRAVENOUS EVERY 5 MIN PRN
Status: CANCELLED | OUTPATIENT
Start: 2024-07-11

## 2024-07-11 RX ORDER — SODIUM CHLORIDE, SODIUM LACTATE, POTASSIUM CHLORIDE, CALCIUM CHLORIDE 600; 310; 30; 20 MG/100ML; MG/100ML; MG/100ML; MG/100ML
INJECTION, SOLUTION INTRAVENOUS CONTINUOUS
Status: CANCELLED | OUTPATIENT
Start: 2024-07-11

## 2024-07-11 RX ORDER — FENTANYL CITRATE 50 UG/ML
25 INJECTION, SOLUTION INTRAMUSCULAR; INTRAVENOUS
Status: CANCELLED | OUTPATIENT
Start: 2024-07-11

## 2024-07-11 ASSESSMENT — PAIN SCALES - GENERAL: PAINLEVEL: NO PAIN (0)

## 2024-07-11 NOTE — ANESTHESIA PREPROCEDURE EVALUATION
Anesthesia Pre-Procedure Evaluation    Patient: Allyn Mcqueen   MRN: [id number] : 1980        Procedure : Procedure(s):  COMBINED RIGHT MYRINGOTOMY, INSERT TUBE          Past Medical History:   Diagnosis Date     Anxiety     worse post-partum     Esophageal reflux 2018    started during pregnancy; got worse year later     Ovarian cyst rupture 2010     Palpitations     off and on for years; more off now     Recurrent otitis media      Stomach problems 2018    started post-pregnancy in 2018      Past Surgical History:   Procedure Laterality Date     HEAD & NECK SURGERY       ZZC SPINAL FUSION,ANT,EA ADNL LEVEL  2006    fused cervical vertebrae ?c3-4      Allergies   Allergen Reactions     Adhesive Tape Rash     Amoxicillin Rash     Cefixime Rash     Latex Rash      Social History     Tobacco Use     Smoking status: Never     Passive exposure: Never     Smokeless tobacco: Never   Substance Use Topics     Alcohol use: Yes     Comment: occ      Wt Readings from Last 1 Encounters:   24 66.9 kg (147 lb 8 oz)        Anesthesia Evaluation   Pt has had prior anesthetic. Type: General and Regional.        ROS/MED HX  ENT/Pulmonary: Comment: Recurring middle ear infections      Neurologic:  - neg neurologic ROS     Cardiovascular:  - neg cardiovascular ROS     METS/Exercise Tolerance: >4 METS    Hematologic:  - neg hematologic  ROS     Musculoskeletal:  - neg musculoskeletal ROS     GI/Hepatic:     (+) GERD, Asymptomatic on medication,                  Renal/Genitourinary:  - neg Renal ROS     Endo:  - neg endo ROS     Psychiatric/Substance Use:  - neg psychiatric ROS     Infectious Disease:  - neg infectious disease ROS     Malignancy:  - neg malignancy ROS     Other:  - neg other ROS          Physical Exam    Airway        Mallampati: II   TM distance: > 3 FB   Neck ROM: full   Mouth opening: > 3 cm    Respiratory Devices and Support         Dental       (+) Minor Abnormalities - some fillings, tiny  "chips    B=Bridge, C=Chipped, L=Loose, M=Missing    Cardiovascular   cardiovascular exam normal       Rhythm and rate: regular and normal     Pulmonary   pulmonary exam normal        breath sounds clear to auscultation       OUTSIDE LABS:  CBC:   Lab Results   Component Value Date    WBC 10.1 02/17/2018    WBC 6.3 07/07/2017    HGB 8.5 (L) 02/19/2018    HGB 12.6 02/17/2018    HCT 37.8 02/17/2018    HCT 35.2 07/07/2017     02/17/2018     07/07/2017     BMP:   Lab Results   Component Value Date    GLC 79 04/19/2010     COAGS: No results found for: \"PTT\", \"INR\", \"FIBR\"  POC:   Lab Results   Component Value Date    HCG Negative 08/18/2014     HEPATIC: No results found for: \"ALBUMIN\", \"PROTTOTAL\", \"ALT\", \"AST\", \"GGT\", \"ALKPHOS\", \"BILITOTAL\", \"BILIDIRECT\", \"NICOLE\"  OTHER:   Lab Results   Component Value Date    TSH 1.52 05/30/2013       Anesthesia Plan    ASA Status:  1    NPO Status:  NPO Appropriate    Anesthesia Type: MAC.     - Reason for MAC: straight local not clinically adequate, immobility needed              Consents    Anesthesia Plan(s) and associated risks, benefits, and realistic alternatives discussed. Questions answered and patient/representative(s) expressed understanding.     - Discussed:     - Discussed with:  Patient            Postoperative Care       PONV prophylaxis: Ondansetron (or other 5HT-3), Dexamethasone or Solumedrol, Background Propofol Infusion     Comments:             Latrice Em MD    I have reviewed the pertinent notes and labs in the chart from the past 30 days and (re)examined the patient.  Any updates or changes from those notes are reflected in this note.              # Overweight: Estimated body mass index is 25.65 kg/m  as calculated from the following:    Height as of 7/11/24: 1.615 m (5' 3.58\").    Weight as of 7/11/24: 66.9 kg (147 lb 8 oz).      "

## 2024-07-11 NOTE — PROGRESS NOTES
Preoperative Evaluation  [facility]  [address]  [address]  [place] [address]  Phone: [phone]  Fax: [phone]  Primary Provider: Noelle Lundy MD  Pre-op Performing Provider: Nikia Joseph PA-C  Jul 11, 2024 7/11/2024   Surgical Information   What procedure is being done? Inserting ear tube   Facility or Hospital where procedure/surgery will be performed: [facility] at [address] [place] [address]   Who is doing the procedure / surgery? Emeli Berg   Date of surgery / procedure: July 12th   Time of surgery / procedure: 12:30   Where do you plan to recover after surgery? at home with family        Fax number for surgical facility: Note does not need to be faxed, will be available electronically in Epic.    Assessment & Plan     The proposed surgical procedure is considered LOW risk.    Preop general physical exam  No labs or ekg    Recurrent acute otitis media/ETD  Reason for procedure    History of fusion of cervical spine  Caution with extension of neck              - No identified additional risk factors other than previously addressed    Antiplatelet or Anticoagulation Medication Instructions   - Patient is on no antiplatelet or anticoagulation medications.    Additional Medication Instructions  Take all scheduled medications on the day of surgery    Recommendation  Approval given to proceed with proposed procedure, without further diagnostic evaluation.    Kailyn Gruber is a 43 year old, presenting for the following:  Pre-Op Exam          7/11/2024     2:12 PM   Additional Questions   Roomed by RADHA Wallace   Accompanied by GAETANO         7/11/2024     2:12 PM   Patient Reported Additional Medications   Patient reports taking the following new medications No     HPI related to upcoming procedure: right ear PE tube        7/11/2024   Pre-Op Questionnaire   Have you ever had a heart attack or stroke? No   Have you ever had surgery on your heart or  blood vessels, such as a stent placement, a coronary artery bypass, or surgery on an artery in your head, neck, heart, or legs? No   Do you have chest pain with activity? No   Do you have a history of heart failure? No   Do you currently have a cold, bronchitis or symptoms of other infection? No   Do you have a cough, shortness of breath, or wheezing? No   Do you or anyone in your family have previous history of blood clots? No   Do you or does anyone in your family have a serious bleeding problem such as prolonged bleeding following surgeries or cuts? No   Have you ever had problems with anemia or been told to take iron pills? (!) YES 2012   Have you had any abnormal blood loss such as black, tarry or bloody stools, or abnormal vaginal bleeding? No   Have you ever had a blood transfusion? No   Are you willing to have a blood transfusion if it is medically needed before, during, or after your surgery? YES per pt   Have you or any of your relatives ever had problems with anesthesia? No   Do you have sleep apnea, excessive snoring or daytime drowsiness? No   Do you have any artifical heart valves or other implanted medical devices like a pacemaker, defibrillator, or continuous glucose monitor? No   Do you have artificial joints? No   Are you allergic to latex? No        Health Care Directive  Patient does not have a Health Care Directive or Living Will: Discussed advance care planning with patient; however, patient declined at this time.    Preoperative Review of    reviewed - no record of controlled substances prescribed.          Patient Active Problem List    Diagnosis Date Noted    Neck pain 07/02/2024     Priority: Medium    DDD (degenerative disc disease), cervical 07/02/2024     Priority: Medium    History of fusion of cervical spine 07/02/2024     Priority: Medium    Dysfunction of right eustachian tube 05/28/2024     Priority: Medium    Mastoiditis, acute, right 04/20/2023     Priority: Medium     Allergic rhinitis 11/21/2022     Priority: Medium    Recurrent acute otitis media 11/21/2022     Priority: Medium    Anxiety 10/21/2022     Priority: Medium    Type O blood, Rh positive 01/21/2022     Priority: Medium    Juvenile idiopathic scoliosis, unspecified spinal region 01/21/2022     Priority: Medium    Gastroesophageal reflux disease without esophagitis 11/13/2019     Priority: Medium    CARDIOVASCULAR SCREENING; LDL GOAL LESS THAN 160 10/31/2010     Priority: Medium      Past Medical History:   Diagnosis Date    Anxiety     worse post-partum    Esophageal reflux 2018    started during pregnancy; got worse year later    Ovarian cyst rupture 04/2010    Palpitations     off and on for years; more off now    Recurrent otitis media     Stomach problems 2018    started post-pregnancy in 2018     Past Surgical History:   Procedure Laterality Date    HEAD & NECK SURGERY      ZZC SPINAL FUSION,ANT,EA ADNL LEVEL  2006    fused cervical vertebrae ?c3-4     Current Outpatient Medications   Medication Sig Dispense Refill    citalopram (CELEXA) 10 MG tablet TAKE 1 TABLET (10 MG) BY MOUTH DAILY 90 tablet 2    fluticasone (FLONASE) 50 MCG/ACT nasal spray Spray 2 sprays into both nostrils 2 times daily For 2 months then 2 sprays daily after 18.2 mL 8    levonorgestrel (MIRENA) 20 MCG/24HR IUD 1 each by Intrauterine route once      omeprazole (PRILOSEC) 20 MG DR capsule Take 1 capsule by mouth every 24 hours         Allergies   Allergen Reactions    Adhesive Tape Rash    Amoxicillin Rash    Cefixime Rash    Latex Rash        Social History     Tobacco Use    Smoking status: Never     Passive exposure: Never    Smokeless tobacco: Never   Substance Use Topics    Alcohol use: Yes     Comment: occ       History   Drug Use No             Review of Systems  CONSTITUTIONAL: NEGATIVE for fever, chills, change in weight  INTEGUMENTARY/SKIN: NEGATIVE for worrisome rashes, moles or lesions  EYES: NEGATIVE for vision changes or  "irritation  ENT/MOUTH: right ear ETD  RESP: NEGATIVE for significant cough or SOB  BREAST: NEGATIVE for masses, tenderness or discharge  CV: NEGATIVE for chest pain, palpitations or peripheral edema  GI: NEGATIVE for nausea, abdominal pain, heartburn, or change in bowel habits  : NEGATIVE for frequency, dysuria, or hematuria  MUSCULOSKELETAL: NEGATIVE for significant arthralgias or myalgia  NEURO: NEGATIVE for weakness, dizziness or paresthesias  ENDOCRINE: NEGATIVE for temperature intolerance, skin/hair changes  HEME: NEGATIVE for bleeding problems  PSYCHIATRIC: NEGATIVE for changes in mood or affect    Objective    /70 (BP Location: Right arm, Patient Position: Sitting, Cuff Size: Adult Regular)   Pulse 74   Temp 98.2  F (36.8  C) (Oral)   Resp 14   Ht 1.615 m (5' 3.58\")   Wt 66.9 kg (147 lb 8 oz)   LMP  (LMP Unknown)   SpO2 96%   BMI 25.65 kg/m     Estimated body mass index is 25.65 kg/m  as calculated from the following:    Height as of this encounter: 1.615 m (5' 3.58\").    Weight as of this encounter: 66.9 kg (147 lb 8 oz).  Physical Exam  GENERAL: alert and no distress  EYES: Eyes grossly normal to inspection, PERRL and conjunctivae and sclerae normal  HENT: normal cephalic/atraumatic, right ear: right TM few fluid bubbles, nose and mouth without ulcers or lesions, oropharynx clear, and oral mucous membranes moist  NECK: no adenopathy, no asymmetry, masses, or scars  RESP: lungs clear to auscultation - no rales, rhonchi or wheezes  CV: regular rate and rhythm, normal S1 S2, no S3 or S4, no murmur, click or rub, no peripheral edema  ABDOMEN: soft, nontender, no hepatosplenomegaly, no masses and bowel sounds normal  MS: no gross musculoskeletal defects noted, no edema  SKIN: no suspicious lesions or rashes  NEURO: Normal strength and tone, mentation intact and speech normal  PSYCH: mentation appears normal, affect normal/bright    No results for input(s): \"HGB\", \"PLT\", \"INR\", \"NA\", \"POTASSIUM\", " "\"CR\", \"A1C\" in the last 8760 hours.     Diagnostics  No labs were ordered during this visit.   No EKG required for low risk surgery (cataract, skin procedure, breast biopsy, etc).    Revised Cardiac Risk Index (RCRI)  The patient has the following serious cardiovascular risks for perioperative complications:   - No serious cardiac risks = 0 points     RCRI Interpretation: 0 points: Class I (very low risk - 0.4% complication rate)         Signed Electronically by: Nikia Joseph PA-C  Copy of this evaluation report is provided to requesting physician.         "

## 2024-07-11 NOTE — PATIENT INSTRUCTIONS

## 2024-07-12 ENCOUNTER — HOSPITAL ENCOUNTER (OUTPATIENT)
Facility: AMBULATORY SURGERY CENTER | Age: 44
Discharge: HOME | End: 2024-07-12
Attending: OTOLARYNGOLOGY
Payer: COMMERCIAL

## 2024-07-12 ENCOUNTER — ANESTHESIA (OUTPATIENT)
Dept: SURGERY | Facility: AMBULATORY SURGERY CENTER | Age: 44
End: 2024-07-12
Payer: COMMERCIAL

## 2024-07-12 VITALS
SYSTOLIC BLOOD PRESSURE: 103 MMHG | TEMPERATURE: 98.5 F | DIASTOLIC BLOOD PRESSURE: 72 MMHG | RESPIRATION RATE: 16 BRPM | OXYGEN SATURATION: 99 % | BODY MASS INDEX: 24.75 KG/M2 | WEIGHT: 145 LBS | HEIGHT: 64 IN | HEART RATE: 56 BPM

## 2024-07-12 LAB
HCG UR QL: NEGATIVE
HCG UR QL: NEGATIVE
INTERNAL QC OK POCT: NORMAL
INTERNAL QC OK POCT: NORMAL
POCT KIT EXPIRATION DATE: NORMAL
POCT KIT EXPIRATION DATE: NORMAL
POCT KIT LOT NUMBER: NORMAL
POCT KIT LOT NUMBER: NORMAL

## 2024-07-12 PROCEDURE — 69436 CREATE EARDRUM OPENING: CPT | Performed by: ANESTHESIOLOGY

## 2024-07-12 PROCEDURE — 69436 CREATE EARDRUM OPENING: CPT | Performed by: NURSE ANESTHETIST, CERTIFIED REGISTERED

## 2024-07-12 PROCEDURE — 81025 URINE PREGNANCY TEST: CPT | Performed by: PATHOLOGY

## 2024-07-12 PROCEDURE — 69436 CREATE EARDRUM OPENING: CPT | Mod: RT

## 2024-07-12 RX ORDER — PROPOFOL 10 MG/ML
INJECTION, EMULSION INTRAVENOUS CONTINUOUS PRN
Status: DISCONTINUED | OUTPATIENT
Start: 2024-07-12 | End: 2024-07-12

## 2024-07-12 RX ORDER — FENTANYL CITRATE 50 UG/ML
INJECTION, SOLUTION INTRAMUSCULAR; INTRAVENOUS PRN
Status: DISCONTINUED | OUTPATIENT
Start: 2024-07-12 | End: 2024-07-12

## 2024-07-12 RX ORDER — PROPOFOL 10 MG/ML
INJECTION, EMULSION INTRAVENOUS PRN
Status: DISCONTINUED | OUTPATIENT
Start: 2024-07-12 | End: 2024-07-12

## 2024-07-12 RX ORDER — LIDOCAINE HYDROCHLORIDE 20 MG/ML
INJECTION, SOLUTION INFILTRATION; PERINEURAL PRN
Status: DISCONTINUED | OUTPATIENT
Start: 2024-07-12 | End: 2024-07-12

## 2024-07-12 RX ORDER — SODIUM CHLORIDE, SODIUM LACTATE, POTASSIUM CHLORIDE, CALCIUM CHLORIDE 600; 310; 30; 20 MG/100ML; MG/100ML; MG/100ML; MG/100ML
INJECTION, SOLUTION INTRAVENOUS CONTINUOUS
Status: DISCONTINUED | OUTPATIENT
Start: 2024-07-12 | End: 2024-07-13 | Stop reason: HOSPADM

## 2024-07-12 RX ORDER — ACETAMINOPHEN 325 MG/1
975 TABLET ORAL ONCE
Status: COMPLETED | OUTPATIENT
Start: 2024-07-12 | End: 2024-07-12

## 2024-07-12 RX ORDER — ACETAMINOPHEN 325 MG/1
650 TABLET ORAL
Status: CANCELLED | OUTPATIENT
Start: 2024-07-12

## 2024-07-12 RX ORDER — LIDOCAINE 40 MG/G
CREAM TOPICAL
Status: DISCONTINUED | OUTPATIENT
Start: 2024-07-12 | End: 2024-07-13 | Stop reason: HOSPADM

## 2024-07-12 RX ORDER — GABAPENTIN 300 MG/1
300 CAPSULE ORAL
Status: COMPLETED | OUTPATIENT
Start: 2024-07-12 | End: 2024-07-12

## 2024-07-12 RX ORDER — OFLOXACIN 3 MG/ML
SOLUTION AURICULAR (OTIC) PRN
Status: DISCONTINUED | OUTPATIENT
Start: 2024-07-12 | End: 2024-07-12 | Stop reason: HOSPADM

## 2024-07-12 RX ADMIN — PROPOFOL 200 MCG/KG/MIN: 10 INJECTION, EMULSION INTRAVENOUS at 12:25

## 2024-07-12 RX ADMIN — SODIUM CHLORIDE, SODIUM LACTATE, POTASSIUM CHLORIDE, CALCIUM CHLORIDE: 600; 310; 30; 20 INJECTION, SOLUTION INTRAVENOUS at 11:54

## 2024-07-12 RX ADMIN — GABAPENTIN 300 MG: 300 CAPSULE ORAL at 11:52

## 2024-07-12 RX ADMIN — ACETAMINOPHEN 975 MG: 325 TABLET ORAL at 11:52

## 2024-07-12 RX ADMIN — PROPOFOL 40 MG: 10 INJECTION, EMULSION INTRAVENOUS at 12:25

## 2024-07-12 RX ADMIN — FENTANYL CITRATE 50 MCG: 50 INJECTION, SOLUTION INTRAMUSCULAR; INTRAVENOUS at 12:23

## 2024-07-12 RX ADMIN — PROPOFOL 20 MG: 10 INJECTION, EMULSION INTRAVENOUS at 12:31

## 2024-07-12 RX ADMIN — LIDOCAINE HYDROCHLORIDE 75 MG: 20 INJECTION, SOLUTION INFILTRATION; PERINEURAL at 12:25

## 2024-07-12 NOTE — DISCHARGE INSTRUCTIONS
1.  Dry ear precautions for 1 week after surgery.    2.  After 1 week, may put head underwater without ear plugs if the water is chlorinated.  Otherwise, keep ears dry with ear plugs/headband.    3.  Floxin drops 5 drops twice a day to the right ears for 3 days.    4.  Tylenol as needed for pain.    5.  Follow up as scheduled.     6.  If you have any questions, please call the ENT clinic during daytime hours or call the  and ask for the ENT resident on call during evening/weekend hours.     [facility]  Home Care Following Anesthesia  For 24 hours after surgery:  Get plenty of rest.  A responsible adult must stay with you for at least 24 hours after you leave the surgery center.  Do not drive or use heavy equipment.  If you have weakness or tingling, don't drive or use heavy equipment until this feeling goes away.   Do not drink alcohol.   Avoid strenuous or risky activities.  Ask for help when climbing stairs.  You may feel lightheaded.  IF so, sit for a few minutes before standing.  Have someone help you get up.   If you have nausea (feel sick to your stomach): Drink only clear liquids such as apple juice, ginger ale, broth or 7-Up.  Rest may also help.  Be sure to drink enough fluids.  Move to a regular diet as you feel able.   You may have a slight fever.  Call the doctor if your fever is over 100 F (37.7 C) (taken under the tongue) or lasts longer than 24 hours.  You may have a dry mouth, a sore throat, muscle aches or trouble sleeping. These should go away after 24 hours.  Do not make important or legal decisions.   It is recommended to avoid smoking.               Tips for taking pain medications  To get the best pain relief possible, remember these points:  Take pain medications as directed, before pain becomes severe.  Pain medication can upset your stomach: taking it with food may help.  Constipation is a common side effect of pain medication. Drink plenty of   fluids.  Eat foods high in fiber. Take a stool softener if recommended by your doctor or pharmacist.  Do not drink alcohol, drive or operate machinery while taking pain medications.  Ask about other ways to control pain, such as with heat, ice or relaxation.    Tylenol/Acetaminophen Consumption    If you feel your pain relief is insufficient, you may take Tylenol/Acetaminophen in addition to your narcotic pain medication.   Be careful not to exceed 4,000 mg of Tylenol/Acetaminophen in a 24 hour period from all sources.  If you are taking extra strength Tylenol/acetaminophen (500 mg), the maximum dose is 8 tablets in 24 hours.  If you are taking regular strength acetaminophen (325 mg), the maximum dose is 12 tablets in 24 hours.  Tylenol 975 mg given at 12:00 pm.   Ok to take more after 6:00 pm.      Call a doctor for any of the following:  Signs of infection (fever, growing tenderness at the surgery site, a large amount of drainage or bleeding, severe pain, foul-smelling drainage, redness, swelling).  It has been over 8 to 10 hours since surgery and you are still not able to urinate (pass water).  Headache for over 24 hours.  Numbness, tingling or weakness the day after surgery (if you had spinal anesthesia).  Signs of Covid-19 infection (temperature over 100 degrees, shortness of breath, cough, loss of taste/smell, generalized body aches, persistent headache, chills, sore throat, nausea/vomiting/diarrhea)  Your doctor is:  Dr. Emeli Berg, ENT Otolaryngology: [phone]                    Or dial [phone] and ask for the resident on call for:  ENT Otolaryngology  For emergency care, call the:  [facility]:  [phone] (TTY for hearing impaired: [phone])

## 2024-07-12 NOTE — OP NOTE
Date of service:  7/12/24    Preoperative diagnosis:  Otitis media with effusion    Postoperative diagnosis:  eustachian tube dysfunction     Procedure:  Right myringotomy and t-tube placement    Surgeon:  Emeli Berg MD    Anesthesia:  General    Complications:  None    EBL:  None    Specimens:  None    Findings:  Right with no effusion.     Indications:  Allyn Mcqueen is a 43 year old female with otitis media with effusion.  The above procedure was discussed with the parents and consent was obtained.    Procedure:  Patient was taken to the operating room and placed supine on the operating table.  After mask anesthesia was performed, Time Out was then performed with confirmation of the patient, site and procedure.  The operating microscope was brought into position and the right ear was examined.  Cerumen was removed.  The tympanic membrane was intact.  Myringotomy incision was made with return of no effusion.  T- tube was placed without difficulty and Floxin drops instilled into the ear canal.  The patient tolerated the procedure well with no complications.  Awakened and taken to the PACU in stable condition.

## 2024-07-12 NOTE — BRIEF OP NOTE
[facility]    Brief Operative Note    Pre-operative diagnosis: Dysfunction of right eustachian tube [H69.91]  Post-operative diagnosis Same as pre-operative diagnosis    Procedure: COMBINED RIGHT MYRINGOTOMY, INSERT TUBE, Right - Ear    Surgeon: Surgeons and Role:     * Emeli Berg MD - Primary  Anesthesia: MAC   Estimated Blood Loss: None    Drains: None  Specimens: * No specimens in log *  Findings:   Well-aerated middle ear. T-tube placed without issue .  Complications: None.  Implants: * No implants in log *

## 2024-07-12 NOTE — ANESTHESIA POSTPROCEDURE EVALUATION
Patient: Allyn Mcqueen    Procedure: Procedure(s):  COMBINED RIGHT MYRINGOTOMY, INSERT TUBE       Anesthesia Type:  MAC    Note:  Disposition: Outpatient   Postop Pain Control: Uneventful            Sign Out: Well controlled pain   PONV: No   Neuro/Psych: Uneventful            Sign Out: Acceptable/Baseline neuro status   Airway/Respiratory: Uneventful            Sign Out: Acceptable/Baseline resp. status   CV/Hemodynamics: Uneventful            Sign Out: Acceptable CV status; No obvious hypovolemia; No obvious fluid overload   Other NRE: NONE   DID A NON-ROUTINE EVENT OCCUR? No       Last vitals:  Vitals Value Taken Time   BP 99/68 07/12/24 1240   Temp 37.1  C (98.8  F) 07/12/24 1240   Pulse 57 07/12/24 1240   Resp 16 07/12/24 1240   SpO2 98 % 07/12/24 1240       Electronically Signed By: Latrice Em MD  July 12, 2024  1:09 PM

## 2024-07-12 NOTE — ANESTHESIA CARE TRANSFER NOTE
Patient: Allyn Mcqueen    Procedure: Procedure(s):  COMBINED RIGHT MYRINGOTOMY, INSERT TUBE       Diagnosis: Dysfunction of right eustachian tube [H69.91]  Diagnosis Additional Information: No value filed.    Anesthesia Type:   MAC     Note:    Oropharynx: spontaneously breathing  Level of Consciousness: drowsy and awake  Oxygen Supplementation: room air    Independent Airway: airway patency satisfactory and stable  Dentition: dentition unchanged  Vital Signs Stable: post-procedure vital signs reviewed and stable  Report to RN Given: handoff report given  Patient transferred to: Phase II    Handoff Report: Identifed the Patient, Identified the Reponsible Provider, Reviewed the pertinent medical history, Discussed the surgical course, Reviewed Intra-OP anesthesia mangement and issues during anesthesia, Set expectations for post-procedure period and Allowed opportunity for questions and acknowledgement of understanding  Vitals:  Vitals Value Taken Time   BP 99/68 07/12/24 1240   Temp 37.1  C (98.8  F) 07/12/24 1240   Pulse 57 07/12/24 1240   Resp 16 07/12/24 1240   SpO2 98 % 07/12/24 1240       Electronically Signed By: PAT Grimes CRNA  July 12, 2024  12:45 PM

## 2024-07-22 ENCOUNTER — ANCILLARY PROCEDURE (OUTPATIENT)
Dept: MAMMOGRAPHY | Facility: CLINIC | Age: 44
End: 2024-07-22
Attending: OBSTETRICS & GYNECOLOGY
Payer: COMMERCIAL

## 2024-07-22 DIAGNOSIS — N63.10 MASS OF RIGHT BREAST, UNSPECIFIED QUADRANT: ICD-10-CM

## 2024-07-22 DIAGNOSIS — N63.10 MASS OF RIGHT BREAST: ICD-10-CM

## 2024-07-22 PROCEDURE — G0279 TOMOSYNTHESIS, MAMMO: HCPCS | Performed by: RADIOLOGY

## 2024-07-22 PROCEDURE — 76642 ULTRASOUND BREAST LIMITED: CPT | Mod: RT | Performed by: RADIOLOGY

## 2024-07-22 PROCEDURE — 77065 DX MAMMO INCL CAD UNI: CPT | Mod: RT | Performed by: RADIOLOGY

## 2024-08-30 ENCOUNTER — TELEPHONE (OUTPATIENT)
Dept: GASTROENTEROLOGY | Facility: CLINIC | Age: 44
End: 2024-08-30
Payer: COMMERCIAL

## 2024-08-30 NOTE — TELEPHONE ENCOUNTER
"Endoscopy Scheduling Screen    Have you had a positive Covid test in the last 14 days?  No    What is your communication preference for Instructions and/or Bowel Prep?   MyChart    What insurance is in the chart?  Other:  [employer]    Ordering/Referring Provider: Noelle Lundy MD     (If ordering provider performs procedure, schedule with ordering provider unless otherwise instructed. )    BMI: Estimated body mass index is 25.28 kg/m  as calculated from the following:    Height as of 7/12/24: 1.613 m (5' 3.5\").    Weight as of 7/12/24: 65.8 kg (145 lb).     Sedation Ordered  moderate sedation.   If patient BMI > 50 do not schedule in [facility].    If patient BMI > 45 do not schedule at [facility].    Are you taking methadone or Suboxone?  No    Have you had difficulties, pain, or discomfort during past endoscopy procedures?  No    Are you taking any prescription medications for pain 3 or more times per week?   NO, No RN review required.    Do you have a history of malignant hyperthermia?  No    (Females) Are you currently pregnant?   No     Have you been diagnosed or told you have pulmonary hypertension?   No    Do you have an LVAD?  No    Have you been told you have moderate to severe sleep apnea?  No    Have you been told you have COPD, asthma, or any other lung disease?  No    Do you have any heart conditions?  No     Have you ever had or are you waiting for an organ transplant?  No. Continue scheduling, no site restrictions.    Have you had a stroke or transient ischemic attack (TIA aka \"mini stroke\" in the last 6 months?   No    Have you been diagnosed with or been told you have cirrhosis of the liver?   No    Are you currently on dialysis?   No    Do you need assistance transferring?   No    BMI: Estimated body mass index is 25.28 kg/m  as calculated from the following:    Height as of 7/12/24: 1.613 m (5' 3.5\").    Weight as of 7/12/24: 65.8 kg (145 lb).     Is patients BMI > 40 and scheduling location " [facility]?  No    Do you take an injectable medication for weight loss or diabetes (excluding insulin)?  No    Do you take the medication Naltrexone?  No    Do you take blood thinners?  No       Prep   Are you currently on dialysis or do you have chronic kidney disease?  No    Do you have a diagnosis of diabetes?  No    Do you have a diagnosis of cystic fibrosis (CF)?  No    On a regular basis do you go 3 -5 days between bowel movements?  No    BMI > 40?  No    Preferred Pharmacy:    [facility] - [place], [place] - [address]  [address]  [place] [address]  Phone: [phone] Fax: [phone]      Final Scheduling Details     Procedure scheduled  Colonoscopy    Surgeon:  Lisa     Date of procedure:  12/11/2024     Pre-OP / PAC:   No - Not required for this site.    Location  [facility] - [facility] - Patient preference.    Sedation   Moderate Sedation - Per order.      Patient Reminders:   You will receive a call from a Nurse to review instructions and health history.  This assessment must be completed prior to your procedure.  Failure to complete the Nurse assessment may result in the procedure being cancelled.      On the day of your procedure, please designate an adult(s) who can drive you home stay with you for the next 24 hours. The medicines used in the exam will make you sleepy. You will not be able to drive.      You cannot take public transportation, ride share services, or non-medical taxi service without a responsible caregiver.  Medical transport services are allowed with the requirement that a responsible caregiver will receive you at your destination.  We require that drivers and caregivers are confirmed prior to your procedure.

## 2024-09-06 ENCOUNTER — OFFICE VISIT (OUTPATIENT)
Dept: FAMILY MEDICINE | Facility: CLINIC | Age: 44
End: 2024-09-06
Payer: COMMERCIAL

## 2024-09-06 VITALS
OXYGEN SATURATION: 100 % | HEART RATE: 69 BPM | HEIGHT: 63 IN | WEIGHT: 146.7 LBS | TEMPERATURE: 97.8 F | DIASTOLIC BLOOD PRESSURE: 70 MMHG | SYSTOLIC BLOOD PRESSURE: 116 MMHG | RESPIRATION RATE: 21 BRPM | BODY MASS INDEX: 25.99 KG/M2

## 2024-09-06 DIAGNOSIS — Z13.6 CARDIOVASCULAR SCREENING; LDL GOAL LESS THAN 160: ICD-10-CM

## 2024-09-06 DIAGNOSIS — Z00.00 ROUTINE GENERAL MEDICAL EXAMINATION AT A HEALTH CARE FACILITY: Primary | ICD-10-CM

## 2024-09-06 DIAGNOSIS — F41.1 GENERALIZED ANXIETY DISORDER: ICD-10-CM

## 2024-09-06 DIAGNOSIS — Z11.59 NEED FOR HEPATITIS C SCREENING TEST: ICD-10-CM

## 2024-09-06 DIAGNOSIS — Z13.1 SCREENING FOR DIABETES MELLITUS: ICD-10-CM

## 2024-09-06 LAB
CHOLEST SERPL-MCNC: 232 MG/DL
FASTING STATUS PATIENT QL REPORTED: NO
FASTING STATUS PATIENT QL REPORTED: NO
GLUCOSE SERPL-MCNC: 79 MG/DL (ref 70–99)
HCV AB SERPL QL IA: NONREACTIVE
HDLC SERPL-MCNC: 57 MG/DL
LDLC SERPL CALC-MCNC: 145 MG/DL
NONHDLC SERPL-MCNC: 175 MG/DL
TRIGL SERPL-MCNC: 152 MG/DL

## 2024-09-06 PROCEDURE — 36415 COLL VENOUS BLD VENIPUNCTURE: CPT | Performed by: FAMILY MEDICINE

## 2024-09-06 PROCEDURE — 99213 OFFICE O/P EST LOW 20 MIN: CPT | Mod: 25 | Performed by: FAMILY MEDICINE

## 2024-09-06 PROCEDURE — 96127 BRIEF EMOTIONAL/BEHAV ASSMT: CPT | Performed by: FAMILY MEDICINE

## 2024-09-06 PROCEDURE — 80061 LIPID PANEL: CPT | Performed by: FAMILY MEDICINE

## 2024-09-06 PROCEDURE — 86803 HEPATITIS C AB TEST: CPT | Performed by: FAMILY MEDICINE

## 2024-09-06 PROCEDURE — 90471 IMMUNIZATION ADMIN: CPT | Performed by: FAMILY MEDICINE

## 2024-09-06 PROCEDURE — 99396 PREV VISIT EST AGE 40-64: CPT | Mod: 25 | Performed by: FAMILY MEDICINE

## 2024-09-06 PROCEDURE — 90472 IMMUNIZATION ADMIN EACH ADD: CPT | Performed by: FAMILY MEDICINE

## 2024-09-06 PROCEDURE — 82947 ASSAY GLUCOSE BLOOD QUANT: CPT | Performed by: FAMILY MEDICINE

## 2024-09-06 PROCEDURE — 90656 IIV3 VACC NO PRSV 0.5 ML IM: CPT | Performed by: FAMILY MEDICINE

## 2024-09-06 PROCEDURE — 90746 HEPB VACCINE 3 DOSE ADULT IM: CPT | Performed by: FAMILY MEDICINE

## 2024-09-06 RX ORDER — CITALOPRAM HYDROBROMIDE 10 MG/1
10 TABLET ORAL DAILY
Qty: 90 TABLET | Refills: 4 | Status: SHIPPED | OUTPATIENT
Start: 2024-09-06

## 2024-09-06 ASSESSMENT — PAIN SCALES - GENERAL: PAINLEVEL: MILD PAIN (2)

## 2024-09-06 NOTE — PROGRESS NOTES
"Preventive Care Visit  [facility]  Noelle Lundy MD, Family Medicine  Sep 6, 2024      Assessment & Plan     (Z00.00) Routine general medical examination at a health care facility  (primary encounter diagnosis)    (F41.1) Generalized anxiety disorder  Comment: At goal  The current medical regimen is effective;  continue present plan and medications.    Plan: citalopram (CELEXA) 10 MG tablet          Routine screening tests as below:    (Z11.59) Need for hepatitis C screening test  Plan: Hepatitis C Screen Reflex to HCV RNA Quant and         Genotype    (Z13.6) CARDIOVASCULAR SCREENING; LDL GOAL LESS THAN 160  Plan: Lipid panel reflex to direct LDL Non-fasting    (Z13.1) Screening for diabetes mellitus  Plan: Glucose    Patient has been advised of split billing requirements and indicates understanding: Yes        BMI  Estimated body mass index is 25.67 kg/m  as calculated from the following:    Height as of this encounter: 1.61 m (5' 3.39\").    Weight as of this encounter: 66.5 kg (146 lb 11.2 oz).       Counseling  Appropriate preventive services were addressed with this patient via screening, questionnaire, or discussion as appropriate for fall prevention, nutrition, physical activity, Tobacco-use cessation, social engagement, weight loss and cognition.  Checklist reviewing preventive services available has been given to the patient.  Reviewed patient's diet, addressing concerns and/or questions.   She is at risk for psychosocial distress and has been provided with information to reduce risk.     Kailyn Gruber is a 43 year old, presenting for the following:  Physical and Follow Up (/Derm, bumps on the forehead, was one and now has more than one)        9/6/2024     9:00 AM   Additional Questions   Roomed by ROQUE Walker        Health Care Directive  Patient does not have a Health Care Directive or Living Will: Discussed advance care planning with patient; information given to " patient to review.    HPI    Anxiety   How are you doing with your anxiety since your last visit? No change  Are you having other symptoms that might be associated with anxiety? No  Have you had a significant life event? No   Are you feeling depressed? No  Do you have any concerns with your use of alcohol or other drugs? No    Social History     Tobacco Use    Smoking status: Never     Passive exposure: Never    Smokeless tobacco: Never   Vaping Use    Vaping status: Never Used   Substance Use Topics    Alcohol use: Yes     Comment: occ    Drug use: No         3/9/2017    10:13 AM 3/15/2022     4:23 PM   ROSA-7 SCORE   Total Score  6 (mild anxiety)   Total Score 5 6         1/22/2018    11:32 AM 4/18/2018    11:17 AM 1/25/2024     9:43 AM   PHQ   PHQ-9 Total Score 2 2 1   Q9: Thoughts of better off dead/self-harm past 2 weeks Not at all Not at all Not at all         9/6/2024   General Health   How would you rate your overall physical health? Good   Feel stress (tense, anxious, or unable to sleep) Only a little      (!) STRESS CONCERN      9/6/2024   Nutrition   Three or more servings of calcium each day? Yes   Diet: Regular (no restrictions)   How many servings of fruit and vegetables per day? 4 or more   How many sweetened beverages each day? 0-1            9/6/2024   Exercise   Days per week of moderate/strenous exercise 5 days            9/6/2024   Social Factors   Frequency of gathering with friends or relatives Twice a week   Worry food won't last until get money to buy more No   Food not last or not have enough money for food? No   Do you have housing? (Housing is defined as stable permanent housing and does not include staying ouside in a car, in a tent, in an abandoned building, in an overnight shelter, or couch-surfing.) Yes   Are you worried about losing your housing? No   Lack of transportation? No   Unable to get utilities (heat,electricity)? No            9/6/2024   Dental   Dentist two times every year?  Yes            9/6/2024   TB Screening   Were you born outside of the [place]? No              Today's PHQ-2 Score:       1/25/2024     9:43 AM   PHQ-2 ( 1999 Pf[employer])   Q1: Little interest or pleasure in doing things 0   Q2: Feeling down, depressed or hopeless 0   PHQ-2 Score 0         9/6/2024   Substance Use   Alcohol more than 3/day or more than 7/wk No   Do you use any other substances recreationally? No        Social History     Tobacco Use    Smoking status: Never     Passive exposure: Never    Smokeless tobacco: Never   Vaping Use    Vaping status: Never Used   Substance Use Topics    Alcohol use: Yes     Comment: occ    Drug use: No           7/22/2024   LAST FHS-7 RESULTS   1st degree relative breast or ovarian cancer No   Any relative bilateral breast cancer No   Any male have breast cancer No   Any ONE woman have BOTH breast AND ovarian cancer No   Any woman with breast cancer before 50yrs No   2 or more relatives with breast AND/OR ovarian cancer No   2 or more relatives with breast AND/OR bowel cancer No           Mammogram Screening - Mammogram every 1-2 years updated in Health Maintenance based on mutual decision making        9/6/2024   STI Screening   New sexual partner(s) since last STI/HIV test? No        History of abnormal Pap smear: No - age 30- 64 PAP with HPV every 5 years recommended        Latest Ref Rng & Units 1/13/2023    11:02 AM 4/18/2018    11:39 AM 4/18/2018    10:13 AM   PAP / HPV   PAP  Negative for Intraepithelial Lesion or Malignancy (NILM)      PAP (Historical)    NIL    HPV 16 DNA Negative Negative  Negative     HPV 18 DNA Negative Negative  Negative     Other HR HPV Negative Negative  Negative       ASCVD Risk   The ASCVD Risk score (Yifan TORRES, et al., 2019) failed to calculate for the following reasons:    Cannot find a previous HDL lab    Cannot find a previous total cholesterol lab        9/6/2024   Contraception/Family Planning   Questions about contraception or family  "planning No           Reviewed and updated as needed this visit by Provider                    Past Medical History:   Diagnosis Date    Anxiety     worse post-partum    Esophageal reflux 2018    started during pregnancy; got worse year later    Ovarian cyst rupture 2010    Palpitations     off and on for years; more off now    Recurrent otitis media     Stomach problems 2018    started post-pregnancy in 2018     Past Surgical History:   Procedure Laterality Date    HEAD & NECK SURGERY      MYRINGOTOMY, INSERT TUBE, COMBINED Right 2024    Procedure: COMBINED RIGHT MYRINGOTOMY, INSERT TUBE;  Surgeon: Emeli Berg MD;  [place] [facility] [place]    [facility] SPINAL FUSION,ANT,EA ADNL LEVEL  2006    fused cervical vertebrae ?c3-4     OB History    Para Term  AB Living   2 2 2 0 0 2   SAB IAB Ectopic Multiple Live Births   0 0 0 0 2      # Outcome Date GA Lbr Hill/2nd Weight Sex Type Anes PTL Lv   2 Term 16 39w6d  3.912 kg (8 lb 10 oz) M Vag-Spont  N MACIE      Name: Jean      Apgar1: 9  Apgar5: 9   1 Term 07/14/15 39w3d 09:10 / 02:00 3.719 kg (8 lb 3.2 oz) M Vag-Spont EPI  MACIE      Name: Manuel      Apgar1: 9  Apgar5: 9         Review of Systems  Constitutional, HEENT, cardiovascular, pulmonary, gi and gu systems are negative, except as otherwise noted.     Objective    Exam  /70 (BP Location: Right arm, Patient Position: Sitting, Cuff Size: Adult Regular)   Pulse 69   Temp 97.8  F (36.6  C) (Temporal)   Resp 21   Ht 1.61 m (5' 3.39\")   Wt 66.5 kg (146 lb 11.2 oz)   SpO2 100%   BMI 25.67 kg/m     Estimated body mass index is 25.67 kg/m  as calculated from the following:    Height as of this encounter: 1.61 m (5' 3.39\").    Weight as of this encounter: 66.5 kg (146 lb 11.2 oz).    Physical Exam  GENERAL: alert and no distress  EYES: Eyes grossly normal to inspection, PERRL and conjunctivae and sclerae normal  HENT: ear canals and TM's normal, nose and mouth without ulcers or lesions. Right ear " myringotomy tube present in ventral aspect of TM, tube appears to be in place. Dorsal aspect of TM normal appearing, pearly gray, no fluid noted.  NECK: no adenopathy, no asymmetry, masses, or scars  RESP: lungs clear to auscultation - no rales, rhonchi or wheezes  CV: regular rate and rhythm, normal S1 S2, no S3 or S4, no murmur, click or rub, no peripheral edema  ABDOMEN: soft, nontender, no hepatosplenomegaly, no masses and bowel sounds normal  MS: no gross musculoskeletal defects noted, no edema  SPINE: mild thoracic scoliosis noted with periscapular muscles tenseness noted.  SKIN: no suspicious lesions or rashes; subcutaneous, nontender smooth masses noted of forehead consistent with benign cysts. No overlying skin changes noted.  NEURO: Normal strength and tone, mentation intact and speech normal  PSYCH: mentation appears normal, affect normal/bright        Signed Electronically by: Noelle Lundy MD

## 2024-09-06 NOTE — PATIENT INSTRUCTIONS
Patient Education   Preventive Care Advice   This is general advice given by our system to help you stay healthy. However, your care team may have specific advice just for you. Please talk to your care team about your preventive care needs.  Nutrition  Eat 5 or more servings of fruits and vegetables each day.  Try wheat bread, brown rice and whole grain pasta (instead of white bread, rice, and pasta).  Get enough calcium and vitamin D. Check the label on foods and aim for 100% of the RDA (recommended daily allowance).  Lifestyle  Exercise at least 150 minutes each week  (30 minutes a day, 5 days a week).  Do muscle strengthening activities 2 days a week. These help control your weight and prevent disease.  No smoking.  Wear sunscreen to prevent skin cancer.  Have a dental exam and cleaning every 6 months.  Yearly exams  See your health care team every year to talk about:  Any changes in your health.  Any medicines your care team has prescribed.  Preventive care, family planning, and ways to prevent chronic diseases.  Shots (vaccines)   HPV shots (up to age 26), if you've never had them before.  Hepatitis B shots (up to age 59), if you've never had them before.  COVID-19 shot: Get this shot when it's due.  Flu shot: Get a flu shot every year.  Tetanus shot: Get a tetanus shot every 10 years.  Pneumococcal, hepatitis A, and RSV shots: Ask your care team if you need these based on your risk.  Shingles shot (for age 50 and up)  General health tests  Diabetes screening:  Starting at age 35, Get screened for diabetes at least every 3 years.  If you are younger than age 35, ask your care team if you should be screened for diabetes.  Cholesterol test: At age 39, start having a cholesterol test every 5 years, or more often if advised.  Bone density scan (DEXA): At age 50, ask your care team if you should have this scan for osteoporosis (brittle bones).  Hepatitis C: Get tested at least once in your life.  STIs (sexually  transmitted infections)  Before age 24: Ask your care team if you should be screened for STIs.  After age 24: Get screened for STIs if you're at risk. You are at risk for STIs (including HIV) if:  You are sexually active with more than one person.  You don't use condoms every time.  You or a partner was diagnosed with a sexually transmitted infection.  If you are at risk for HIV, ask about PrEP medicine to prevent HIV.  Get tested for HIV at least once in your life, whether you are at risk for HIV or not.  Cancer screening tests  Cervical cancer screening: If you have a cervix, begin getting regular cervical cancer screening tests starting at age 21.  Breast cancer scan (mammogram): If you've ever had breasts, begin having regular mammograms starting at age 40. This is a scan to check for breast cancer.  Colon cancer screening: It is important to start screening for colon cancer at age 45.  Have a colonoscopy test every 10 years (or more often if you're at risk) Or, ask your provider about stool tests like a FIT test every year or Cologuard test every 3 years.  To learn more about your testing options, visit:   .  For help making a decision, visit:   https://bit.ly/du01032.  Prostate cancer screening test: If you have a prostate, ask your care team if a prostate cancer screening test (PSA) at age 55 is right for you.  Lung cancer screening: If you are a current or former smoker ages 50 to 80, ask your care team if ongoing lung cancer screenings are right for you.  For informational purposes only. Not to replace the advice of your health care provider. Copyright   2023 [employer]. All rights reserved. Clinically reviewed by the [facility]. [employer] [id number] - REV 01/24.

## 2024-09-06 NOTE — NURSING NOTE
Prior to immunization administration, verified patients identity using patient s name and date of birth. Please see Immunization Activity for additional information.     Screening Questionnaire for Adult Immunization    Are you sick today?   No   Do you have allergies to medications, food, a vaccine component or latex?   Yes   Have you ever had a serious reaction after receiving a vaccination?   No   Do you have a long-term health problem with heart, lung, kidney, or metabolic disease (e.g., diabetes), asthma, a blood disorder, no spleen, complement component deficiency, a cochlear implant, or a spinal fluid leak?  Are you on long-term aspirin therapy?   No   Do you have cancer, leukemia, HIV/AIDS, or any other immune system problem?   No   Do you have a parent, brother, or sister with an immune system problem?   No   In the past 3 months, have you taken medications that affect  your immune system, such as prednisone, other steroids, or anticancer drugs; drugs for the treatment of rheumatoid arthritis, Crohn s disease, or psoriasis; or have you had radiation treatments?   No   Have you had a seizure, or a brain or other nervous system problem?   No   During the past year, have you received a transfusion of blood or blood    products, or been given immune (gamma) globulin or antiviral drug?   No   For women: Are you pregnant or is there a chance you could become       pregnant during the next month?   No   Have you received any vaccinations in the past 4 weeks?   No     Immunization questionnaire was positive for at least one answer.  Notified Dr. Nikkie MD.      Patient instructed to remain in clinic for 15 minutes afterwards, and to report any adverse reactions.     Screening performed by Marley Mello MA on 9/6/2024 at 9:48 AM.

## 2024-09-10 NOTE — RESULT ENCOUNTER NOTE
Thank you very much for getting labs done!     Your glucose was normal, which is great, you do not have diabetes.     Your screening test was negative for Hepatitis C, which is great news! You have NOT been infected with this liver disease.  You do not need any further testing for Hepatitis C.     Your total cholesterol and triglycerides are slightly high. Your HDL and LDL cholesterol look great!    Thank you for getting your cholesterol checked!  Desired or goal levels are:  CHOLESTEROL: Desirable is less than 200.  HDL (Good Cholesterol): Desirable is greater than 40 in men and greater than 50 in women.  LDL (Bad Cholesterol): Desirable is less than 160  TRIGLYCERIDES: Desirable is less than 150.     To keep triglycerides in check, reduce highly produced carbohydrates in your diet, including alcohol, sugar, juice, white bread, pasta, rice and cereal in your diet.    As you may know, abnormal cholesterol is one factor that increases your risk for heart disease and stroke. You can improve your cholesterol by controlling the amount and type of fat you eat and by increasing your daily activity level.    Here are some ways to improve your nutrition (adapted from the [employer] handout):  Eat less fat (especially butter, Crisco and other saturated fats)  Buy lean cuts of meat; reduce your portions of red meat or substitute poultry or fish, or avoid meat altogether.  Use skim milk and low-fat dairy products  Eat no more than 4 egg yolks per week  Avoid fried or fast foods that are high in fat  Eat more fruits and vegetables, trying to make your plate of food at least half non-starchy vegetables.     If you have any questions, please contact the clinic or schedule an appointment with me, thank you!    Sincerely,  Dr. Noelle Lundy MD  9/10/2024

## 2024-09-11 NOTE — PROGRESS NOTES
ASSESSMENT: Allyn Mcqueen is a 43 year old female presents for consultation at the request of PCP Noelle Lundy, with past medical history significant for GERD, juvenile idiopathic scoliosis, cervical degenerative disc disease with history of cervical fusion 2006, anxiety, who presents today for new patient evaluation of :    -Chronic progressive generalized neck pain radiating into the upper thoracic trapezius region, some consistency with discogenic pain and myofascial pain.  No current radicular component.    -Chronic mild intermittent left low back pain related to thoracolumbar scoliosis.    Patient is neurologically intact on exam. No myelopathic or red flag symptoms.          9/15/2024     8:22 PM   OSWESTRY DISABILITY INDEX   Count 10   Sum 5   Oswestry Score (%) 10 %            Diagnoses and all orders for this visit:  Neck pain  -     Spine  Referral  -     Physical Therapy  Referral; Future  History of fusion of cervical spine  -     Spine  Referral  -     Physical Therapy  Referral; Future  DDD (degenerative disc disease), cervical  -     Spine  Referral  -     Physical Therapy  Referral; Future  Adolescent idiopathic scoliosis of thoracolumbar region  -     Physical Therapy  Referral; Future  Chronic left-sided low back pain without sciatica  -     Physical Therapy  Referral; Future     PLAN:  Reviewed spine anatomy and disease process. Discussed diagnosis and treatment options with the patient today. A shared decision making model was used. The patient's values and choices were respected. The following represents what was discussed and decided upon by the provider and the patient.     -DIAGNOSTIC TESTS:  Images were personally reviewed and interpreted and explained to patient today using spine model.   --If symptoms fail to improve with PT or worsen I would recommend cervical MRI however she is neurologically intact  with good range of motion therefore we will trial PT first.  --Cervical spine x-ray [facility] 4/9/2024 with solid fusion at C5-6.  Advanced degenerative changes at C4-5 and C6-7 with arthropathy at both levels.  Very minimal retrolisthesis at C4-5.    -PHYSICAL THERAPY: Patient has good range of motion with minimal pain with range of motion therefore recommend cervical MedX and lumbar MedX program for intensive core strengthening as tolerated.  Discussed the importance of core strengthening, ROM, stretching exercises with the patient and how each of these entities is important in decreasing pain.  Explained to the patient that the purpose of physical therapy is to teach the patient a home exercise program.  These exercises need to be performed every day in order to decrease pain and prevent future occurrences of pain.  Likened it to brushing one's teeth.      -MEDICATIONS: No changes in medications at this time  Discussed multiple medication options today with patient. Discussed risks, side effects, and proper use of medications. Patient verbalized understanding.    -INTERVENTIONS: No recommendations for injections at this time.  Discussed risks and benefits of injections with patient today.    -PATIENT EDUCATION: Total time of 46 minutes, on the day of service, spent with the patient, reviewing the chart, placing orders, and documenting.     -FOLLOW-UP:   Follow-up if symptoms or not improving with PT, sooner if pain worsens or new symptoms arise.    Advised patient to call the [facility] if symptoms worsen or you have problems controlling bladder and bowel function.   ______________________________________________________________________    SUBJECTIVE:   Allyn Mcqueen  is a 43 year old female who presents today for new patient evaluation of neck pain generally cervical spine that currently is a 2/10 up to a 5 at its worst at the base of the skull that radiates to the upper thoracic T1 and T2 region with pain  radiating to the trapezius region as well.  Ongoing for the last year but worse in the last 5 months with no known injury.  Patient had fusion surgery many years ago and did really well postsurgery, did have some left arm pain and weakness prior to surgery that resolved postsurgery.  Currently she denies any radiating arm pain.  Denies upper extremity weakness.  Does report intermittent numbness and tingling in the neck but otherwise denies constant numbness or tingling sensations.  Denies recent trips or falls or balance changes.  Denies bowel or bladder loss control.    Mild intermittent chronic left low back pain as well.  Denies leg pain or lower extremity weakness.    -Treatment to Date: Cervical fusion 2006.    -Medications:    Current Outpatient Medications   Medication Sig Dispense Refill    citalopram (CELEXA) 10 MG tablet Take 1 tablet (10 mg) by mouth daily. 90 tablet 4    fluticasone (FLONASE) 50 MCG/ACT nasal spray Spray 2 sprays into both nostrils 2 times daily For 2 months then 2 sprays daily after 18.2 mL 8    levonorgestrel (MIRENA) 20 MCG/24HR IUD 1 each by Intrauterine route once      omeprazole (PRILOSEC) 20 MG DR capsule Take 1 capsule by mouth every 24 hours       No current facility-administered medications for this visit.       Allergies   Allergen Reactions    Adhesive Tape Rash    Amoxicillin Rash    Cefixime Rash    Latex Rash       Past Medical History:   Diagnosis Date    Anxiety     worse post-partum    Esophageal reflux 2018    started during pregnancy; got worse year later    Ovarian cyst rupture 04/2010    Palpitations     off and on for years; more off now    Recurrent otitis media     Stomach problems 2018    started post-pregnancy in 2018        Patient Active Problem List   Diagnosis    CARDIOVASCULAR SCREENING; LDL GOAL LESS THAN 160    Type O blood, Rh positive    Juvenile idiopathic scoliosis, unspecified spinal region    Allergic rhinitis    Anxiety    Recurrent acute otitis  "media    Gastroesophageal reflux disease without esophagitis    Mastoiditis, acute, right    Dysfunction of right eustachian tube    Neck pain    DDD (degenerative disc disease), cervical    History of fusion of cervical spine       Past Surgical History:   Procedure Laterality Date    HEAD & NECK SURGERY      MYRINGOTOMY, INSERT TUBE, COMBINED Right 7/12/2024    Procedure: COMBINED RIGHT MYRINGOTOMY, INSERT TUBE;  Surgeon: Emeli Berg MD;  Location: [facility] [place] OR    [facility] SPINAL FUSION,ANT,EA ADNL LEVEL  2006    fused cervical vertebrae ?c3-4       Family History   Problem Relation Age of Onset    Respiratory Mother         COPD    Psychotic Disorder Mother         Bi polar    Mental Illness Mother         diagnosed bipolar; depression    Lipids Father     Heart Disease Maternal Grandmother     Cerebrovascular Disease Maternal Grandmother     Cancer Paternal Aunt         breast 60s       Reviewed past medical, surgical, and family history with patient found on new patient intake packet located in EMR Media tab.     SOCIAL HX: Patient is , works as a .  Patient denies smoking/tobacco use.  Does report alcohol use regularly, denies history being a heavy drinker, denies recreational drug use.    ROS: Positive for muscle pain, dizziness, excessive tiredness, anxiety, constipation, reflux, headache.  Specifically negative for bowel/bladder dysfunction, balance changes, dizziness, foot drop, fevers, chills, appetite changes, nausea/vomiting, unexplained weight loss. Otherwise 13 systems reviewed are negative. Please see the patient's intake questionnaire from today for details.    OBJECTIVE:  /78 (BP Location: Left arm, Patient Position: Sitting, Cuff Size: Adult Regular)   Pulse 68   Ht 5' 3.39\" (1.61 m)   Wt 146 lb 11.2 oz (66.5 kg)   BMI 25.67 kg/m      PHYSICAL EXAMINATION:  --CONSTITUTIONAL: Vital signs as above. No acute distress. The patient is well nourished and well " groomed.  --PSYCHIATRIC: The patient is awake, alert, oriented to person, place, time and answering questions appropriately with clear speech. Appropriate mood and affect   --HEENT: Sclera are non-injected. Extraocular muscles are intact. Thyroid moves easily upon swallowing.  Moist oral mucosa.  --SKIN: Skin over the face, bilateral upper extremities, and posterior torso is clean, dry, intact without rashes.  --LYMPH NODES: No palpable or tender anterior/posterior cervical, submandibular, or supraclavicular lymph nodes.    --RESPIRATORY: Normal rhythm and effort. No abnormal accessory muscle breathing patterns noted.   --GROSS MOTOR: Easily arises from a seated position. Toe walking and heel walking are normal.    --CERVICAL SPINE: Inspection reveals no evidence of deformity. Range of motion is not limited in cervical flexion, extension, lateral rotation. No tenderness to palpation cervical spine.  Spurling maneuver negative bilaterally.  --SHOULDERS: Full range of motion bilaterally: abduction, flexion, cross chest movement internal/external rotation. Negative empty can.  --UPPER EXTREMITY MOTOR TESTING:  Wrist flexion left 5/5, right 5/5  Wrist extension left 5/5, right 5/5  Pronators left 5/5, right 5/5  Biceps left 5/5, right 5/5   Triceps left 5/5, right 5/5   Shoulder abduction left 5/5, right 5/5   left 5/5, right 5/5  --NEUROLOGIC: CN III-XII are grossly intact. 2/4 symmetric biceps, brachioradialis, triceps reflexes bilaterally. Sensation to upper extremities is intact.  Negative Nelson's bilaterally.    --VASCULAR: 2/4 radial pulses bilaterally. Warm upper limbs bilaterally. Capillary refill in the upper extremities is less than 1 second.    RESULTS: [facility] medical records from [facility]where were reviewed today.     Imaging:  Spine imaging was personally reviewed and interpreted today. The images were shown to the patient and the findings were explained using a spine  model.      Cervical spine x-ray from [facility] reviewed.

## 2024-09-16 ENCOUNTER — OFFICE VISIT (OUTPATIENT)
Dept: PHYSICAL MEDICINE AND REHAB | Facility: CLINIC | Age: 44
End: 2024-09-16
Attending: FAMILY MEDICINE
Payer: COMMERCIAL

## 2024-09-16 VITALS
DIASTOLIC BLOOD PRESSURE: 78 MMHG | BODY MASS INDEX: 25.99 KG/M2 | SYSTOLIC BLOOD PRESSURE: 116 MMHG | HEIGHT: 63 IN | WEIGHT: 146.7 LBS | HEART RATE: 68 BPM

## 2024-09-16 DIAGNOSIS — Z98.1 HISTORY OF FUSION OF CERVICAL SPINE: ICD-10-CM

## 2024-09-16 DIAGNOSIS — M50.30 DDD (DEGENERATIVE DISC DISEASE), CERVICAL: ICD-10-CM

## 2024-09-16 DIAGNOSIS — M54.50 CHRONIC LEFT-SIDED LOW BACK PAIN WITHOUT SCIATICA: ICD-10-CM

## 2024-09-16 DIAGNOSIS — M54.2 NECK PAIN: Primary | ICD-10-CM

## 2024-09-16 DIAGNOSIS — M41.125 ADOLESCENT IDIOPATHIC SCOLIOSIS OF THORACOLUMBAR REGION: ICD-10-CM

## 2024-09-16 DIAGNOSIS — G89.29 CHRONIC LEFT-SIDED LOW BACK PAIN WITHOUT SCIATICA: ICD-10-CM

## 2024-09-16 PROCEDURE — 99204 OFFICE O/P NEW MOD 45 MIN: CPT | Performed by: NURSE PRACTITIONER

## 2024-09-16 ASSESSMENT — PAIN SCALES - GENERAL: PAINLEVEL: MILD PAIN (2)

## 2024-09-16 NOTE — LETTER
9/16/2024      Allyn Mcqueen  [address]  [place] [address]      Dear Colleague,    Thank you for referring your patient, Allyn Mcqueen, to the [facility]. Please see a copy of my visit note below.    ASSESSMENT: Allyn Mcqueen is a 43 year old female presents for consultation at the request of PCP Noelle Lundy, with past medical history significant for GERD, juvenile idiopathic scoliosis, cervical degenerative disc disease with history of cervical fusion 2006, anxiety, who presents today for new patient evaluation of :    -Chronic progressive generalized neck pain radiating into the upper thoracic trapezius region, some consistency with discogenic pain and myofascial pain.  No current radicular component.    -Chronic mild intermittent left low back pain related to thoracolumbar scoliosis.    Patient is neurologically intact on exam. No myelopathic or red flag symptoms.          9/15/2024     8:22 PM   OSWESTRY DISABILITY INDEX   Count 10   Sum 5   Oswestry Score (%) 10 %            Diagnoses and all orders for this visit:  Neck pain  -     Spine  Referral  -     Physical Therapy  Referral; Future  History of fusion of cervical spine  -     Spine  Referral  -     Physical Therapy  Referral; Future  DDD (degenerative disc disease), cervical  -     Spine  Referral  -     Physical Therapy  Referral; Future  Adolescent idiopathic scoliosis of thoracolumbar region  -     Physical Therapy  Referral; Future  Chronic left-sided low back pain without sciatica  -     Physical Therapy  Referral; Future     PLAN:  Reviewed spine anatomy and disease process. Discussed diagnosis and treatment options with the patient today. A shared decision making model was used. The patient's values and choices were respected. The following represents what was discussed and decided upon by the provider and the  patient.     -DIAGNOSTIC TESTS:  Images were personally reviewed and interpreted and explained to patient today using spine model.   --If symptoms fail to improve with PT or worsen I would recommend cervical MRI however she is neurologically intact with good range of motion therefore we will trial PT first.  --Cervical spine x-ray [facility] 4/9/2024 with solid fusion at C5-6.  Advanced degenerative changes at C4-5 and C6-7 with arthropathy at both levels.  Very minimal retrolisthesis at C4-5.    -PHYSICAL THERAPY: Patient has good range of motion with minimal pain with range of motion therefore recommend cervical MedX and lumbar MedX program for intensive core strengthening as tolerated.  Discussed the importance of core strengthening, ROM, stretching exercises with the patient and how each of these entities is important in decreasing pain.  Explained to the patient that the purpose of physical therapy is to teach the patient a home exercise program.  These exercises need to be performed every day in order to decrease pain and prevent future occurrences of pain.  Likened it to brushing one's teeth.      -MEDICATIONS: No changes in medications at this time  Discussed multiple medication options today with patient. Discussed risks, side effects, and proper use of medications. Patient verbalized understanding.    -INTERVENTIONS: No recommendations for injections at this time.  Discussed risks and benefits of injections with patient today.    -PATIENT EDUCATION: Total time of 46 minutes, on the day of service, spent with the patient, reviewing the chart, placing orders, and documenting.     -FOLLOW-UP:   Follow-up if symptoms or not improving with PT, sooner if pain worsens or new symptoms arise.    Advised patient to call the Spine Center if symptoms worsen or you have problems controlling bladder and bowel function.   ______________________________________________________________________    SUBJECTIVE:   Allyn Mcqueen   is a 43 year old female who presents today for new patient evaluation of neck pain generally cervical spine that currently is a 2/10 up to a 5 at its worst at the base of the skull that radiates to the upper thoracic T1 and T2 region with pain radiating to the trapezius region as well.  Ongoing for the last year but worse in the last 5 months with no known injury.  Patient had fusion surgery many years ago and did really well postsurgery, did have some left arm pain and weakness prior to surgery that resolved postsurgery.  Currently she denies any radiating arm pain.  Denies upper extremity weakness.  Does report intermittent numbness and tingling in the neck but otherwise denies constant numbness or tingling sensations.  Denies recent trips or falls or balance changes.  Denies bowel or bladder loss control.    Mild intermittent chronic left low back pain as well.  Denies leg pain or lower extremity weakness.    -Treatment to Date: Cervical fusion 2006.    -Medications:    Current Outpatient Medications   Medication Sig Dispense Refill     citalopram (CELEXA) 10 MG tablet Take 1 tablet (10 mg) by mouth daily. 90 tablet 4     fluticasone (FLONASE) 50 MCG/ACT nasal spray Spray 2 sprays into both nostrils 2 times daily For 2 months then 2 sprays daily after 18.2 mL 8     levonorgestrel (MIRENA) 20 MCG/24HR IUD 1 each by Intrauterine route once       omeprazole (PRILOSEC) 20 MG DR capsule Take 1 capsule by mouth every 24 hours       No current facility-administered medications for this visit.       Allergies   Allergen Reactions     Adhesive Tape Rash     Amoxicillin Rash     Cefixime Rash     Latex Rash       Past Medical History:   Diagnosis Date     Anxiety     worse post-partum     Esophageal reflux 2018    started during pregnancy; got worse year later     Ovarian cyst rupture 04/2010     Palpitations     off and on for years; more off now     Recurrent otitis media      Stomach problems 2018    started  post-pregnancy in 2018        Patient Active Problem List   Diagnosis     CARDIOVASCULAR SCREENING; LDL GOAL LESS THAN 160     Type O blood, Rh positive     Juvenile idiopathic scoliosis, unspecified spinal region     Allergic rhinitis     Anxiety     Recurrent acute otitis media     Gastroesophageal reflux disease without esophagitis     Mastoiditis, acute, right     Dysfunction of right eustachian tube     Neck pain     DDD (degenerative disc disease), cervical     History of fusion of cervical spine       Past Surgical History:   Procedure Laterality Date     HEAD & NECK SURGERY       MYRINGOTOMY, INSERT TUBE, COMBINED Right 7/12/2024    Procedure: COMBINED RIGHT MYRINGOTOMY, INSERT TUBE;  Surgeon: Emeli Berg MD;  Location: [facility] [place] OR     [facility] SPINAL FUSION,ANT,EA ADNL LEVEL  2006    fused cervical vertebrae ?c3-4       Family History   Problem Relation Age of Onset     Respiratory Mother         COPD     Psychotic Disorder Mother         Bi polar     Mental Illness Mother         diagnosed bipolar; depression     Lipids Father      Heart Disease Maternal Grandmother      Cerebrovascular Disease Maternal Grandmother      Cancer Paternal Aunt         breast 60s       Reviewed past medical, surgical, and family history with patient found on new patient intake packet located in EMR Media tab.     SOCIAL HX: Patient is , works as a .  Patient denies smoking/tobacco use.  Does report alcohol use regularly, denies history being a heavy drinker, denies recreational drug use.    ROS: Positive for muscle pain, dizziness, excessive tiredness, anxiety, constipation, reflux, headache.  Specifically negative for bowel/bladder dysfunction, balance changes, dizziness, foot drop, fevers, chills, appetite changes, nausea/vomiting, unexplained weight loss. Otherwise 13 systems reviewed are negative. Please see the patient's intake questionnaire from today for details.    OBJECTIVE:  /78 (BP Location:  "Left arm, Patient Position: Sitting, Cuff Size: Adult Regular)   Pulse 68   Ht 5' 3.39\" (1.61 m)   Wt 146 lb 11.2 oz (66.5 kg)   BMI 25.67 kg/m      PHYSICAL EXAMINATION:  --CONSTITUTIONAL: Vital signs as above. No acute distress. The patient is well nourished and well groomed.  --PSYCHIATRIC: The patient is awake, alert, oriented to person, place, time and answering questions appropriately with clear speech. Appropriate mood and affect   --HEENT: Sclera are non-injected. Extraocular muscles are intact. Thyroid moves easily upon swallowing.  Moist oral mucosa.  --SKIN: Skin over the face, bilateral upper extremities, and posterior torso is clean, dry, intact without rashes.  --LYMPH NODES: No palpable or tender anterior/posterior cervical, submandibular, or supraclavicular lymph nodes.    --RESPIRATORY: Normal rhythm and effort. No abnormal accessory muscle breathing patterns noted.   --GROSS MOTOR: Easily arises from a seated position. Toe walking and heel walking are normal.    --CERVICAL SPINE: Inspection reveals no evidence of deformity. Range of motion is not limited in cervical flexion, extension, lateral rotation. No tenderness to palpation cervical spine.  Spurling maneuver negative bilaterally.  --SHOULDERS: Full range of motion bilaterally: abduction, flexion, cross chest movement internal/external rotation. Negative empty can.  --UPPER EXTREMITY MOTOR TESTING:  Wrist flexion left 5/5, right 5/5  Wrist extension left 5/5, right 5/5  Pronators left 5/5, right 5/5  Biceps left 5/5, right 5/5   Triceps left 5/5, right 5/5   Shoulder abduction left 5/5, right 5/5   left 5/5, right 5/5  --NEUROLOGIC: CN III-XII are grossly intact. 2/4 symmetric biceps, brachioradialis, triceps reflexes bilaterally. Sensation to upper extremities is intact.  Negative Nelson's bilaterally.    --VASCULAR: 2/4 radial pulses bilaterally. Warm upper limbs bilaterally. Capillary refill in the upper extremities is less than 1 " second.    RESULTS: Prior medical records from [facility] and Care Everywhere were reviewed today.     Imaging:  Spine imaging was personally reviewed and interpreted today. The images were shown to the patient and the findings were explained using a spine model.      Cervical spine x-ray from [facility] reviewed.      Again, thank you for allowing me to participate in the care of your patient.        Sincerely,        Betzaida Saldana, CNP

## 2024-09-16 NOTE — PATIENT INSTRUCTIONS
~Spine Center Scheduling #[phone].  ~Please call our [facility] Nurse Navigation #[phone] with any questions or concerns about your treatment plan, if symptoms worsen and you would like to be seen urgently, or if you have problems controlling bladder and bowel function.  ~For any future flareups or new symptoms, recommend follow-up in clinic or contact the nurse navigator line.  ~Please note that any My Chart messages may take multiple days for a response due to the high volume of patients seen in clinic.  Anything sent Thursday night or after will be answered the following week when able, as Betzaida Saldana CNP does not work in clinic on Fridays.   ~Betzaida Saldana CNP is at the [facility] on Tuesdays, otherwise primarily at the [facility].       ~You have been referred for Physical Therapy to [facility]. They will call you to schedule an appointment.       Scheduling phone number is [phone] for [facility], [place], or [place] location.  If you have not heard from the scheduling office within 2 business days, please call [phone] for ALL other locations.     Discussed the importance of core strengthening, ROM, stretching exercises and how each of these entities is important in decreasing pain and improving long term spine health.  The purpose of physical therapy is to teach you an individualized home exercise program.  These exercises need to be performed every day in order to decrease pain and prevent future occurrences of pain.

## 2024-10-04 ENCOUNTER — THERAPY VISIT (OUTPATIENT)
Dept: PHYSICAL THERAPY | Facility: CLINIC | Age: 44
End: 2024-10-04
Attending: NURSE PRACTITIONER
Payer: COMMERCIAL

## 2024-10-04 DIAGNOSIS — M41.125 ADOLESCENT IDIOPATHIC SCOLIOSIS OF THORACOLUMBAR REGION: ICD-10-CM

## 2024-10-04 DIAGNOSIS — M54.50 CHRONIC LEFT-SIDED LOW BACK PAIN WITHOUT SCIATICA: ICD-10-CM

## 2024-10-04 DIAGNOSIS — G89.29 CHRONIC LEFT-SIDED LOW BACK PAIN WITHOUT SCIATICA: ICD-10-CM

## 2024-10-04 DIAGNOSIS — M54.2 NECK PAIN: ICD-10-CM

## 2024-10-04 DIAGNOSIS — M50.30 DDD (DEGENERATIVE DISC DISEASE), CERVICAL: ICD-10-CM

## 2024-10-04 DIAGNOSIS — Z98.1 HISTORY OF FUSION OF CERVICAL SPINE: ICD-10-CM

## 2024-10-04 PROCEDURE — 97161 PT EVAL LOW COMPLEX 20 MIN: CPT | Mod: GP

## 2024-10-04 PROCEDURE — 97110 THERAPEUTIC EXERCISES: CPT | Mod: GP

## 2024-10-04 NOTE — PROGRESS NOTES
PHYSICAL THERAPY EVALUATION  Type of Visit: Evaluation       Fall Risk Screen:  Fall screen completed by: PT  Have you fallen 2 or more times in the past year?: No  Have you fallen and had an injury in the past year?: No  Is patient a fall risk?: No    Subjective       Presenting condition or subjective complaint: Neck pain and headaches    Patient is a 43 year old female who presents with neck pain complaints; pain tends to radiate into the upper thoracic trap region. Also reports chronic mild intermittent left low back pain. Last few months; neck pain has been more frequent than usual. Is currently going to yoga classes, runs, and walks for physical activity.     Date of onset: 09/16/24    Relevant medical history:     Patient Active Problem List   Diagnosis    CARDIOVASCULAR SCREENING; LDL GOAL LESS THAN 160    Type O blood, Rh positive    Juvenile idiopathic scoliosis, unspecified spinal region    Allergic rhinitis    Anxiety    Recurrent acute otitis media    Gastroesophageal reflux disease without esophagitis    Mastoiditis, acute, right    Dysfunction of right eustachian tube    Neck pain    DDD (degenerative disc disease), cervical    History of fusion of cervical spine       Dates & types of surgery: Cervical spinal fusion in 2007-ish    Prior diagnostic imaging/testing results: X-ray     Prior therapy history for the same diagnosis, illness or injury: No      Prior Level of Function  IND    Living Environment  Social support: With a significant other or spouse   Type of home: House   Stairs to enter the home: Yes 5 Is there a railing: Yes     Ramp: No   Stairs inside the home: Yes 15 Is there a railing: Yes     Help at home: None  Equipment owned:       Employment: Yes   Hobbies/Interests: Yoga, running, play oboe    Patient goals for therapy:      Pain assessment: Pain present     Objective   LUMBAR SPINE EVALUATION  PAIN: Pain Level at Rest: 1/10  Pain Level with Use: 4/10  Pain Location:  "  Pain Quality: Aching  Pain Frequency: intermittent or constant  Pain is Worst: activity dependent  Pain is Exacerbated By: sitting for a long time, twisting,   Pain is Relieved By: stretching, heating pad, chiropractor   Pain Progression: Improved  INTEGUMENTARY (edema, incisions):   POSTURE: Sitting Posture: Rounded shoulders, Forward head, Thoracic kyphosis increased  GAIT:   Weightbearing Status:   Assistive Device(s):   Gait Deviations:   BALANCE/PROPRIOCEPTION:   WEIGHTBEARING ALIGNMENT:   NON-WEIGHTBEARING ALIGNMENT:    ROM:   (Degrees) Left AROM Left PROM  Right AROM Right PROM   Hip Flexion wfl  wfl    Hip Extension       Hip Abduction Wfl  wfl    Hip Adduction       Hip Internal Rotation       Hip External Rotation       Knee Flexion       Knee Extension       Lumbar Side glide Wfl, wfl (painful to the R)    Lumbar Flexion wfl   Lumbar Extension wfl   Pain:   End feel:   PELVIC/SI SCREEN:   STRENGTH:     MYOTOMES:   DTR S:   CORD SIGNS:   DERMATOMES:   NEURAL TENSION:   FLEXIBILITY:   LUMBAR/HIP Special Tests:    PELVIS/SI SPECIAL TESTS:   FUNCTIONAL TESTS:   PALPATION:   SPINAL SEGMENTAL CONCLUSIONS:     CERVICAL SPINE EVALUATION  PAIN: Pain Level at Rest: 2/10  Pain Level with Use: 5/10  Pain Location:   Pain Quality: Dull and constant, tiring, exhausting, \"headachey\"  Pain Frequency: constant  Pain is Worst: loosens up as the day goes  Pain is Exacerbated By: sitting for long periods of time, phone/screens  Pain is Relieved By: staying active   Pain Progression: Improved  INTEGUMENTARY (edema, incisions):   POSTURE:   GAIT:   Weightbearing Status:   Assistive Device(s):   Gait Deviations:   BALANCE/PROPRIOCEPTION:   WEIGHTBEARING ALIGNMENT:   ROM:   (Degrees) Left AROM Right AROM    Cervical Flexion wfl    Cervical Extension wfl    Cervical Side bend wfl wfl    Cervical Rotation wfl wfl    Cervical Protrusion     Cervical Retraction     Thoracic Flexion     Thoracic Extension     Thoracic Rotation    "    Left AROM Left PROM Right AROM Right PROM   Shoulder Flexion wfl  wfl    Shoulder Extension       Shoulder Abduction wfl  wfl    Shoulder Adduction       Shoulder IR       Shoulder ER       Shoulder Horiz Abduction       Shoulder Horiz Adduction       Pain:   End Feel:     MYOTOMES:   DTR S:   CORD SIGNS:   DERMATOMES:   NEURAL TENSION:   FLEXIBILITY:    SPECIAL TESTS:   PALPATION:   SPINAL SEGMENTAL CONCLUSIONS:       Assessment & Plan   CLINICAL IMPRESSIONS  Medical Diagnosis: Z98.1 (ICD-10-CM) - History of fusion of cervical spine  M54.2 (ICD-10-CM) - Neck pain  M50.30 (ICD-10-CM) - DDD (degenerative disc disease), cervical  M41.125 (ICD-10-CM) - Adolescent idiopathic scoliosis of thoracolumbar region  M54.50, G89.29 (ICD-10-CM) - Chronic left-sided low back pain without sciatica    Treatment Diagnosis: neck pain, impaired muscle performance, decreased activity tolerance   Impression/Assessment: Patient is a 43 year old female with neck and LBP (L>R) complaints.  The following significant findings have been identified: Pain, Decreased ROM/flexibility, Decreased joint mobility, Decreased strength, Impaired muscle performance, Decreased activity tolerance, and Impaired posture. These impairments interfere with their ability to perform self care tasks, work tasks, recreational activities, household chores, driving , household mobility, and community mobility as compared to previous level of function.     Clinical Decision Making (Complexity):  Clinical Presentation: Stable/Uncomplicated  Clinical Presentation Rationale: based on medical and personal factors listed in PT evaluation  Clinical Decision Making (Complexity): Low complexity    PLAN OF CARE  Treatment Interventions:  Modalities: Cryotherapy, Hot Pack  Interventions: Gait Training, Manual Therapy, Neuromuscular Re-education, Therapeutic Activity, Therapeutic Exercise, Self-Care/Home Management    Long Term Goals     PT Goal 1  Goal Identifier: HEP  Goal  Description: Pt will be IND in HEP and self care managment of symptoms  Target Date: 12/27/24  PT Goal 2  Goal Identifier: MedX  Goal Description: The patient will increase their cervical strength on the MedX Cervical Machine from baseline by 40 lbs within 12 weeks of starting the treatment  Target Date: 12/27/24  PT Goal 3  Goal Identifier: Strength  Goal Description: Patient will Improve deep neck flexor endurance by achieving a hold time of 45 seconds in the deep neck flexor endurance test within 12 weeks through a targeted strengthening and endurance training program.  Target Date: 12/27/24  PT Goal 4  Goal Identifier: Pain  Goal Description: Reduce neck pain from 5/10 to 2/10 on the pain scale during daily activities within 12 weeks through pain management strategies and activity modifications.  Target Date: 12/27/24  PT Goal 5  Goal Identifier: Sitting ability  Goal Description: The patient will be able to sit for longer than 10 minutes to participate in activities such as meal preparation or using a computer for work tasks without needing to take a break due to pain.  Target Date: 12/27/24      Frequency of Treatment: 2x/week for first 4 weeks, 1x/week afterwards  Duration of Treatment: 12 weeks    Recommended Referrals to Other Professionals:   Education Assessment:   Learner/Method: Patient    Risks and benefits of evaluation/treatment have been explained.   Patient/Family/caregiver agrees with Plan of Care.     Evaluation Time:     PT Eval, Low Complexity Minutes (64229): 37       Signing Clinician: Peter Camacho, PT        All weight progressions in therapy sessions are dictated by the patient tolerance and therapist discretion. Testing on MedX equipment is completed on 4-week intervals to allow for physiological change in muscle structure and neuromuscular re-education.    Lumbar MedX Initial testing 10/4/24   AROM (full=  0-72  lumbar) 0-72   Max Extension Torque  254   Flex: ext ratio (ideal  1.4:1) 1.63:1     Cervical MedX Initial testing 10/4/24   AROM (full= 0-120  cervical) 0-105   Max Extension Torque  220   Flex: ext ratio (ideal 1.4:1) 0.81:1     Date 10/4/24   Lumbar Parameters    Top Dead Center (TDC) 24   Counterbalance (CB) 250   Seat Pad 0   Femur Restraint 4   Week/Visit    Enter Week/Visit # 1/1   Weight (lbs) -   Reps (#) -   Time -   ROM (degrees) -   Pain    Therapist cues    Cervical Parameters    Top Dead Center (TDC) 54   Counterbalance (CB) 1.0   Seat Height 377   Week/Visit    Enter Week/Visit # 1/1   Weight (lbs) -   Reps (#) -   Time -   ROM (degrees) -   Pain    Therapist cues      Date 10/4/24   Exercise    Treadmill 5 min   Chin tucks 1 x 5; 2-3 sec hold   Scapular retractions 1 x 10   Modified Curl-up 1 x 5    Side plank 1 x 15 sec   Birddog 1 x 10                             Modifications instructed by therapist:

## 2024-10-11 ENCOUNTER — THERAPY VISIT (OUTPATIENT)
Dept: PHYSICAL THERAPY | Facility: CLINIC | Age: 44
End: 2024-10-11
Attending: NURSE PRACTITIONER
Payer: COMMERCIAL

## 2024-10-11 DIAGNOSIS — M50.30 DDD (DEGENERATIVE DISC DISEASE), CERVICAL: ICD-10-CM

## 2024-10-11 DIAGNOSIS — M54.2 NECK PAIN: Primary | ICD-10-CM

## 2024-10-11 PROCEDURE — 97110 THERAPEUTIC EXERCISES: CPT | Mod: GP

## 2024-10-11 NOTE — PROGRESS NOTES
All weight progressions in therapy sessions are dictated by the patient tolerance and therapist discretion. Testing on MedX equipment is completed on 4-week intervals to allow for physiological change in muscle structure and neuromuscular re-education.    Lumbar MedX Initial testing 10/4/24   AROM (full=  0-72  lumbar) 0-72   Max Extension Torque  254   Flex: ext ratio (ideal 1.4:1) 1.63:1     Cervical MedX Initial testing 10/4/24   AROM (full= 0-120  cervical) 0-105   Max Extension Torque  220   Flex: ext ratio (ideal 1.4:1) 0.81:1     Date 10/11/24 10/4/24   Lumbar Parameters     Top Dead Center (TDC) 24 24   Counterbalance (CB) 250 250   Seat Pad 0 0   Femur Restraint 4 4   Week/Visit     Enter Week/Visit # 1/2 1/1   Weight (lbs) 90 -   Reps (#) 12 -   Time 86 -   ROM (degrees) 0-72 -   Pain     Therapist cues     Cervical Parameters     Top Dead Center (TDC) 54 54   Counterbalance (CB) 1.0 1.0   Seat Height 377 377   Week/Visit     Enter Week/Visit # 1/2 1/1   Weight (lbs) 90 -   Reps (#) 16 -   Time 143 -   ROM (degrees) 0-105 -   Pain     Therapist cues       Date 10/11/24 10/4/24   Exercise     Treadmill 5 min  5 min   Chin tucks  1 x 5; 2-3 sec hold   Scapular retractions  1 x 10   Modified Curl-up  1 x 5    Side plank  1 x 15 sec   Birddog  1 x 10   Rotary torso 22# L                               Modifications instructed by therapist:

## 2024-10-15 ENCOUNTER — THERAPY VISIT (OUTPATIENT)
Dept: PHYSICAL THERAPY | Facility: CLINIC | Age: 44
End: 2024-10-15
Payer: COMMERCIAL

## 2024-10-15 DIAGNOSIS — M50.30 DDD (DEGENERATIVE DISC DISEASE), CERVICAL: Primary | ICD-10-CM

## 2024-10-15 PROCEDURE — 97110 THERAPEUTIC EXERCISES: CPT | Mod: GP

## 2024-10-15 NOTE — PROGRESS NOTES
All weight progressions in therapy sessions are dictated by the patient tolerance and therapist discretion. Testing on MedX equipment is completed on 4-week intervals to allow for physiological change in muscle structure and neuromuscular re-education.    Lumbar MedX Initial testing 10/4/24   AROM (full=  0-72  lumbar) 0-72   Max Extension Torque  254   Flex: ext ratio (ideal 1.4:1) 1.63:1     Cervical MedX Initial testing 10/4/24   AROM (full= 0-120  cervical) 0-105   Max Extension Torque  220   Flex: ext ratio (ideal 1.4:1) 0.81:1     Date 10/15/24 10/11/24 10/4/24   Lumbar Parameters      Top Dead Center (TDC) 24 24 24   Counterbalance (CB) 250 250 250   Seat Pad 0 0 0   Femur Restraint 4 4 4   Week/Visit      Enter Week/Visit # 2/1 1/2 1/1   Weight (lbs) 80 90 -   Reps (#) 15 12 -   Time 90 86 -   ROM (degrees) 0-72 0-72 -   Pain      Therapist cues      Cervical Parameters      Top Dead Center (TDC) 54 54 54   Counterbalance (CB) 1.0 1.0 1.0   Seat Height 377 377 377   Week/Visit      Enter Week/Visit # 2/1 1/2 1/1   Weight (lbs) 92 90 -   Reps (#) 20 16 -   Time 143 143 -   ROM (degrees) 0-105 0-105 -   Pain      Therapist cues        Date 10/15/24 10/11/24 10/4/24   Exercise      Treadmill 5 min 5 min  5 min   Chin tucks   1 x 5; 2-3 sec hold   Scapular retractions   1 x 10   Modified Curl-up   1 x 5    Side plank   1 x 15 sec   Birddog   1 x 10   Rotary torso 24# R 22# L    Reformer Leg Press 1 x 12 all springs     Reformer Leg Press S/L 1 x 12 3R     Reformer Calf Raises 1 x 15 2R                   Modifications instructed by therapist:

## 2024-10-18 ENCOUNTER — PATIENT OUTREACH (OUTPATIENT)
Dept: CARE COORDINATION | Facility: CLINIC | Age: 44
End: 2024-10-18
Payer: COMMERCIAL

## 2024-10-25 ENCOUNTER — THERAPY VISIT (OUTPATIENT)
Dept: PHYSICAL THERAPY | Facility: CLINIC | Age: 44
End: 2024-10-25
Attending: NURSE PRACTITIONER
Payer: COMMERCIAL

## 2024-10-25 DIAGNOSIS — M50.30 DDD (DEGENERATIVE DISC DISEASE), CERVICAL: Primary | ICD-10-CM

## 2024-10-25 PROCEDURE — 97110 THERAPEUTIC EXERCISES: CPT | Mod: GP

## 2024-10-25 NOTE — PROGRESS NOTES
All weight progressions in therapy sessions are dictated by the patient tolerance and therapist discretion. Testing on MedX equipment is completed on 4-week intervals to allow for physiological change in muscle structure and neuromuscular re-education.    Lumbar MedX Initial testing 10/4/24   AROM (full=  0-72  lumbar) 0-72   Max Extension Torque  254   Flex: ext ratio (ideal 1.4:1) 1.63:1     Cervical MedX Initial testing 10/4/24   AROM (full= 0-120  cervical) 0-105   Max Extension Torque  220   Flex: ext ratio (ideal 1.4:1) 0.81:1     Date 10/25/24 10/15/24 10/11/24 10/4/24   Lumbar Parameters       Top Dead Center (TDC) 24 24 24 24   Counterbalance (CB) 250 250 250 250   Seat Pad 0 0 0 0   Femur Restraint 4 4 4 4   Week/Visit       Enter Week/Visit # 2/2 2/1 1/2 1/1   Weight (lbs) 83 80 90 -   Reps (#) 15 15 12 -   Time 100 90 86 -   ROM (degrees) 0-72 0-72 0-72 -   Pain       Therapist cues       Cervical Parameters       Top Dead Center (TDC) 54 54 54 54   Counterbalance (CB) 1.0 1.0 1.0 1.0   Seat Height 377 377 377 377   Week/Visit       Enter Week/Visit # 2/2 2/1 1/2 1/1   Weight (lbs) 96 92 90 -   Reps (#) 17 20 16 -   Time 79 143 143 -   ROM (degrees) 0-105 0-105 0-105 -   Pain       Therapist cues         Date 10/25/24 10/15/24 10/11/24 10/4/24   Exercise       Treadmill 5 min 5 min 5 min  5 min   Chin tucks    1 x 5; 2-3 sec hold   Scapular retractions    1 x 10   Modified Curl-up    1 x 5    Side plank    1 x 15 sec   Birddog    1 x 10   Rotary torso Not today  24# R 22# L    Reformer Leg Press  1 x 12 all springs     Reformer Leg Press S/L  1 x 12 3R     Reformer Calf Raises  1 x 15 2R     4 way neck 20# SB R and L, flexion x 12 reps                Modifications instructed by therapist:

## 2024-11-01 ENCOUNTER — THERAPY VISIT (OUTPATIENT)
Dept: PHYSICAL THERAPY | Facility: CLINIC | Age: 44
End: 2024-11-01
Payer: COMMERCIAL

## 2024-11-01 DIAGNOSIS — M50.30 DDD (DEGENERATIVE DISC DISEASE), CERVICAL: Primary | ICD-10-CM

## 2024-11-01 PROCEDURE — 97110 THERAPEUTIC EXERCISES: CPT | Mod: GP

## 2024-11-01 NOTE — PROGRESS NOTES
All weight progressions in therapy sessions are dictated by the patient tolerance and therapist discretion. Testing on MedX equipment is completed on 4-week intervals to allow for physiological change in muscle structure and neuromuscular re-education.    Lumbar MedX Initial testing 10/4/24   AROM (full=  0-72  lumbar) 0-72   Max Extension Torque  254   Flex: ext ratio (ideal 1.4:1) 1.63:1     Cervical MedX Initial testing 10/4/24   AROM (full= 0-120  cervical) 0-105   Max Extension Torque  220   Flex: ext ratio (ideal 1.4:1) 0.81:1     Date 11/1/24 10/25/24 10/15/24 10/11/24 10/4/24   Lumbar Parameters        Top Dead Center (TDC) 24 24 24 24 24   Counterbalance (CB) 250 250 250 250 250   Seat Pad 0 0 0 0 0   Femur Restraint 4 4 4 4 4   Week/Visit        Enter Week/Visit # 3/1 2/2 2/1 1/2 1/1   Weight (lbs) 87 83 80 90 -   Reps (#) 14 15 15 12 -   Time 88  100 90 86 -   ROM (degrees) 0-72 0-72 0-72 0-72 -   Pain        Therapist cues        Cervical Parameters        Top Dead Center (TDC) 54 54 54 54 54   Counterbalance (CB) 1.0 1.0 1.0 1.0 1.0   Seat Height 377 377 377 377 377   Week/Visit        Enter Week/Visit # 3/1 2/2 2/1 1/2 1/1   Weight (lbs) 99 96 92 90 -   Reps (#) 18 17 20 16 -   Time 77 79 143 143 -   ROM (degrees) 0-105 0-105 0-105 0-105 -   Pain        Therapist cues          Date 11/1/24 10/25/24 10/15/24 10/11/24 10/4/24   Exercise        Treadmill 5 min 5 min 5 min 5 min  5 min   Chin tucks     1 x 5; 2-3 sec hold   Scapular retractions     1 x 10   Modified Curl-up     1 x 5    Side plank     1 x 15 sec   Birddog     1 x 10   Rotary torso 24# L Not today  24# R 22# L    Reformer Leg Press   1 x 12 all springs     Reformer Leg Press S/L   1 x 12 3R     Reformer Calf Raises   1 x 15 2R     4 way neck  20# SB R and L, flexion x 12 reps       Side Plank 1 x 30 sec each side       Plank w/leg extension 1 x 10 sec each side         Modifications instructed by therapist:

## 2024-11-08 ENCOUNTER — THERAPY VISIT (OUTPATIENT)
Dept: PHYSICAL THERAPY | Facility: CLINIC | Age: 44
End: 2024-11-08
Payer: COMMERCIAL

## 2024-11-08 DIAGNOSIS — Z98.1 HISTORY OF FUSION OF CERVICAL SPINE: ICD-10-CM

## 2024-11-08 DIAGNOSIS — M41.125 ADOLESCENT IDIOPATHIC SCOLIOSIS OF THORACOLUMBAR REGION: ICD-10-CM

## 2024-11-08 DIAGNOSIS — G89.29 CHRONIC LEFT-SIDED LOW BACK PAIN WITHOUT SCIATICA: ICD-10-CM

## 2024-11-08 DIAGNOSIS — M50.30 DDD (DEGENERATIVE DISC DISEASE), CERVICAL: Primary | ICD-10-CM

## 2024-11-08 DIAGNOSIS — M54.2 NECK PAIN: ICD-10-CM

## 2024-11-08 DIAGNOSIS — M54.50 CHRONIC LEFT-SIDED LOW BACK PAIN WITHOUT SCIATICA: ICD-10-CM

## 2024-11-08 PROCEDURE — 97110 THERAPEUTIC EXERCISES: CPT | Mod: GP | Performed by: PHYSICAL THERAPIST

## 2024-11-08 NOTE — PROGRESS NOTES
All weight progressions in therapy sessions are dictated by the patient tolerance and therapist discretion. Testing on MedX equipment is completed on 4-week intervals to allow for physiological change in muscle structure and neuromuscular re-education.    Lumbar MedX Initial testing 10/4/24   AROM (full=  0-72  lumbar) 0-72   Max Extension Torque  254   Flex: ext ratio (ideal 1.4:1) 1.63:1     Cervical MedX Initial testing 10/4/24   AROM (full= 0-120  cervical) 0-105   Max Extension Torque  220   Flex: ext ratio (ideal 1.4:1) 0.81:1     Date 11/8/2024 11/1/24 10/25/24 10/15/24 10/11/24 10/4/24   Lumbar Parameters         Top Dead Center (TDC) 24 24 24 24 24 24   Counterbalance (CB) 250 250 250 250 250 250   Seat Pad 0 0 0 0 0 0   Femur Restraint 4 4 4 4 4 4   Week/Visit         Enter Week/Visit # 3/2 3/1 2/2 2/1 1/2 1/1   Weight (lbs) 90 87 83 80 90 -   Reps (#) 15 14 15 15 12 -   Time 105 88  100 90 86 -   ROM (degrees) 0-72 0-72 0-72 0-72 0-72 -   Pain         Therapist cues         Cervical Parameters         Top Dead Center (TDC) 54 54 54 54 54 54   Counterbalance (CB) 1.0 1.0 1.0 1.0 1.0 1.0   Seat Height 377 377 377 377 377 377   Week/Visit         Enter Week/Visit # 3/2 3/1 2/2 2/1 1/2 1/1   Weight (lbs) 102# 99 96 92 90 -   Reps (#) 19 18 17 20 16 -   Time 87 77 79 143 143 -   ROM (degrees) 0-105 0-105 0-105 0-105 0-105 -   Pain         Therapist cues           Date 11/8/2024 11/1/24 10/25/24 10/15/24 10/11/24 10/4/24   Exercise         Treadmill X 5 min  5 min 5 min 5 min 5 min  5 min   Chin tucks      1 x 5; 2-3 sec hold   Scapular retractions      1 x 10   Modified Curl-up      1 x 5    Side plank      1 x 15 sec   Birddog      1 x 10   Rotary torso 26#'s R 24# L Not today  24# R 22# L    Reformer Leg Press    1 x 12 all springs     Reformer Leg Press S/L    1 x 12 3R     Reformer Calf Raises    1 x 15 2R     Reformer 9090 pulldowns X 10 3R        4 way neck 22#'s SB R and L flexion X 12 reps  20# SB R  and L, flexion x 12 reps       Side Plank  1 x 30 sec each side       Plank w/leg extension  1 x 10 sec each side         Modifications instructed by therapist:

## 2024-11-11 ENCOUNTER — ANCILLARY PROCEDURE (OUTPATIENT)
Dept: MAMMOGRAPHY | Facility: CLINIC | Age: 44
End: 2024-11-11
Attending: FAMILY MEDICINE
Payer: COMMERCIAL

## 2024-11-11 DIAGNOSIS — Z12.31 VISIT FOR SCREENING MAMMOGRAM: ICD-10-CM

## 2024-11-11 PROCEDURE — 77063 BREAST TOMOSYNTHESIS BI: CPT | Mod: TC | Performed by: RADIOLOGY

## 2024-11-11 PROCEDURE — 77067 SCR MAMMO BI INCL CAD: CPT | Mod: TC | Performed by: RADIOLOGY

## 2024-11-15 ENCOUNTER — THERAPY VISIT (OUTPATIENT)
Dept: PHYSICAL THERAPY | Facility: CLINIC | Age: 44
End: 2024-11-15
Payer: COMMERCIAL

## 2024-11-15 DIAGNOSIS — M50.30 DDD (DEGENERATIVE DISC DISEASE), CERVICAL: Primary | ICD-10-CM

## 2024-11-15 PROCEDURE — 97110 THERAPEUTIC EXERCISES: CPT | Mod: GP

## 2024-11-15 NOTE — PROGRESS NOTES
All weight progressions in therapy sessions are dictated by the patient tolerance and therapist discretion. Testing on MedX equipment is completed on 4-week intervals to allow for physiological change in muscle structure and neuromuscular re-education.    Lumbar MedX Re-test  11/15/24 Initial testing 10/4/24   AROM (full=  0-72  lumbar) 0-72 0-72   Max Extension Torque  237 254   Flex: ext ratio (ideal 1.4:1) 1.25:1 1.63:1     Cervical MedX Re-test   11/15/24 Initial testing 10/4/24   AROM (full= 0-120  cervical) 0-114 0-105   Max Extension Torque  349 220   Flex: ext ratio (ideal 1.4:1) 1.07:1 0.81:1     Date 11/15/24 11/8/2024 11/1/24 10/25/24 10/15/24 10/11/24 10/4/24   Lumbar Parameters          Top Dead Center (TDC) 24 24 24 24 24 24 24   Counterbalance (CB) 250 250 250 250 250 250 250   Seat Pad 0 0 0 0 0 0 0   Femur Restraint 4 4 4 4 4 4 4   Week/Visit          Enter Week/Visit # 4/1 3/2 3/1 2/2 2/1 1/2 1/1   Weight (lbs) - 90 87 83 80 90 -   Reps (#) - 15 14 15 15 12 -   Time - 105 88  100 90 86 -   ROM (degrees) 0-72 0-72 0-72 0-72 0-72 0-72 -   Pain          Therapist cues          Cervical Parameters          Top Dead Center (TDC) 54 54 54 54 54 54 54   Counterbalance (CB) 1.0 1.0 1.0 1.0 1.0 1.0 1.0   Seat Height 377 377 377 377 377 377 377   Week/Visit          Enter Week/Visit # 4/1 3/2 3/1 2/2 2/1 1/2 1/1   Weight (lbs) - 102# 99 96 92 90 -   Reps (#) - 19 18 17 20 16 -   Time - 87 77 79 143 143 -   ROM (degrees) 0-105 0-105 0-105 0-105 0-105 0-105 -   Pain          Therapist cues            Date 11/15/24  11/8/2024 11/1/24 10/25/24 10/15/24 10/11/24 10/4/24   Exercise          Treadmill 5 min X 5 min  5 min 5 min 5 min 5 min  5 min   Chin tucks       1 x 5; 2-3 sec hold   Scapular retractions       1 x 10   Modified Curl-up       1 x 5    Side plank       1 x 15 sec   Birddog       1 x 10   Rotary torso 26# L 26#'s R 24# L Not today  24# R 22# L    Reformer Leg Press     1 x 12 [facility]      Reformer Leg Press S/L     1 x 12 3R     Reformer Calf Raises     1 x 15 2R     Reformer 9090 pulldowns  X 10 3R        4 way neck  22#'s SB R and L flexion X 12 reps  20# SB R and L, flexion x 12 reps       Side Plank   1 x 30 sec each side       Plank w/leg extension   1 x 10 sec each side         Modifications instructed by therapist:

## 2024-11-25 ENCOUNTER — ANCILLARY PROCEDURE (OUTPATIENT)
Dept: MAMMOGRAPHY | Facility: CLINIC | Age: 44
End: 2024-11-25
Attending: FAMILY MEDICINE
Payer: COMMERCIAL

## 2024-11-25 DIAGNOSIS — R92.8 ABNORMAL MAMMOGRAM: ICD-10-CM

## 2024-11-25 PROCEDURE — G0279 TOMOSYNTHESIS, MAMMO: HCPCS

## 2024-11-25 PROCEDURE — 77065 DX MAMMO INCL CAD UNI: CPT | Mod: LT

## 2024-11-25 PROCEDURE — 76642 ULTRASOUND BREAST LIMITED: CPT | Mod: LT

## 2024-11-27 ENCOUNTER — TELEPHONE (OUTPATIENT)
Dept: GASTROENTEROLOGY | Facility: CLINIC | Age: 44
End: 2024-11-27
Payer: COMMERCIAL

## 2024-11-27 NOTE — TELEPHONE ENCOUNTER
Pre visit planning completed.      Procedure details:    Patient scheduled for Colonoscopy on 12/11/2024.     Arrival time: 0830. Procedure time 0930    Facility location: [facility]; [address], [place] [address]. Check in location: Main entrance at registration desk.    Sedation type: Conscious sedation     Pre op exam needed? No.    Indication for procedure: Screening      Chart review:     Electronic implanted devices? No    Recent diagnosis of diverticulitis within the last 6 weeks? No      Medication review:    Diabetic? No    Anticoagulants? No    Weight loss medication/injectable? No GLP-1 medication per patient's medication list. Nursing to verify with pre-assessment call.    Other medication HOLDING recommendations:  N/A      Prep for procedure:     Bowel prep recommendation: Standard Miralax  Due to: standard bowel prep.    Prep instructions sent via [employer]       Nelida Rubio RN  Endoscopy Procedure Pre Assessment   [phone] option 2

## 2024-11-27 NOTE — TELEPHONE ENCOUNTER
Attempted to contact patient in order to complete pre assessment questions.     No answer. Left message to return call to [phone] option 2.    Callback communication sent via [employer].    Nelida Rubio RN

## 2024-12-02 NOTE — TELEPHONE ENCOUNTER
Pre assessment completed for upcoming procedure.   (Please see previous telephone encounter notes for complete details)    Patient  returned call.       Procedure details:    Arrival time and facility location reviewed.    Pre op exam needed? No.    Designated  policy reviewed. Instructed to have someone stay 6  hours post procedure.       Medication review:    Medications reviewed. Please see supporting documentation below. Holding recommendations discussed (if applicable).       Prep for procedure:     Procedure prep instructions reviewed.        Any additional information needed:  N/A      Patient  verbalized understanding and had no questions or concerns at this time.      Yessenia Reina RN  Endoscopy Procedure Pre Assessment   [phone] option 2

## 2024-12-11 ENCOUNTER — HOSPITAL ENCOUNTER (OUTPATIENT)
Facility: CLINIC | Age: 44
Discharge: HOME | End: 2024-12-11
Attending: INTERNAL MEDICINE | Admitting: INTERNAL MEDICINE
Payer: COMMERCIAL

## 2024-12-11 VITALS
RESPIRATION RATE: 16 BRPM | OXYGEN SATURATION: 100 % | SYSTOLIC BLOOD PRESSURE: 115 MMHG | HEART RATE: 70 BPM | DIASTOLIC BLOOD PRESSURE: 72 MMHG

## 2024-12-11 LAB — COLONOSCOPY: NORMAL

## 2024-12-11 PROCEDURE — G0500 MOD SEDAT ENDO SERVICE >5YRS: HCPCS | Performed by: INTERNAL MEDICINE

## 2024-12-11 PROCEDURE — 45378 DIAGNOSTIC COLONOSCOPY: CPT | Performed by: INTERNAL MEDICINE

## 2024-12-11 PROCEDURE — 258N000003 HC RX IP 258 OP 636: Performed by: INTERNAL MEDICINE

## 2024-12-11 PROCEDURE — 250N000011 HC RX IP 250 OP 636: Performed by: INTERNAL MEDICINE

## 2024-12-11 PROCEDURE — G0121 COLON CA SCRN NOT HI RSK IND: HCPCS | Performed by: INTERNAL MEDICINE

## 2024-12-11 PROCEDURE — 99153 MOD SED SAME PHYS/QHP EA: CPT | Performed by: INTERNAL MEDICINE

## 2024-12-11 RX ORDER — NALOXONE HYDROCHLORIDE 0.4 MG/ML
0.4 INJECTION, SOLUTION INTRAMUSCULAR; INTRAVENOUS; SUBCUTANEOUS
Status: DISCONTINUED | OUTPATIENT
Start: 2024-12-11 | End: 2024-12-11 | Stop reason: HOSPADM

## 2024-12-11 RX ORDER — ONDANSETRON 2 MG/ML
INJECTION INTRAMUSCULAR; INTRAVENOUS PRN
Status: DISCONTINUED | OUTPATIENT
Start: 2024-12-11 | End: 2024-12-11 | Stop reason: HOSPADM

## 2024-12-11 RX ORDER — NALOXONE HYDROCHLORIDE 0.4 MG/ML
0.2 INJECTION, SOLUTION INTRAMUSCULAR; INTRAVENOUS; SUBCUTANEOUS
Status: DISCONTINUED | OUTPATIENT
Start: 2024-12-11 | End: 2024-12-11 | Stop reason: HOSPADM

## 2024-12-11 RX ORDER — ONDANSETRON 4 MG/1
4 TABLET, ORALLY DISINTEGRATING ORAL EVERY 6 HOURS PRN
Status: DISCONTINUED | OUTPATIENT
Start: 2024-12-11 | End: 2024-12-11 | Stop reason: HOSPADM

## 2024-12-11 RX ORDER — FENTANYL CITRATE 50 UG/ML
INJECTION, SOLUTION INTRAMUSCULAR; INTRAVENOUS PRN
Status: DISCONTINUED | OUTPATIENT
Start: 2024-12-11 | End: 2024-12-11 | Stop reason: HOSPADM

## 2024-12-11 RX ORDER — PROCHLORPERAZINE MALEATE 5 MG/1
10 TABLET ORAL EVERY 6 HOURS PRN
Status: DISCONTINUED | OUTPATIENT
Start: 2024-12-11 | End: 2024-12-11 | Stop reason: HOSPADM

## 2024-12-11 RX ORDER — SODIUM CHLORIDE 9 MG/ML
INJECTION, SOLUTION INTRAVENOUS CONTINUOUS PRN
Status: DISCONTINUED | OUTPATIENT
Start: 2024-12-11 | End: 2024-12-11 | Stop reason: HOSPADM

## 2024-12-11 RX ORDER — SODIUM CHLORIDE, SODIUM LACTATE, POTASSIUM CHLORIDE, CALCIUM CHLORIDE 600; 310; 30; 20 MG/100ML; MG/100ML; MG/100ML; MG/100ML
INJECTION, SOLUTION INTRAVENOUS CONTINUOUS PRN
Status: DISCONTINUED | OUTPATIENT
Start: 2024-12-11 | End: 2024-12-11 | Stop reason: HOSPADM

## 2024-12-11 RX ORDER — ONDANSETRON 2 MG/ML
4 INJECTION INTRAMUSCULAR; INTRAVENOUS EVERY 6 HOURS PRN
Status: DISCONTINUED | OUTPATIENT
Start: 2024-12-11 | End: 2024-12-11 | Stop reason: HOSPADM

## 2024-12-11 RX ORDER — FLUMAZENIL 0.1 MG/ML
0.2 INJECTION, SOLUTION INTRAVENOUS
Status: DISCONTINUED | OUTPATIENT
Start: 2024-12-11 | End: 2024-12-11 | Stop reason: HOSPADM

## 2024-12-11 ASSESSMENT — ACTIVITIES OF DAILY LIVING (ADL)
ADLS_ACUITY_SCORE: 41
ADLS_ACUITY_SCORE: 41

## 2024-12-11 NOTE — OR NURSING
Pt had colonoscopy under moderate sedation, no interventions. Pt tolerated procedure with some difficulty getting to cecum. Able to tolerate with breath coaching and additional medications. Pt stable at time of transfer to  on RA.

## 2024-12-11 NOTE — H&P
Gastroenterology Pre-op History and Physical    Allyn Mcqueen MRN# [id number]   Age: 44 year old YOB: 1980      Date of Surgery: 12/11/24  [facility]      Date of Exam 12/11/2024 Facility Same Day       Primary care provider: Noelle Lundy         Chief Complaint and/or Reason for Procedure:   45 yo female for initial screening colonoscopy.  No anticoagulation.  No FH colon cancer.         Past Medical and Surgical History:     Past Medical History:   Diagnosis Date    Anxiety     worse post-partum    Esophageal reflux 2018    started during pregnancy; got worse year later    Ovarian cyst rupture 04/2010    Palpitations     off and on for years; more off now    Recurrent otitis media     Stomach problems 2018    started post-pregnancy in 2018     Past Surgical History:   Procedure Laterality Date    HEAD & NECK SURGERY      MYRINGOTOMY, INSERT TUBE, COMBINED Right 7/12/2024    Procedure: COMBINED RIGHT MYRINGOTOMY, INSERT TUBE;  Surgeon: Emeli Berg MD;  [place] [facility] [place]    [facility] SPINAL FUSION,ANT,EA ADNL LEVEL  2006    fused cervical vertebrae ?c3-4            Medications (include herbals and vitamins):        Medications Prior to Admission   Medication Sig Dispense Refill Last Dose/Taking    citalopram (CELEXA) 10 MG tablet Take 1 tablet (10 mg) by mouth daily. 90 tablet 4 12/10/2024 Morning    fluticasone (FLONASE) 50 MCG/ACT nasal spray Spray 2 sprays into both nostrils 2 times daily For 2 months then 2 sprays daily after 18.2 mL 8 Past Week    omeprazole (PRILOSEC) 20 MG DR capsule Take 1 capsule by mouth every 24 hours   12/11/2024 at  6:20 AM    levonorgestrel (MIRENA) 20 MCG/24HR IUD 1 each by Intrauterine route once                Allergies:      Allergies   Allergen Reactions    Adhesive Tape Rash    Amoxicillin Rash    Cefixime Rash    Latex Rash               Physical Exam:   All vitals have been reviewed  Patient Vitals for the past 8  hrs:   BP SpO2   12/11/24 0852 137/85 100 %     No intake/output data recorded.  Airway assessment:   Patient is able to open mouth wide  Patient is able to stick out tongue  Mallampatti classification: Class I (visualization of the soft palate, fauces, uvula, anterior and posterior pillars)}      Lungs:   No increased work of breathing, good air exchange, clear to auscultation bilaterally, no crackles or wheezing     Cardiovascular:   regular rate and rhythm and normal S1 and S2                 Anesthetic risk and/or ASA classification:   ASA 1    Andrew Gonzalez MD

## 2024-12-17 ENCOUNTER — OFFICE VISIT (OUTPATIENT)
Dept: URGENT CARE | Facility: URGENT CARE | Age: 44
End: 2024-12-17
Payer: COMMERCIAL

## 2024-12-17 VITALS
DIASTOLIC BLOOD PRESSURE: 82 MMHG | OXYGEN SATURATION: 100 % | SYSTOLIC BLOOD PRESSURE: 117 MMHG | WEIGHT: 145 LBS | HEIGHT: 64 IN | HEART RATE: 65 BPM | BODY MASS INDEX: 24.75 KG/M2 | TEMPERATURE: 97.4 F

## 2024-12-17 DIAGNOSIS — S06.0XAA CONCUSSION WITH UNKNOWN LOSS OF CONSCIOUSNESS STATUS, INITIAL ENCOUNTER: Primary | ICD-10-CM

## 2024-12-17 DIAGNOSIS — S09.90XA INJURY OF HEAD, INITIAL ENCOUNTER: ICD-10-CM

## 2024-12-17 DIAGNOSIS — R55 VASOVAGAL SYNCOPE: ICD-10-CM

## 2024-12-17 PROCEDURE — 99214 OFFICE O/P EST MOD 30 MIN: CPT | Performed by: NURSE PRACTITIONER

## 2024-12-17 ASSESSMENT — PAIN SCALES - GENERAL: PAINLEVEL_OUTOF10: MODERATE PAIN (4)

## 2024-12-17 NOTE — PROGRESS NOTES
Chief Complaint   Patient presents with    Urgent Care    Fall    Head Injury     Pt in clinic to have eval for head injury that occurred after fall last week.     SUBJECTIVE:  Allyn Mcqueen is a 44 year old female presenting with head hit 1 week ago on tile floor related to colonscopy prep sycope with lingering 2-6/10 ha, fatigue, forehead left orbital bruising.  She says around 0200 when she was getting up to take her dose of colonoscopy prep she fell in the bathroom on the tile.  She has a relevant past medical history of syncope.  Had a prodrome of dizziness nausea weakness clamminess.  She felt a little weak, but was able to get herself back up.  Believes she was on the ground for a couple minutes.  Hit the top of her forehead at the hairline as well as her left eyebrow.  Went in for her colonoscopy the next day and there were no concerns from their team.  No vision change vomiting pain with eye movements bony step-off crepitus confusion retrograde amnesia disorientation.  The bruising and headache are improving, but still present.  No prior history of major concussions.    Past Medical History:   Diagnosis Date    Anxiety     worse post-partum    Esophageal reflux 2018    started during pregnancy; got worse year later    Ovarian cyst rupture 04/2010    Palpitations     off and on for years; more off now    Recurrent otitis media     Stomach problems 2018    started post-pregnancy in 2018     Current Outpatient Medications   Medication Sig Dispense Refill    citalopram (CELEXA) 10 MG tablet Take 1 tablet (10 mg) by mouth daily. 90 tablet 4    fluticasone (FLONASE) 50 MCG/ACT nasal spray Spray 2 sprays into both nostrils 2 times daily For 2 months then 2 sprays daily after 18.2 mL 8    levonorgestrel (MIRENA) 20 MCG/24HR IUD 1 each by Intrauterine route once      omeprazole (PRILOSEC) 20 MG DR capsule Take 1 capsule by mouth every 24 hours       No current facility-administered medications for this visit.  "    Social History     Tobacco Use    Smoking status: Never     Passive exposure: Never    Smokeless tobacco: Never   Substance Use Topics    Alcohol use: Yes     Comment: occ     Allergies   Allergen Reactions    Adhesive Tape Rash    Amoxicillin Rash    Cefixime Rash    Latex Rash       Review of Systems  All systems negative except for those listed above in HPI.    OBJECTIVE:   /82   Pulse 65   Temp 97.4  F (36.3  C) (Temporal)   Ht 1.626 m (5' 4\")   Wt 65.8 kg (145 lb)   LMP 12/12/2024   SpO2 100%   BMI 24.89 kg/m    Physical Exam  Vitals reviewed.   Constitutional:       Appearance: Normal appearance.   HENT:      Head:      Comments: Frontal forehead with scabbed over abrasion at scalp and greenish-purple bruising.  Left eyebrow with mild edema and bruising.  No bony step-off crepitus profound tenderness.  Cardiovascular:      Rate and Rhythm: Normal rate.      Pulses: Normal pulses.   Pulmonary:      Effort: Pulmonary effort is normal.   Musculoskeletal:         General: Normal range of motion.      Cervical back: Normal range of motion. No rigidity or tenderness.   Skin:     General: Skin is warm and dry.      Findings: Bruising and erythema present. No rash.   Neurological:      General: No focal deficit present.      Mental Status: She is alert and oriented to person, place, and time.      Cranial Nerves: No cranial nerve deficit.      Sensory: No sensory deficit.      Motor: No weakness.      Coordination: Coordination normal.      Gait: Gait normal.      Deep Tendon Reflexes: Reflexes normal.   Psychiatric:         Mood and Affect: Mood normal.         Behavior: Behavior normal.       ASSESSMENT:    ICD-10-CM    1. Concussion with unknown loss of consciousness status, initial encounter  S06.0XAA Concussion  Referral      2. Vasovagal syncope  R55       3. Injury of head, initial encounter  S09.90XA         PLAN:     Vasovagal syncopal related head injury with concussion  Discussed " [place] head CT scoring  Urgent care is limited with just x-ray, cannot rule out tiny orbital fracture  We will hold on ADS referral for facial scan, can discuss with PCP if lingering or concerns  Reassuring exam, no bony step-off crepitus neuro red flags  Concussion  for follow-up  Rotate Tylenol ibuprofen as needed  Monitor closely  ER if worse headache of life confusion vomiting vision change trouble walking talking    Follow up with primary care provider with any problems, questions or concerns or if symptoms worsen or fail to improve. Patient agreed to plan and verbalized understanding.    Katherine Ye, NOMAN-[facility] [place]

## 2024-12-17 NOTE — PATIENT INSTRUCTIONS
Vasovagal syncopal related head injury with concussion  Discussed [place] head CT scoring  Urgent care is limited with just x-ray, cannot rule out tiny orbital fracture  We will hold on ADS referral for facial scan, can discuss with PCP if lingering or concerns  Reassuring exam, no bony step-off crepitus neuro red flags  Concussion  for follow-up  Rotate Tylenol ibuprofen as needed  Monitor closely  ER if worse headache of life confusion vomiting vision change trouble walking talking

## 2024-12-30 NOTE — PROGRESS NOTES
[facility] EAR NOSE AND THROAT CLINIC [address] [address]  Phone: [phone]  Fax: [phone]    Patient:  Allyn Mcqueen, Date of birth 1980  Date of Visit:  01/02/2025  Referring Provider Referred Self      Assessment & Plan      Allyn Mcqueen is a 44 year old female being seen for a post-operative follow-up following a Right myringotomy and t-tube placement on 07/12/2024. Physical examination showed the right T-tube is in position, and both ears appear healthy. It has been decided to pursue observation for a year. She has been encouraged to reach out if she has any concerns before her next appointment. She will return in 1-year with an updated audiogram.     HPI  Allyn Mcqueen is a 44 year old female being seen for a post-operative follow-up following a Right myringotomy and t-tube placement on 07/12/2024. Since the previous appointment, she has had COVID. She has not had any decline in hearing. She reports having some intermittent pressure.     /84   Pulse 64   Temp 98.4  F (36.9  C)   Wt 68 kg (150 lb)   LMP 12/12/2024   SpO2 99%   BMI 25.75 kg/m    Physical examination:  female in no acute distress.  Alert and answering questions appropriately.  HB 1/6 bilaterally.  Both ears examined under the microscope. Both ear canals clear. Left TMs intact, middle ear aerated. The right T-tube is in position in the anterior superior quadrant, middle ear aerated.    Scribe Disclosure:   I, Samantha Kahn, am serving as a scribe; to document services personally performed by Emeli Berg MD -based on data collection and the provider's statements to me.     Provider Disclosure:  I agree with above History, Review of Systems, Physical exam and Plan.  I have reviewed the content of the documentation and have edited it as needed. I have personally performed the services documented here and the documentation accurately represents  those services and the decisions I have made.

## 2025-01-02 ENCOUNTER — OFFICE VISIT (OUTPATIENT)
Dept: OTOLARYNGOLOGY | Facility: CLINIC | Age: 45
End: 2025-01-02
Payer: COMMERCIAL

## 2025-01-02 VITALS
DIASTOLIC BLOOD PRESSURE: 84 MMHG | HEART RATE: 64 BPM | SYSTOLIC BLOOD PRESSURE: 123 MMHG | TEMPERATURE: 98.4 F | OXYGEN SATURATION: 99 % | BODY MASS INDEX: 25.75 KG/M2 | WEIGHT: 150 LBS

## 2025-01-02 DIAGNOSIS — H69.91 DYSFUNCTION OF RIGHT EUSTACHIAN TUBE: Primary | ICD-10-CM

## 2025-01-02 PROCEDURE — 92504 EAR MICROSCOPY EXAMINATION: CPT | Performed by: OTOLARYNGOLOGY

## 2025-01-02 PROCEDURE — 99212 OFFICE O/P EST SF 10 MIN: CPT | Mod: 25 | Performed by: OTOLARYNGOLOGY

## 2025-01-02 ASSESSMENT — PAIN SCALES - GENERAL: PAINLEVEL_OUTOF10: NO PAIN (0)

## 2025-01-02 NOTE — PATIENT INSTRUCTIONS
You were seen in the ENT Clinic today by Dr. Berg. If you have any questions or concerns after your appointment, please contact us (see below)      2.   Please return to clinic in one year with one of our clinic's APPs with an audiogram prior.        How to Contact Us:  Send a engageSimply message to your provider. Our team will respond to you via engageSimply. Occasionally, we will need to call you to get further information.  For urgent matters (Monday-Friday), call the ENT Clinic: [phone] and speak with a call center team member - they will route your call appropriately.   If you'd like to speak directly with a nurse, please find our contact information below. We do our best to check voicemail frequently throughout the day, and will work to call you back within 1-2 days. For urgent matters, please use the general clinic phone numbers listed above.      Sherin GILLETTE RN  ENT RN Care Coordinator  Direct: [phone]    Annette DICKENS LPN  Direct: [phone]

## 2025-01-02 NOTE — Clinical Note
1/2/2025       RE: Allyn Mcqueen  [address]  [place] [address]     Dear Colleague,    Thank you for referring your patient, Allyn Mcqueen, to the [facility] EAR NOSE AND THROAT CLINIC [place] [facility]. Please see a copy of my visit note below.      [facility] EAR NOSE AND THROAT CLINIC [address]  [address] [facility] [address]  Phone: [phone]  Fax: [phone]    Patient:  Allyn Mcqueen, Date of birth 1980  Date of Visit:  01/02/2025  Referring Provider Referred Self      Assessment & Plan     Allyn Mcqueen is a 44 year old female being seen for a post-operative follow-up following a Right myringotomy and t-tube placement on 07/12/2024. Physical examination shown the right T-tube is in position, and both ears appear healthy. It has been decided to pursue observation for a year. She has been encouraged to reach out if she has any concerns before her next appointment. She will return in 1-year with an updated audiogram.     HPI  Allyn Mcqueen is a 44 year old female being seen for a post-operative follow-up following a Right myringotomy and t-tube placement on 07/12/2024. Since the previous appointment, she has had COVID. She has not had any decline in hearing. She reports having some intermittent pressure.     /84   Pulse 64   Temp 98.4  F (36.9  C)   Wt 68 kg (150 lb)   LMP 12/12/2024   SpO2 99%   BMI 25.75 kg/m    Physical examination:  female in no acute distress.  Alert and answering questions appropriately.  HB 1/6 bilaterally.  Both ears examined under the microscope. Both ear canals clear, TMs are intact, middle ears are aerated. The right T-tube is in position in the anterior superior quadrant.     Scribe Disclosure:   Samantha ZHENG, am serving as a scribe; to document services personally performed by Emeli Berg MD -based on data collection and the  provider's statements to me.     Provider Disclosure:  I agree with above History, Review of Systems, Physical exam and Plan.  I have reviewed the content of the documentation and have edited it as needed. I have personally performed the services documented here and the documentation accurately represents those services and the decisions I have made.      Electronically signed by:  ***        [facility] E[facility] NOSE AND THROAT CLINIC [address] [address]  Phone: [phone]  Fax: [phone]    Patient:  Allyn Mcqueen, Date of birth 1980  Date of Visit:  01/02/2025  Referring Provider Referred Self      Assessment & Plan     Allyn Mcqueen is a 44 year old female being seen for a post-operative follow-up following a Right myringotomy and t-tube placement on 07/12/2024. Physical examination shown the right T-tube is in position, and both ears appear healthy. It has been decided to pursue observation for a year. She has been encouraged to reach out if she has any concerns before her next appointment. She will return in 1-year with an updated audiogram.     HPI  Allyn Mcqueen is a 44 year old female being seen for a post-operative follow-up following a Right myringotomy and t-tube placement on 07/12/2024. Since the previous appointment, she has had COVID. She has not had any decline in hearing. She reports having some intermittent pressure.     /84   Pulse 64   Temp 98.4  F (36.9  C)   Wt 68 kg (150 lb)   LMP 12/12/2024   SpO2 99%   BMI 25.75 kg/m    Physical examination:  female in no acute distress.  Alert and answering questions appropriately.  HB 1/6 bilaterally.  Both ears examined under the microscope. Both ear canals clear. Left TMs intact, middle ear aerated. The right T-tube is in position in the anterior superior quadrant, middle ear aerated.    Scribe Disclosure:   I, Samantha Kahn, am serving as a scribe; to document  services personally performed by Emeli Berg MD -based on data collection and the provider's statements to me.     Provider Disclosure:  I agree with above History, Review of Systems, Physical exam and Plan.  I have reviewed the content of the documentation and have edited it as needed. I have personally performed the services documented here and the documentation accurately represents those services and the decisions I have made.          Again, thank you for allowing me to participate in the care of your patient.      Sincerely,    Emeli Berg MD

## 2025-02-03 NOTE — PROGRESS NOTES
Assessment:     Diagnoses and all orders for this visit:  Cervical radiculopathy  -     MR Cervical Spine w/o Contrast; Future  DDD (degenerative disc disease), cervical  -     MR Cervical Spine w/o Contrast; Future  History of fusion of cervical spine  -     MR Cervical Spine w/o Contrast; Future     Allyn Mcqueen is a 44 year old y.o. female with past medical history significant for GERD, juvenile idiopathic scoliosis, cervical degenerative disc disease with history of cervical fusion 2006, anxiety, who presents today for follow-up regarding:     -Generalized neck pain with some improvements with physical therapy however now progressive numbness and tingling bilateral upper extremities nonspecific.    Plan:     A shared decision making plan was used.  The patient's values and choices were respected. Prior medical records were reviewed today. The following represents what was discussed and decided upon by the provider and the patient.     -DIAGNOSTIC TESTS: Images were personally reviewed and interpreted.    --Cervical spine x-ray [facility] 4/9/2024 with solid fusion at C5-6.  Advanced degenerative changes at C4-5 and C6-7 with arthropathy at both levels.  Very minimal retrolisthesis at C4-5.     -INTERVENTIONS: Consider C7-T1 IL KALEY pending imaging review.  There is degenerative changes below her fusion at C6-7.  Patient is unsure if she wants to move forward with the injection but would like to know her options.    -MEDICATIONS: No changes in medications.  We did discuss Medrol Dosepak today, patient defers this option today.    Discussed side effects of medications and proper use. Patient verbalized understanding.    -PHYSICAL THERAPY: Did encourage patient to continue with home exercises from recent physical therapy sessions.  Discussed the importance of core strengthening, ROM, stretching exercises with the patient and how each of these entities is important in decreasing pain.  Explained to the patient that  the purpose of physical therapy is to teach the patient a home exercise program.  These exercises need to be performed every day in order to decrease pain and prevent future occurrences of pain.        -PATIENT EDUCATION: Total time of 32 minutes, on the day of service, spent with the patient, reviewing the chart, placing orders, and documenting.     -FOLLOW UP: Follow-up Seakeeper message for imaging results.  Advised to contact clinic if symptoms worsen or change.    Subjective:     Allyn Mcqueen is a 44 year old female who presents today for follow-up regarding generalized neck pain rating to bilateral upper extremities.  She does report that physical therapy helped a little bit with her neck pain and substantially with headaches.  However over the last month or so she has had worsening numbness and tingling into the bilateral upper extremities both on the thumb and pinky side, slightly worse on the left but overall fairly equal she reports.  Currently she does report that her pain is a 2/10 up to a 5 at its worst.    She denies any upper extremity weakness.  Denies any recent trips or falls or balance changes.  Denies saddle anesthesia.    No myelopathic symptoms.     -Treatment to Date: Cervical fusion 2006.  Physical therapy multilevel sessions October through December 2024 neck pain     -Medications:    Patient Active Problem List   Diagnosis    CARDIOVASCULAR SCREENING; LDL GOAL LESS THAN 160    Type O blood, Rh positive    Juvenile idiopathic scoliosis, unspecified spinal region    Allergic rhinitis    Anxiety    Recurrent acute otitis media    Gastroesophageal reflux disease without esophagitis    Mastoiditis, acute, right    Dysfunction of right eustachian tube    Neck pain    DDD (degenerative disc disease), cervical    History of fusion of cervical spine       Current Outpatient Medications   Medication Sig Dispense Refill    citalopram (CELEXA) 10 MG tablet Take 1 tablet (10 mg) by mouth daily. 90  "tablet 4    fluticasone (FLONASE) 50 MCG/ACT nasal spray Spray 2 sprays into both nostrils 2 times daily For 2 months then 2 sprays daily after 18.2 mL 8    levonorgestrel (MIRENA) 20 MCG/24HR IUD 1 each by Intrauterine route once      omeprazole (PRILOSEC) 20 MG DR capsule Take 1 capsule by mouth every 24 hours       No current facility-administered medications for this visit.       Allergies   Allergen Reactions    Adhesive Tape Rash    Amoxicillin Rash    Cefixime Rash    Latex Rash       Past Medical History:   Diagnosis Date    Anxiety     worse post-partum    Esophageal reflux 2018    started during pregnancy; got worse year later    Ovarian cyst rupture 04/2010    Palpitations     off and on for years; more off now    Recurrent otitis media     Stomach problems 2018    started post-pregnancy in 2018        Review of Systems  ROS: Specifically negative for bowel/bladder dysfunction, balance changes, headache, dizziness, foot drop, fevers, chills, appetite changes, nausea/vomiting, unexplained weight loss. Otherwise 13 systems reviewed are negative. Please see the patient's intake questionnaire from today for details.    Reviewed Social, Family, Past Medical and Past Surgical history with patient, no significant changes noted since prior visit.     Objective:     /57 (BP Location: Left arm, Patient Position: Sitting, Cuff Size: Adult Regular)   Pulse 74   Ht 5' 4\" (1.626 m)   Wt 150 lb 14.4 oz (68.4 kg)   LMP 12/12/2024   BMI 25.90 kg/m      PHYSICAL EXAMINATION:   --CONSTITUTIONAL: Vital signs as above. No acute distress. The patient is well nourished and well groomed.  --PSYCHIATRIC:  The patient is awake, alert, oriented to person, place, time and answering questions appropriately with clear speech. Appropriate mood and affect   --HEENT: Sclera are non-injected. Extraocular muscles are intact.   --SKIN: Skin over the face, bilateral upper extremities, and posterior torso is clean, dry, intact " without rashes.  --RESPIRATORY: Normal rhythm and effort. No abnormal accessory muscle breathing patterns noted.   --GROSS MOTOR: Easily arises from a seated position.   --CERVICAL SPINE: Inspection reveals no evidence of deformity.   --UPPER EXTREMITY MOTOR TESTING:  Wrist flexion left 5/5, right 5/5  Wrist extension left 5/5, right 5/5  Biceps left 5/5, right 5/5   Triceps left 5/5, right 5/5   Shoulder abduction left 5/5, right 5/5   left 5/5, right 5/5  --NEUROLOGIC: CN III-XII are grossly intact. 2/4 symmetric biceps, brachioradialis, triceps reflexes bilaterally. Sensation to upper extremities is intact. Negative Nelson's bilaterally.   --VASCULAR: Warm upper limbs bilaterally.   --Wrist: Negative Phalen and negative Tinel testing    Imaging: Spine imaging was reviewed today. The images were shown to the patient and the findings were explained using a spine model.      Cervical spine x-ray reviewed

## 2025-02-05 ENCOUNTER — OFFICE VISIT (OUTPATIENT)
Dept: PHYSICAL MEDICINE AND REHAB | Facility: CLINIC | Age: 45
End: 2025-02-05
Payer: COMMERCIAL

## 2025-02-05 VITALS
WEIGHT: 150.9 LBS | BODY MASS INDEX: 25.76 KG/M2 | HEART RATE: 74 BPM | DIASTOLIC BLOOD PRESSURE: 57 MMHG | SYSTOLIC BLOOD PRESSURE: 118 MMHG | HEIGHT: 64 IN

## 2025-02-05 DIAGNOSIS — M54.12 CERVICAL RADICULOPATHY: Primary | ICD-10-CM

## 2025-02-05 DIAGNOSIS — Z98.1 HISTORY OF FUSION OF CERVICAL SPINE: ICD-10-CM

## 2025-02-05 DIAGNOSIS — M50.30 DDD (DEGENERATIVE DISC DISEASE), CERVICAL: ICD-10-CM

## 2025-02-05 PROCEDURE — 99214 OFFICE O/P EST MOD 30 MIN: CPT | Performed by: NURSE PRACTITIONER

## 2025-02-05 ASSESSMENT — PAIN SCALES - GENERAL: PAINLEVEL_OUTOF10: MILD PAIN (2)

## 2025-02-05 NOTE — PATIENT INSTRUCTIONS
~Spine Center Scheduling #[phone].  ~Please call our [facility] Spine Nurse Navigation #[phone] with any questions or concerns about your treatment plan, if symptoms worsen and you would like to be seen urgently, or if you have problems controlling bladder and bowel function.  ~For any future flareups or new symptoms, recommend follow-up in clinic or contact the nurse navigator line.  ~Please note that any My Chart messages may take multiple days for a response due to the high volume of patients seen in clinic.  Anything sent Thursday night or after will be answered the following week when able, as Betzaida Saldana CNP does not work in clinic on Fridays.   ~Betzaida Saldana CNP is at the [facility] on Tuesdays, otherwise primarily at the [facility].       Importance of specialized Physical Therapy: Discussed the importance of core strengthening, ROM, stretching exercises with the patient and how each of these entities is important in decreasing pain.  Explained to the patient that the purpose of physical therapy is to teach the patient a home exercise program individualized to them and their specific health concerns.  These exercises need to be performed every day in order to decrease pain and prevent future occurrences of pain.           Imaging has been ordered. Radiology will call you to schedule. Please call below if you do not hear from them in the next couple of days.   [facility] Radiology Scheduling  Please call [phone] to schedule your image(s) (select option #1). There are 3 different locations near, see below.   There are imaging options for other locations as well, the imaging schedulers can help with other locations within [place].     [facility]  [address] [place], [place] [id number]    [facility] [place]  [address], [address]   [facility] [address]    [place] [id number] [facility]  [place] [address]

## 2025-03-17 ENCOUNTER — TELEPHONE (OUTPATIENT)
Dept: OTOLARYNGOLOGY | Facility: CLINIC | Age: 45
End: 2025-03-17
Payer: COMMERCIAL

## 2025-03-17 NOTE — TELEPHONE ENCOUNTER
Left Voicemail (1st Attempt) and Sent Mychart (1st Attempt) for the patient to call back and schedule the following:    Appointment Type: Return ENT  Provider: Aiyana Minor PA-C - [facility]     RESCHEDULING 12/22/25 APPOINTMENTS  Appt date: -IF STILL OPEN-  CALLED TO OFFER IN JUNE/JULY 2025  DUE TO LAST COMPLETED VISIT 5/2024, AND THIS IS FOR 1 YR F/U w/ AUDIO PRIOR  6/27/25 @9:45AM AUDIO   @10:20AM AIYANA  7/31/25 @8AM AUDIO   9:20AM AIYANA  8/1/25 @9:30AM AUDIO   10:20AM AIYANA    Specialty phone number: [phone]   Additional appointment(s) needed:   Additional Notes:

## 2025-03-31 ENCOUNTER — TELEPHONE (OUTPATIENT)
Dept: OTOLARYNGOLOGY | Facility: CLINIC | Age: 45
End: 2025-03-31
Payer: COMMERCIAL

## 2025-03-31 NOTE — TELEPHONE ENCOUNTER
Left Voicemail (2nd Attempt) and Sent Mychart (2nd Attempt) for the patient to call back and schedule the following:    Appointment Type: Return ENT  Provider: Aiyana Minor PA-C - [facility]   Appt date:  R/S 12/22/25 TO ANY OPEN IN 12/2025  Specialty phone number: [phone]   Additional appointment(s) needed:  AUDIO PRIOR  Additional Notes:

## 2025-06-17 ENCOUNTER — RESULTS FOLLOW-UP (OUTPATIENT)
Dept: PHYSICAL MEDICINE AND REHAB | Facility: CLINIC | Age: 45
End: 2025-06-17

## 2025-06-17 ENCOUNTER — ANCILLARY PROCEDURE (OUTPATIENT)
Dept: MRI IMAGING | Facility: CLINIC | Age: 45
End: 2025-06-17
Attending: NURSE PRACTITIONER
Payer: COMMERCIAL

## 2025-06-17 DIAGNOSIS — M50.30 DDD (DEGENERATIVE DISC DISEASE), CERVICAL: ICD-10-CM

## 2025-06-17 DIAGNOSIS — Z98.1 HISTORY OF FUSION OF CERVICAL SPINE: ICD-10-CM

## 2025-06-17 DIAGNOSIS — M54.12 CERVICAL RADICULOPATHY: ICD-10-CM

## 2025-06-17 PROCEDURE — 72141 MRI NECK SPINE W/O DYE: CPT | Performed by: RADIOLOGY

## 2025-08-07 ENCOUNTER — PATIENT OUTREACH (OUTPATIENT)
Dept: CARE COORDINATION | Facility: CLINIC | Age: 45
End: 2025-08-07
Payer: COMMERCIAL

## 2025-08-21 ENCOUNTER — PATIENT OUTREACH (OUTPATIENT)
Dept: CARE COORDINATION | Facility: CLINIC | Age: 45
End: 2025-08-21
Payer: COMMERCIAL

## 2025-08-27 ENCOUNTER — TELEPHONE (OUTPATIENT)
Dept: OTOLARYNGOLOGY | Facility: CLINIC | Age: 45
End: 2025-08-27
Payer: COMMERCIAL

## (undated) DEVICE — TUBING SUCTION MEDI-VAC 1/4"X20' N620A

## (undated) DEVICE — DRSG COTTON BALL 6PK LCB62

## (undated) DEVICE — TUBE EAR GOODE T SIL 10-16010

## (undated) DEVICE — LINEN TOWEL PACK X5 5464

## (undated) DEVICE — GLOVE BIOGEL PI MICRO SZ 6.5 48565

## (undated) DEVICE — BLADE KNIFE BEAVER MYRINGOTOMY 377100

## (undated) RX ORDER — FENTANYL CITRATE 50 UG/ML
INJECTION, SOLUTION INTRAMUSCULAR; INTRAVENOUS
Status: DISPENSED
Start: 2024-07-12

## (undated) RX ORDER — FENTANYL CITRATE 50 UG/ML
INJECTION, SOLUTION INTRAMUSCULAR; INTRAVENOUS
Status: DISPENSED
Start: 2024-12-11

## (undated) RX ORDER — LIDOCAINE HYDROCHLORIDE AND EPINEPHRINE 10; 10 MG/ML; UG/ML
INJECTION, SOLUTION INFILTRATION; PERINEURAL
Status: DISPENSED
Start: 2024-07-12

## (undated) RX ORDER — OFLOXACIN 3 MG/ML
SOLUTION/ DROPS OPHTHALMIC
Status: DISPENSED
Start: 2024-07-12

## (undated) RX ORDER — ACETAMINOPHEN 325 MG/1
TABLET ORAL
Status: DISPENSED
Start: 2024-07-12

## (undated) RX ORDER — GABAPENTIN 300 MG/1
CAPSULE ORAL
Status: DISPENSED
Start: 2024-07-12

## (undated) RX ORDER — ONDANSETRON 2 MG/ML
INJECTION INTRAMUSCULAR; INTRAVENOUS
Status: DISPENSED
Start: 2024-12-11

## (undated) RX ORDER — GLYCOPYRROLATE 0.2 MG/ML
INJECTION INTRAMUSCULAR; INTRAVENOUS
Status: DISPENSED
Start: 2024-07-12